# Patient Record
Sex: FEMALE | Race: WHITE | Employment: OTHER | RURAL
[De-identification: names, ages, dates, MRNs, and addresses within clinical notes are randomized per-mention and may not be internally consistent; named-entity substitution may affect disease eponyms.]

---

## 2017-04-04 ENCOUNTER — OFFICE VISIT (OUTPATIENT)
Dept: FAMILY MEDICINE CLINIC | Age: 82
End: 2017-04-04

## 2017-04-04 DIAGNOSIS — R26.89 BALANCE PROBLEM: ICD-10-CM

## 2017-04-04 DIAGNOSIS — D64.89 ANEMIA DUE TO OTHER CAUSE, NOT CLASSIFIED: ICD-10-CM

## 2017-04-04 DIAGNOSIS — I10 ESSENTIAL HYPERTENSION WITH GOAL BLOOD PRESSURE LESS THAN 140/90: ICD-10-CM

## 2017-04-04 DIAGNOSIS — Z00.00 MEDICARE ANNUAL WELLNESS VISIT, SUBSEQUENT: Primary | ICD-10-CM

## 2017-04-04 PROBLEM — D64.9 ABSOLUTE ANEMIA: Status: ACTIVE | Noted: 2017-04-04

## 2017-04-04 RX ORDER — LISINOPRIL 10 MG/1
10 TABLET ORAL DAILY
Qty: 30 TAB | Refills: 0 | Status: SHIPPED | OUTPATIENT
Start: 2017-04-04 | End: 2017-04-07 | Stop reason: SDUPTHER

## 2017-04-04 RX ORDER — LISINOPRIL 10 MG/1
20 TABLET ORAL DAILY
Qty: 180 TAB | Refills: 3 | Status: SHIPPED | OUTPATIENT
Start: 2017-04-04 | End: 2018-01-16 | Stop reason: SDUPTHER

## 2017-04-04 NOTE — PROGRESS NOTES
Identified pt with two pt identifiers(name and ).     Chief Complaint   Patient presents with    Annual Wellness Visit    Labs     pt's last labs were done 2016 - over the past 3 months she has not been taking the multigen as she would like to see if it is still necessary when she goes for a check up    Other     pt needs referral to hematolgist - [address] [place] [address]    Medication Refill     would like paper prescription in addition to sending one refill to the local pharmacy as she would like to start mail order    Other     reports that she \"chickened out\" of getting eye lid lift and did not see derm or PT a was recommended - would like referral to go to PT for balance therapy       Health Maintenance Due   Topic    DTaP/Tdap/Td series (1 - Tdap)    GLAUCOMA SCREENING Q2Y     OSTEOPOROSIS SCREENING (DEXA)     MEDICARE YEARLY EXAM     Pneumococcal 65+ Low/Medium Risk (2 of 2 - PCV13)    INFLUENZA AGE 9 TO ADULT        Wt Readings from Last 3 Encounters:   17 156 lb (70.8 kg)   16 158 lb 3.2 oz (71.8 kg)   16 155 lb 9.6 oz (70.6 kg)     Temp Readings from Last 3 Encounters:   16 97.1 °F (36.2 °C) (Oral)   16 97.9 °F (36.6 °C) (Oral)   16 97.8 °F (36.6 °C) (Oral)     BP Readings from Last 3 Encounters:   16 138/60   16 179/83   16 140/62     Pulse Readings from Last 3 Encounters:   16 73   16 100   16 84         Learning Assessment:  :     Learning Assessment 2016   PRIMARY LEARNER Patient Patient   HIGHEST LEVEL OF EDUCATION - PRIMARY LEARNER  - GRADUATED HIGH SCHOOL OR GED   BARRIERS PRIMARY LEARNER - NONE   CO-LEARNER CAREGIVER - No   PRIMARY LANGUAGE ENGLISH ENGLISH   LEARNER PREFERENCE PRIMARY DEMONSTRATION DEMONSTRATION     - LISTENING     - READING   ANSWERED BY patient  patient   RELATIONSHIP SELF SELF       Depression Screening:  :     PHQ 2 / 9, over the last two weeks 2017   Little interest or pleasure in doing things Not at all   Feeling down, depressed or hopeless Not at all   Total Score PHQ 2 0       Fall Risk Assessment:  :     Fall Risk Assessment, last 12 mths 4/4/2017   Able to walk? Yes   Fall in past 12 months? No       Abuse Screening:  :     Abuse Screening Questionnaire 4/4/2017   Do you ever feel afraid of your partner? N   Are you in a relationship with someone who physically or mentally threatens you? N   Is it safe for you to go home? Y       Coordination of Care Questionnaire:  :     1) Have you been to an emergency room, urgent care clinic since your last visit? no   Hospitalized since your last visit? no             2) Have you seen or consulted any other health care providers outside of [address] [place] [address] since your last visit? no  (Include any pap smears or colon screenings in this section.)    3) Do you have an Advance Directive on file? no  Are you interested in receiving information about Advance Directives? no and pt declines receiving paperwork today    Patient is accompanied by self I have received verbal consent from Ary Corrales to discuss any/all medical information while they are present in the room. Reviewed record in preparation for visit and have obtained necessary documentation. Medication reconciliation up to date and corrected with patient at this time. Functional Ability:   Does the patient exhibit a steady gait? yes   How long did it take the patient to get up and walk from a sitting position? A few seconds   Is the patient self reliant?  (ie can do own laundry, meals, household chores)  yes     Does the patient handle his/her own medications? yes     Does the patient handle his/her own money? yes     Is the patients home safe (ie good lighting, handrails on stairs and bath, etc.)? No, she does not have handrails in shower     Did you notice or did patient express any hearing difficulties?    no     Did you notice or did patient express any vision difficulties? Yes, and she wears glasses to correct this     Were distance and reading eye charts used?  no       Patient was offered the opportunity to discuss advance care planning:  yes     Does patient have an Advance Directive:  no   If no, did you provide information on Caring Connections?   No, pt declines receiving paperwork

## 2017-04-04 NOTE — PROGRESS NOTES
Alison Prasad is a 80 y.o. female and presents for annual Medicare Wellness Visit. Problem List: Reviewed with patient and discussed risk factors. Patient Active Problem List   Diagnosis Code    Essential hypertension with goal blood pressure less than 140/90 I10    Absolute anemia D64.9       Current medical providers:  Patient Care Team:  Skylar Farley MD as PCP - General (Internal Medicine)    PSH: Reviewed with patient  Past Surgical History:   Procedure Laterality Date    HX CATARACT REMOVAL  1997     [facility]    [address]    osteochroditis    [address]    blood patch for intercranial spontaneious hypotension         SH: Reviewed with patient  Social History   Substance Use Topics    Smoking status: Former Smoker    Smokeless tobacco: Never Used    Alcohol use No       FH: Reviewed with patient  Family History   Problem Relation Age of Onset    Lung Disease Brother      lung cancer    Diabetes Mother     Thyroid Disease Mother      goiter    Diabetes Sister     Heart Disease Sister     Heart Disease Father     Stroke Father     Hypertension Brother     Stroke Brother     Parkinson's Disease Sister     Other Sister      brain aneurysm    Alzheimer Sister        Medications/Allergies: Reviewed with patient  No current outpatient prescriptions on file prior to visit. No current facility-administered medications on file prior to visit. Allergies   Allergen Reactions    Hyzaar [Losartan-Hydrochlorothiazide] Vertigo    Pseudoephedrine Other (comments)     Rapid heart rate.         Objective:  /90 (BP 1 Location: Right arm, BP Patient Position: Sitting)  Pulse 81  Temp 97.1 °F (36.2 °C) (Temporal)   Resp 16  Ht 5' 6\" (1.676 m)  Wt 156 lb (70.8 kg)  SpO2 98%  BMI 25.18 kg/m2  Assessment of cognitive impairment: Alert and oriented x 3    General Appearance:  Well developed, well nourished,alert and oriented x 3, and individual in no acute distress. Ears/Nose/Mouth/Throat:   Hearing grossly normal.         Neck: Supple. Chest:   Lungs clear to auscultation bilaterally. Cardiovascular:  Regular rate and rhythm, S1, S2 normal, no murmur. Abdomen:   Soft, non-tender, bowel sounds are active. Extremities: No edema bilaterally. Skin: Warm and dry. Depression Screen:   PHQ 2 / 9, over the last two weeks 4/4/2017   Little interest or pleasure in doing things Not at all   Feeling down, depressed or hopeless Not at all   Total Score PHQ 2 0       Fall Risk Assessment:    Fall Risk Assessment, last 12 mths 4/4/2017   Able to walk? Yes   Fall in past 12 months? No       Functional Ability:   Does the patient exhibit a steady gait? yes   How long did it take the patient to get up and walk from a sitting position? A few seconds   Is the patient self reliant? (ie can do own laundry, meals, household chores)  yes   Does the patient handle his/her own medications? yes   Does the patient handle his/her own money? yes   Is the patients home safe (ie good lighting, handrails on stairs and bath, etc.)? No, she does not have handrails in shower   Did you notice or did patient express any hearing difficulties? no   Did you notice or did patient express any vision difficulties? Yes, and she wears glasses to correct this   Were distance and reading eye charts used? no      Patient was offered the opportunity to discuss advance care planning:  yes    Does patient have an Advance Directive:  no   If no, did you provide information on Caring Connections?   No, pt declines receiving paperwork            Plan:      Orders Placed This Encounter    REFERRAL TO ONCOLOGY    DISCONTD: Iron [id number] (MULTIGEN FOLIC) tab capsule    lisinopril (PRINIVIL, ZESTRIL) 10 mg tablet    Iron [id number] (MULTIGEN FOLIC) tab capsule    diph,pertuss,acel,,tetanus vac,PF, (ADACEL) 2 Lf-(2.5-5-3-5 mcg)-5Lf/0.5 mL syrg vaccine    pneumococcal 13 gladis conj dip (PREVNAR-13) 0.5 mL syrg injection    lisinopril (PRINIVIL, ZESTRIL) 10 mg tablet       Health Maintenance   Topic Date Due    DTaP/Tdap/Td series (1 - Tdap) Addressed    GLAUCOMA SCREENING Q2Y  Addressed    OSTEOPOROSIS SCREENING (DEXA)  Addressed    MEDICARE YEARLY EXAM  Addressed    Pneumococcal 65+ Low/Medium Risk (2 of 2 - PCV13) Addressed    ZOSTER VACCINE AGE 60>  Completed    INFLUENZA AGE 9 TO ADULT  Addressed         ASSESSMENT and PLAN:    ICD-10-CM ICD-9-CM    1. Medicare annual wellness visit, subsequent Z00.00 V70.0 Anticipatory guidance discussed. Immunizations reviewed  HM updated. 2. Essential hypertension with goal blood pressure less than 140/90 I10 401.9 lisinopril (PRINIVIL, ZESTRIL) 10 mg tablet   3. Anemia due to other cause, not classified D64.89 285.8 REFERRAL TO ONCOLOGY   4. Balance problem R26.89 781.99 REFERRAL TO PHYSICAL THERAPY     I have discussed the diagnosis with the patient and the intended plan as seen in the above orders. The patient has received an after-visit summary and questions were answered concerning future plans. Medication Side Effects and Warnings were discussed with patient: yes   Patient Labs were reviewed and or requested: yes   Patient Past Records were reviewed and or requested: N/A    *Patient verbalized understanding and agreement with the plan. A copy of the After Visit Summary with personalized health plan was given to the patient today. Follow-up Disposition:  Return in about 1 year (around 4/4/2018), or if symptoms worsen or fail to improve.

## 2017-04-04 NOTE — MR AVS SNAPSHOT
Visit Information Date & Time Provider Department Dept. Phone Encounter #  
 4/4/2017  2:30 PM Diana OneilTyler [phone] Upcoming Health Maintenance Date Due DTaP/Tdap/Td series (1 - Tdap) 3/30/1952 GLAUCOMA SCREENING Q2Y 3/30/1996 OSTEOPOROSIS SCREENING (DEXA) 3/30/1996 MEDICARE YEARLY EXAM 3/30/1996 Pneumococcal 65+ Low/Medium Risk (2 of 2 - PCV13) 9/1/2015 Allergies as of 4/4/2017  Review Complete On: 4/4/2017 By: Jessica Leon LPN Severity Noted Reaction Type Reactions Hyzaar [Losartan-hydrochlorothiazide] High 05/06/2016    Vertigo Pseudoephedrine High 03/14/2012   Systemic Other (comments) Rapid heart rate. Current Immunizations  Reviewed on 4/4/2017 Name Date Influenza High Dose Vaccine PF 9/15/2015 Pneumococcal Polysaccharide (PPSV-23) 9/1/2014 Reviewed by Diana Oneil MD on 4/4/2017 at  3:25 PM  
You Were Diagnosed With   
  
 Codes Comments Medicare annual wellness visit, subsequent    -  Primary ICD-10-CM: Z00.00 ICD-9-CM: V70.0 Essential hypertension with goal blood pressure less than 140/90     ICD-10-CM: I10 
ICD-9-CM: 401.9 Anemia due to other cause, not classified     ICD-10-CM: D64.89 ICD-9-CM: 285.8 Balance problem     ICD-10-CM: R26.89 
ICD-9-CM: 781.99 Vitals BP Pulse Temp Resp Height(growth percentile) 189/77 (BP 1 Location: Right arm, BP Patient Position: Sitting) 81 97.1 °F (36.2 °C) (Temporal) 16 5' 6\" (1.676 m) Weight(growth percentile) SpO2 BMI OB Status Smoking Status 156 lb (70.8 kg) 98% 25.18 kg/m2 Postmenopausal Former Smoker Vitals History BMI and BSA Data Body Mass Index Body Surface Area  
 25.18 kg/m 2 1.82 m 2 Preferred Pharmacy Pharmacy Name Phone [facility] [address] [place] [address] [phone] Your Updated Medication List  
  
   
 This list is accurate as of: 17  3:37 PM.  Always use your most recent med list.  
  
  
  
  
 diph,pertuss(acel),tetanus vac(PF) 2 Lf-(2.5-5-3-5 mcg)-5Lf/0.5 mL Syrg vaccine Commonly known as:  ADACEL  
0.5 mL by IntraMUSCular route once for 1 dose. Iron AspGly-VitC-B12-FA-[id number]-Suc Tab capsule Commonly known as:  Kristin Leyva Take 1 Tab by mouth daily for 90 days. * lisinopril 10 mg tablet Commonly known as:  Sada Gravel Take 2 Tabs by mouth daily for 180 days. * lisinopril 10 mg tablet Commonly known as:  Sada Gravel Take 1 Tab by mouth daily for 30 days. pneumococcal 13 gladis conj dip 0.5 mL Syrg injection Commonly known as:  PREVNAR-13  
0.5 mL by IntraMUSCular route once for 1 dose. * Notice: This list has 2 medication(s) that are the same as other medications prescribed for you. Read the directions carefully, and ask your doctor or other care provider to review them with you. Prescriptions Printed Refills  
 lisinopril (PRINIVIL, ZESTRIL) 10 mg tablet 3 Sig: Take 2 Tabs by mouth daily for 180 days. Class: Print Route: Oral  
 Iron [id number] (MULTIGEN FOLIC) tab capsule 3 Sig: Take 1 Tab by mouth daily for 90 days. Class: Print Route: Oral  
 diph,pertuss,acel,,tetanus vac,PF, (ADACEL) 2 Lf-(2.5-5-3-5 mcg)-5Lf/0.5 mL syrg vaccine 0 Si.5 mL by IntraMUSCular route once for 1 dose. Class: Print Route: IntraMUSCular  
 pneumococcal 13 gladis conj dip (PREVNAR-13) 0.5 mL syrg injection 0 Si.5 mL by IntraMUSCular route once for 1 dose. Class: Print Route: IntraMUSCular Prescriptions Sent to Pharmacy Refills  
 lisinopril (PRINIVIL, ZESTRIL) 10 mg tablet 0 Sig: Take 1 Tab by mouth daily for 30 days. Class: Normal  
 Pharmacy: [id number] [place] [address] #: [phone] Route: Oral  
  
We Performed the Following REFERRAL TO ONCOLOGY [QCM20 Custom] Comments:  
 Patient with persistent anemia. Has been on Multigen-Folic. She had been seen previously by Dr. Rafa Hamilton. Needs new referral for a different provider. REFERRAL TO PHYSICAL THERAPY [HEO39 Custom] Comments:  
 Please evaluate patient for balance problems and for PT to help with reconditioning. Please schedule and authorize patient for services as per protocol. Referral Information Referral ID Referred By Referred To  
  
 [id number] Rachel Christensen E Not Available Visits Status Start Date End Date 1 New Request 4/4/17 4/4/18 If your referral has a status of pending review or denied, additional information will be sent to support the outcome of this decision. Referral ID Referred By Referred To  
 [id number] Rachel Christensen E Not Available Visits Status Start Date End Date 1 New Request 4/4/17 4/4/18 If your referral has a status of pending review or denied, additional information will be sent to support the outcome of this decision. Patient Instructions Well Visit, Over 72: Care Instructions Your Care Instructions Physical exams can help you stay healthy. Your doctor has checked your overall health and may have suggested ways to take good care of yourself. He or she also may have recommended tests. At home, you can help prevent illness with healthy eating, regular exercise, and other steps. Follow-up care is a key part of your treatment and safety. Be sure to make and go to all appointments, and call your doctor if you are having problems. It's also a good idea to know your test results and keep a list of the medicines you take. How can you care for yourself at home? · Reach and stay at a healthy weight. This will lower your risk for many problems, such as obesity, diabetes, heart disease, and high blood pressure. · Get at least 30 minutes of exercise on most days of the week.  Walking is a good choice. You also may want to do other activities, such as running, swimming, cycling, or playing tennis or team sports. · Do not smoke. Smoking can make health problems worse. If you need help quitting, talk to your doctor about stop-smoking programs and medicines. These can increase your chances of quitting for good. · Protect your skin from too much sun. When you're outdoors from 10 a.m. to 4 p.m., stay in the shade or cover up with clothing and a hat with a wide brim. Wear sunglasses that block UV rays. Even when it's cloudy, put broad-spectrum sunscreen (SPF 30 or higher) on any exposed skin. · See a dentist one or two times a year for checkups and to have your teeth cleaned. · Wear a seat belt in the car. · Limit alcohol to 2 drinks a day for men and 1 drink a day for women. Too much alcohol can cause health problems. Follow your doctor's advice about when to have certain tests. These tests can spot problems early. For men and women · Cholesterol. Your doctor will tell you how often to have this done based on your overall health and other things that can increase your risk for heart attack and stroke. · Blood pressure. Have your blood pressure checked during a routine doctor visit. Your doctor will tell you how often to check your blood pressure based on your age, your blood pressure results, and other factors. · Diabetes. Ask your doctor whether you should have tests for diabetes. · Vision. Experts recommend that you have yearly exams for glaucoma and other age-related eye problems. · Hearing. Tell your doctor if you notice any change in your hearing. You can have tests to find out how well you hear. · Colon cancer tests. Keep having colon cancer tests as your doctor recommends. You can have one of several types of tests. · Heart attack and stroke risk. At least every 4 to 6 years, you should have your risk for heart attack and stroke assessed.  Your doctor uses factors such as your age, blood pressure, cholesterol, and whether you smoke or have diabetes to show what your risk for a heart attack or stroke is over the next 10 years. · Osteoporosis. Talk to your doctor about whether you should have a bone density test to find out whether you have thinning bones. Also ask your doctor about whether you should take calcium and vitamin D supplements. For women · Pap test and pelvic exam. You may no longer need a Pap test. Talk with your doctor about whether to stop or continue to have Pap tests. · Breast exam and mammogram. Ask how often you should have a mammogram, which is an X-ray of your breasts. A mammogram can spot breast cancer before it can be felt and when it is easiest to treat. · Thyroid disease. Talk to your doctor about whether to have your thyroid checked as part of a regular physical exam. Women have an increased chance of a thyroid problem. For men · Prostate exam. Talk to your doctor about whether you should have a blood test (called a PSA test) for prostate cancer. Experts disagree on whether men should have this test. Some experts recommend that you discuss the benefits and risks of the test with your doctor. · Abdominal aortic aneurysm. Ask your doctor whether you should have a test to check for an aneurysm. You may need a test if you ever smoked or if your parent, brother, sister, or child has had an aneurysm. When should you call for help? Watch closely for changes in your health, and be sure to contact your doctor if you have any problems or symptoms that concern you. Where can you learn more? Go to http://katrin-connie.info/. Enter I476 in the search box to learn more about \"Well Visit, Over 65: Care Instructions. \" Current as of: July 19, 2016 Content Version: 11.2 © 2003-6400 [employer].  Care instructions adapted under license by [employer] (which disclaims liability or warranty for this information). If you have questions about a medical condition or this instruction, always ask your healthcare professional. [address] any warranty or liability for your use of this information. Introducing [facility] SERVICES! Mirian Moore introduces [employer] patient portal. Now you can access parts of your medical record, email your doctor's office, and request medication refills online. 1. In your internet browser, go to [email or web address] 2. Click on the First Time User? Click Here link in the Sign In box. You will see the New Member Sign Up page. 3. Enter your [employer] Access Code exactly as it appears below. You will not need to use this code after youve completed the sign-up process. If you do not sign up before the expiration date, you must request a new code. · [employer] Access Code: [email or web address] Expires: 7/3/2017  3:32 PM 
 
4. Enter the last four digits of your Social Security Number (xxxx) and Date of Birth (mm/dd/yyyy) as indicated and click Submit. You will be taken to the next sign-up page. 5. Create a ScreachTV ID. This will be your [employer] login ID and cannot be changed, so think of one that is secure and easy to remember. 6. Create a [employer] password. You can change your password at any time. 7. Enter your Password Reset Question and Answer. This can be used at a later time if you forget your password. 8. Enter your e-mail address. You will receive e-mail notification when new information is available in [address] 9. Click Sign Up. You can now view and download portions of your medical record. 10. Click the Download Summary menu link to download a portable copy of your medical information. If you have questions, please visit the Frequently Asked Questions section of the [employer] website. Remember, [employer] is NOT to be used for urgent needs. For medical emergencies, dial 911. Now available from your iPhone and Android! Please provide this summary of care documentation to your next provider. Your primary care clinician is listed as [address]. If you have any questions after today's visit, please call [phone].

## 2017-04-04 NOTE — PATIENT INSTRUCTIONS

## 2017-04-07 DIAGNOSIS — I10 ESSENTIAL HYPERTENSION WITH GOAL BLOOD PRESSURE LESS THAN 140/90: ICD-10-CM

## 2017-04-07 NOTE — TELEPHONE ENCOUNTER
Patient needs a reprint of script for lisonpril that is taken 1 time a day. Please mail it to the patient. The one that states take 2 times a day is not right.

## 2017-04-07 NOTE — TELEPHONE ENCOUNTER
Pt indicated she is currently on the 10mg once daily. She needs this in hardcopy please for mail order.

## 2017-04-11 RX ORDER — LISINOPRIL 10 MG/1
10 TABLET ORAL DAILY
Qty: 90 TAB | Refills: 2 | Status: SHIPPED | OUTPATIENT
Start: 2017-04-11 | End: 2018-01-16 | Stop reason: SDUPTHER

## 2017-04-16 VITALS
BODY MASS INDEX: 25.07 KG/M2 | HEIGHT: 66 IN | TEMPERATURE: 97.1 F | WEIGHT: 156 LBS | HEART RATE: 81 BPM | RESPIRATION RATE: 16 BRPM | SYSTOLIC BLOOD PRESSURE: 147 MMHG | DIASTOLIC BLOOD PRESSURE: 90 MMHG | OXYGEN SATURATION: 98 %

## 2017-05-10 ENCOUNTER — TELEPHONE (OUTPATIENT)
Dept: FAMILY MEDICINE CLINIC | Age: 82
End: 2017-05-10

## 2017-05-10 NOTE — TELEPHONE ENCOUNTER
[facility] called to asked Dr. Myrick Speak to give Dr. Soledad Coates a call regarding the pt. He will be taking over the care due to the previous MD she had retiring.  can be reached at [phone]    **They did not specify if this was urgent. **

## 2017-09-25 RX ORDER — IRON ASPGLY/C/B12/FA/CA-TH/SUC 70-150-1MG
TABLET ORAL
Qty: 90 TAB | Refills: 1 | Status: SHIPPED | OUTPATIENT
Start: 2017-09-25 | End: 2018-07-10 | Stop reason: SDUPTHER

## 2018-01-16 ENCOUNTER — OFFICE VISIT (OUTPATIENT)
Dept: FAMILY MEDICINE CLINIC | Age: 83
End: 2018-01-16

## 2018-01-16 ENCOUNTER — HOSPITAL ENCOUNTER (OUTPATIENT)
Dept: LAB | Age: 83
Discharge: HOME | End: 2018-01-16
Payer: MEDICARE

## 2018-01-16 VITALS
WEIGHT: 161 LBS | TEMPERATURE: 98.1 F | HEART RATE: 103 BPM | BODY MASS INDEX: 25.88 KG/M2 | OXYGEN SATURATION: 96 % | DIASTOLIC BLOOD PRESSURE: 84 MMHG | RESPIRATION RATE: 20 BRPM | HEIGHT: 66 IN | SYSTOLIC BLOOD PRESSURE: 162 MMHG

## 2018-01-16 DIAGNOSIS — E55.9 VITAMIN D DEFICIENCY: ICD-10-CM

## 2018-01-16 DIAGNOSIS — R32 URINARY INCONTINENCE, UNSPECIFIED TYPE: ICD-10-CM

## 2018-01-16 DIAGNOSIS — R53.83 MALAISE AND FATIGUE: ICD-10-CM

## 2018-01-16 DIAGNOSIS — R26.9 ABNORMALITY OF GAIT AND MOBILITY: ICD-10-CM

## 2018-01-16 DIAGNOSIS — R26.9 GAIT DISTURBANCE: Primary | ICD-10-CM

## 2018-01-16 DIAGNOSIS — H53.9 VISUAL DISTURBANCE: ICD-10-CM

## 2018-01-16 DIAGNOSIS — R53.81 MALAISE AND FATIGUE: ICD-10-CM

## 2018-01-16 DIAGNOSIS — G44.59 OTHER COMPLICATED HEADACHE SYNDROME: ICD-10-CM

## 2018-01-16 DIAGNOSIS — R26.89 BALANCE PROBLEMS: ICD-10-CM

## 2018-01-16 LAB
BILIRUB UR QL STRIP: NEGATIVE
GLUCOSE UR-MCNC: NEGATIVE MG/DL
KETONES P FAST UR STRIP-MCNC: NEGATIVE MG/DL
PH UR STRIP: 5.5 [PH] (ref 4.6–8)
PROT UR QL STRIP: NEGATIVE
SP GR UR STRIP: 1 (ref 1–1.03)
UA UROBILINOGEN AMB POC: NORMAL (ref 0.2–1)
URINALYSIS CLARITY POC: CLEAR
URINALYSIS COLOR POC: YELLOW
URINE BLOOD POC: NEGATIVE
URINE LEUKOCYTES POC: NEGATIVE
URINE NITRITES POC: NEGATIVE

## 2018-01-16 PROCEDURE — 80053 COMPREHEN METABOLIC PANEL: CPT

## 2018-01-16 PROCEDURE — 82306 VITAMIN D 25 HYDROXY: CPT

## 2018-01-16 PROCEDURE — 36415 COLL VENOUS BLD VENIPUNCTURE: CPT

## 2018-01-16 PROCEDURE — 84443 ASSAY THYROID STIM HORMONE: CPT

## 2018-01-16 PROCEDURE — 82607 VITAMIN B-12: CPT

## 2018-01-16 PROCEDURE — 85025 COMPLETE CBC W/AUTO DIFF WBC: CPT

## 2018-01-16 NOTE — PROGRESS NOTES
Identified pt with two pt identifiers(name and ). Chief Complaint   Patient presents with    Annual Wellness Visit    Head Pain     Headaches are getting worse    Neurologic Problem     abnormal gait, incontinence, leaning forward, vision, memory concentration decreasing; loss of words        Health Maintenance Due   Topic    DTaP/Tdap/Td series (1 - Tdap)    GLAUCOMA SCREENING Q2Y     OSTEOPOROSIS SCREENING (DEXA)     Pneumococcal 65+ Low/Medium Risk (2 of 2 - PCV13)       Wt Readings from Last 3 Encounters:   18 161 lb (73 kg)   17 156 lb (70.8 kg)   16 158 lb 3.2 oz (71.8 kg)     Temp Readings from Last 3 Encounters:   18 98.1 °F (36.7 °C) (Temporal)   17 97.1 °F (36.2 °C) (Temporal)   16 97.1 °F (36.2 °C) (Oral)     BP Readings from Last 3 Encounters:   18 144/62   17 147/90   16 138/60     Pulse Readings from Last 3 Encounters:   18 (!) 103   17 81   16 73         Learning Assessment:  :     Learning Assessment 2016   PRIMARY LEARNER Patient Patient   HIGHEST LEVEL OF EDUCATION - PRIMARY LEARNER  - GRADUATED HIGH SCHOOL OR GED   BARRIERS PRIMARY LEARNER - NONE   CO-LEARNER CAREGIVER - No   PRIMARY LANGUAGE ENGLISH ENGLISH   LEARNER PREFERENCE PRIMARY DEMONSTRATION DEMONSTRATION     - LISTENING     - READING   ANSWERED BY patient  patient   RELATIONSHIP SELF SELF       Depression Screening:  :     PHQ over the last two weeks 2018   Little interest or pleasure in doing things Not at all   Feeling down, depressed or hopeless Not at all   Total Score PHQ 2 0       Fall Risk Assessment:  :     Fall Risk Assessment, last 12 mths 2018   Able to walk? Yes   Fall in past 12 months? Yes   Number of falls in past 12 months 2       Abuse Screening:  :     Abuse Screening Questionnaire 2017   Do you ever feel afraid of your partner?  N   Are you in a relationship with someone who physically or mentally threatens you? N   Is it safe for you to go home? Y       Coordination of Care Questionnaire:  :     1) Have you been to an emergency room, urgent care clinic since your last visit? no   Hospitalized since your last visit? no             2) Have you seen or consulted any other health care providers outside of [address] [place] [address] since your last visit? no  (Include any pap smears or colon screenings in this section.)    3) Do you have an Advance Directive on file? no  Are you interested in receiving information about Advance Directives? no    Patient is accompanied by daughter I have received verbal consent from Azul Chowdhury to discuss any/all medical information while they are present in the room. Reviewed record in preparation for visit and have obtained necessary documentation. Medication reconciliation up to date and corrected with patient at this time.

## 2018-01-16 NOTE — MR AVS SNAPSHOT
[address] [place] [address] [address] [facility] 
[phone] Patient: Ottoniel Cadena MRN: [id number] ZMO:[id number] Visit Information Date & Time Provider Department Dept. Phone Encounter #  
 1/16/2018  2:45 PM Tyler Duran [id number] Upcoming Health Maintenance Date Due DTaP/Tdap/Td series (1 - Tdap) 3/30/1952 GLAUCOMA SCREENING Q2Y 3/30/1996 OSTEOPOROSIS SCREENING (DEXA) 3/30/1996 Pneumococcal 65+ Low/Medium Risk (2 of 2 - PCV13) 9/1/2015 MEDICARE YEARLY EXAM 4/5/2018 Allergies as of 1/16/2018  Review Complete On: 1/16/2018 By: Javier Ruvalcaba MD  
  
 Severity Noted Reaction Type Reactions Hyzaar [Losartan-hydrochlorothiazide] High 05/06/2016    Vertigo Pseudoephedrine High 03/14/2012   Systemic Other (comments) Rapid heart rate. Current Immunizations  Reviewed on 4/4/2017 Name Date Influenza High Dose Vaccine PF 11/14/2017, 9/15/2015 Pneumococcal Polysaccharide (PPSV-23) 9/1/2014 Not reviewed this visit You Were Diagnosed With   
  
 Codes Comments Gait disturbance    -  Primary ICD-10-CM: R26.9 ICD-9-CM: 997. 2 Balance problems     ICD-10-CM: R26.89 
ICD-9-CM: 781.99 Visual disturbance     ICD-10-CM: H53.9 ICD-9-CM: 368.9 Urinary incontinence, unspecified type     ICD-10-CM: R32 
ICD-9-CM: 788.30 Other complicated headache syndrome     ICD-10-CM: G44.59 
ICD-9-CM: 339.44 Vitamin D deficiency     ICD-10-CM: E55.9 ICD-9-CM: 268.9 Malaise and fatigue     ICD-10-CM: R53.81, R53.83 ICD-9-CM: 780.79 Abnormality of gait and mobility     ICD-10-CM: R26.9 ICD-9-CM: 066. 2 Vitals BP Pulse Temp Resp Height(growth percentile) Weight(growth percentile) 144/62 (!) 103 98.1 °F (36.7 °C) (Temporal) 20 5' 6\" (1.676 m) 161 lb (73 kg) SpO2 BMI OB Status Smoking Status 96% 25.99 kg/m2 Postmenopausal Former Smoker BMI and BSA Data Body Mass Index Body Surface Area  
 25.99 kg/m 2 1.84 m 2 Preferred Pharmacy Pharmacy Name Phone Jonnie Riz[facility] [phone] Your Updated Medication List  
  
   
This list is accurate as of: 1/16/18  4:19 PM.  Always use your most recent med list.  
  
  
  
  
 lisinopril 10 mg tablet Commonly known as:  PRINIVIL, ZESTRIL  
TAKE ONE TABLET BY MOUTH ONCE DAILY MULTIGEN FOLIC Tab capsule Generic drug:  Iron AspGly-VitC-B12-FA-Ca-Suc  
TAKE ONE TABLET BY MOUTH ONCE DAILY FOR  90  DAYS We Performed the Following AMB POC URINALYSIS DIP STICK AUTO W/O MICRO [96939 CPT(R)] CBC WITH AUTOMATED DIFF [27636 CPT(R)] METABOLIC PANEL, COMPREHENSIVE [84505 CPT(R)] REFERRAL TO NEUROLOGY [DFU39 Custom] Comments:  
 86F who has been seen by Dr. Solange Morales in the past. She has had worsening daily headaches, balance issues, lower blood pressures (for her), gait disturbance, incontinence. The concern is Normal Pressure Hydrocephalus vs other pathology. Her symptoms seem to have progressed over the past several months and she is becoming more and more concerned about falling (and has had some falls). THYROID CASCADE PROFILE [[id number] Custom] VITAMIN B12 & FOLATE [23797 CPT(R)] VITAMIN D, 25 HYDROXY [id number] CPT(R)] Referral Information Referral ID Referred By Referred To  
  
 [id number] Chantell Devries MD   
   [address] Phone: [phone] Fax: [phone] Visits Status Start Date End Date 1 New Request 1/16/18 1/16/19 If your referral has a status of pending review or denied, additional information will be sent to support the outcome of this decision. Patient Instructions Headache: Care Instructions Your Care Instructions Headaches have many possible causes. Most headaches aren't a sign of a more serious problem, and they will get better on their own. Home treatment may help you feel better faster. The doctor has checked you carefully, but problems can develop later. If you notice any problems or new symptoms, get medical treatment right away. Follow-up care is a key part of your treatment and safety. Be sure to make and go to all appointments, and call your doctor if you are having problems. It's also a good idea to know your test results and keep a list of the medicines you take. How can you care for yourself at home? · Do not drive if you have taken a prescription pain medicine. · Rest in a quiet, dark room until your headache is gone. Close your eyes and try to relax or go to sleep. Don't watch TV or read. · Put a cold, moist cloth or cold pack on the painful area for 10 to 20 minutes at a time. Put a thin cloth between the cold pack and your skin. · Use a warm, moist towel or a heating pad set on low to relax tight shoulder and neck muscles. · Have someone gently massage your neck and shoulders. · Take pain medicines exactly as directed. ¨ If the doctor gave you a prescription medicine for pain, take it as prescribed. ¨ If you are not taking a prescription pain medicine, ask your doctor if you can take an over-the-counter medicine. · Be careful not to take pain medicine more often than the instructions allow, because you may get worse or more frequent headaches when the medicine wears off. · Do not ignore new symptoms that occur with a headache, such as a fever, weakness or numbness, vision changes, or confusion. These may be signs of a more serious problem. To prevent headaches · Keep a headache diary so you can figure out what triggers your headaches. Avoiding triggers may help you prevent headaches.  Record when each headache began, how long it lasted, and what the pain was like (throbbing, aching, stabbing, or dull). Write down any other symptoms you had with the headache, such as nausea, flashing lights or dark spots, or sensitivity to bright light or loud noise. Note if the headache occurred near your period. List anything that might have triggered the headache, such as certain foods (chocolate, cheese, wine) or odors, smoke, bright light, stress, or lack of sleep. · Find healthy ways to deal with stress. Headaches are most common during or right after stressful times. Take time to relax before and after you do something that has caused a headache in the past. 
· Try to keep your muscles relaxed by keeping good posture. Check your jaw, face, neck, and shoulder muscles for tension, and try relaxing them. When sitting at a desk, change positions often, and stretch for 30 seconds each hour. · Get plenty of sleep and exercise. · Eat regularly and well. Long periods without food can trigger a headache. · Treat yourself to a massage. Some people find that regular massages are very helpful in relieving tension. · Limit caffeine by not drinking too much coffee, tea, or soda. But don't quit caffeine suddenly, because that can also give you headaches. · Reduce eyestrain from computers by blinking frequently and looking away from the computer screen every so often. Make sure you have proper eyewear and that your monitor is set up properly, about an arm's length away. · Seek help if you have depression or anxiety. Your headaches may be linked to these conditions. Treatment can both prevent headaches and help with symptoms of anxiety or depression. When should you call for help? Call 911 anytime you think you may need emergency care. For example, call if: 
? · You have signs of a stroke. These may include: 
¨ Sudden numbness, paralysis, or weakness in your face, arm, or leg, especially on only one side of your body. ¨ Sudden vision changes. ¨ Sudden trouble speaking. ¨ Sudden confusion or trouble understanding simple statements. ¨ Sudden problems with walking or balance. ¨ A sudden, severe headache that is different from past headaches. ?Call your doctor now or seek immediate medical care if: 
? · You have a new or worse headache. ? · Your headache gets much worse. Where can you learn more? Go to http://katrin-connie.info/. Enter M271 in the search box to learn more about \"Headache: Care Instructions. \" Current as of: October 14, 2016 Content Version: 11.4 © 7892-4509 [employer]. Care instructions adapted under license by [employer] (which disclaims liability or warranty for this information). If you have questions about a medical condition or this instruction, always ask your healthcare professional. [address] any warranty or liability for your use of this information. Introducing [facility]! Elio Tacho introduces manetch patient portal. Now you can access parts of your medical record, email your doctor's office, and request medication refills online. 1. In your internet browser, go to [email or web address] by [employer] 2. Click on the First Time User? Click Here link in the Sign In box. You will see the New Member Sign Up page. 3. Enter your manetch Access Code exactly as it appears below. You will not need to use this code after youve completed the sign-up process. If you do not sign up before the expiration date, you must request a new code. · manetch Access Code: [facility] Expires: 4/16/2018  4:19 PM 
 
4. Enter the last four digits of your Social Security Number (xxxx) and Date of Birth (mm/dd/yyyy) as indicated and click Submit. You will be taken to the next sign-up page. 5. Create a manetch ID. This will be your manetch login ID and cannot be changed, so think of one that is secure and easy to remember. 6. Create a Medigo password. You can change your password at any time. 7. Enter your Password Reset Question and Answer. This can be used at a later time if you forget your password. 8. Enter your e-mail address. You will receive e-mail notification when new information is available in [address] 9. Click Sign Up. You can now view and download portions of your medical record. 10. Click the Download Summary menu link to download a portable copy of your medical information. If you have questions, please visit the Frequently Asked Questions section of the Medigo website. Remember, Medigo is NOT to be used for urgent needs. For medical emergencies, dial 911. Now available from your iPhone and Android! Please provide this summary of care documentation to your next provider. Your primary care clinician is listed as [address]. If you have any questions after today's visit, please call [phone].

## 2018-01-16 NOTE — PATIENT INSTRUCTIONS

## 2018-01-18 LAB
25(OH)D3+25(OH)D2 SERPL-MCNC: 15.7 NG/ML (ref 30–100)
ALBUMIN SERPL-MCNC: 4.4 G/DL (ref 3.5–4.7)
ALBUMIN/GLOB SERPL: 1.9 {RATIO} (ref 1.2–2.2)
ALP SERPL-CCNC: 64 IU/L (ref 39–117)
ALT SERPL-CCNC: 12 IU/L (ref 0–32)
AST SERPL-CCNC: 22 IU/L (ref 0–40)
BASOPHILS # BLD AUTO: 0 X10E3/UL (ref 0–0.2)
BASOPHILS NFR BLD AUTO: 1 %
BILIRUB SERPL-MCNC: <0.2 MG/DL (ref 0–1.2)
BUN SERPL-MCNC: 13 MG/DL (ref 8–27)
BUN/CREAT SERPL: 24 (ref 12–28)
CALCIUM SERPL-MCNC: 10.1 MG/DL (ref 8.7–10.3)
CHLORIDE SERPL-SCNC: 103 MMOL/L (ref 96–106)
CO2 SERPL-SCNC: 24 MMOL/L (ref 18–29)
CREAT SERPL-MCNC: 0.55 MG/DL (ref 0.57–1)
EOSINOPHIL # BLD AUTO: 0 X10E3/UL (ref 0–0.4)
EOSINOPHIL NFR BLD AUTO: 1 %
ERYTHROCYTE [DISTWIDTH] IN BLOOD BY AUTOMATED COUNT: 15.2 % (ref 12.3–15.4)
FOLATE SERPL-MCNC: >20 NG/ML
GLOBULIN SER CALC-MCNC: 2.3 G/DL (ref 1.5–4.5)
GLUCOSE SERPL-MCNC: 96 MG/DL (ref 65–99)
HCT VFR BLD AUTO: 39.8 % (ref 34–46.6)
HGB BLD-MCNC: 13.4 G/DL (ref 11.1–15.9)
IMM GRANULOCYTES # BLD: 0 X10E3/UL (ref 0–0.1)
IMM GRANULOCYTES NFR BLD: 0 %
LYMPHOCYTES # BLD AUTO: 1.7 X10E3/UL (ref 0.7–3.1)
LYMPHOCYTES NFR BLD AUTO: 40 %
MCH RBC QN AUTO: 28.8 PG (ref 26.6–33)
MCHC RBC AUTO-ENTMCNC: 33.7 G/DL (ref 31.5–35.7)
MCV RBC AUTO: 86 FL (ref 79–97)
MONOCYTES # BLD AUTO: 0.3 X10E3/UL (ref 0.1–0.9)
MONOCYTES NFR BLD AUTO: 6 %
NEUTROPHILS # BLD AUTO: 2.2 X10E3/UL (ref 1.4–7)
NEUTROPHILS NFR BLD AUTO: 52 %
PLATELET # BLD AUTO: 249 X10E3/UL (ref 150–379)
POTASSIUM SERPL-SCNC: 4.4 MMOL/L (ref 3.5–5.2)
PROT SERPL-MCNC: 6.7 G/DL (ref 6–8.5)
RBC # BLD AUTO: 4.65 X10E6/UL (ref 3.77–5.28)
SODIUM SERPL-SCNC: 142 MMOL/L (ref 134–144)
TSH SERPL DL<=0.005 MIU/L-ACNC: 2 UIU/ML (ref 0.45–4.5)
VIT B12 SERPL-MCNC: 394 PG/ML (ref 232–1245)
WBC # BLD AUTO: 4.2 X10E3/UL (ref 3.4–10.8)

## 2018-01-18 NOTE — PROGRESS NOTES
Only concern with labs was very low vitamin D levels. I recommend 1 of 2 options. 1. Vitamin D 2000 international units daily OR  2. Vitamin D 50,000 international units weekly. #1 is OTC  #2 is a script. Other labs were excellent! !!

## 2018-01-22 ENCOUNTER — OFFICE VISIT (OUTPATIENT)
Dept: NEUROLOGY | Age: 83
End: 2018-01-22

## 2018-01-22 VITALS
RESPIRATION RATE: 18 BRPM | TEMPERATURE: 98.4 F | HEIGHT: 66 IN | BODY MASS INDEX: 25.05 KG/M2 | WEIGHT: 155.9 LBS | SYSTOLIC BLOOD PRESSURE: 166 MMHG | DIASTOLIC BLOOD PRESSURE: 94 MMHG | HEART RATE: 81 BPM | OXYGEN SATURATION: 97 %

## 2018-01-22 DIAGNOSIS — R26.9 GAIT DIFFICULTY: Primary | ICD-10-CM

## 2018-01-22 DIAGNOSIS — G62.9 NEUROPATHY: ICD-10-CM

## 2018-01-22 DIAGNOSIS — R41.3 MEMORY CHANGES: ICD-10-CM

## 2018-01-22 DIAGNOSIS — R26.9 GAIT DIFFICULTY: ICD-10-CM

## 2018-01-22 NOTE — MR AVS SNAPSHOT
Romana Halo 
 
 
 [place] [address] [facility] [address] [phone] Patient: Adonis Soto MRN: [id number] NRB:[id number] Visit Information Date & Time Provider Department Dept. Phone Encounter #  
 1/22/2018 10:00 AM Sumit Sylvester MD [facility] Neurology [facility] [phone] [id number] Follow-up Instructions Return in about 2 months (around 3/22/2018). Upcoming Health Maintenance Date Due DTaP/Tdap/Td series (1 - Tdap) 3/30/1952 GLAUCOMA SCREENING Q2Y 3/30/1996 OSTEOPOROSIS SCREENING (DEXA) 3/30/1996 Pneumococcal 65+ Low/Medium Risk (2 of 2 - PCV13) 9/1/2015 MEDICARE YEARLY EXAM 4/5/2018 Allergies as of 1/22/2018  Review Complete On: 1/22/2018 By: Sumit Sylvester MD  
  
 Severity Noted Reaction Type Reactions Hyzaar [Losartan-hydrochlorothiazide] High 05/06/2016    Vertigo Pseudoephedrine High 03/14/2012   Systemic Other (comments) Rapid heart rate. Naprosyn [Naproxen]  01/22/2018    Not Reported This Time Current Immunizations  Reviewed on 4/4/2017 Name Date Influenza High Dose Vaccine PF 11/14/2017, 9/15/2015 Pneumococcal Polysaccharide (PPSV-23) 9/1/2014 Not reviewed this visit You Were Diagnosed With   
  
 Codes Comments Gait difficulty    -  Primary ICD-10-CM: R26.9 ICD-9-CM: 865. 2 Memory changes     ICD-10-CM: R41.3 ICD-9-CM: 780.93 Neuropathy     ICD-10-CM: G62.9 ICD-9-CM: 952. 9 Vitals BP Pulse Temp Resp Height(growth percentile) Weight(growth percentile) (!) 166/94 81 98.4 °F (36.9 °C) (Oral) 18 5' 6\" (1.676 m) 155 lb 14.4 oz (70.7 kg) SpO2 BMI OB Status Smoking Status 97% 25.16 kg/m2 Postmenopausal Former Smoker Vitals History BMI and BSA Data Body Mass Index Body Surface Area  
 25.16 kg/m 2 1.81 m 2 Preferred Pharmacy Pharmacy Name Phone [address] [place] [phone] Your Updated Medication List  
  
   
This list is accurate as of: 1/22/18 11:07 AM.  Always use your most recent med list.  
  
  
  
  
 BC HEADACHE POWDER PO Take  by mouth.  
  
 lisinopril 10 mg tablet Commonly known as:  PRINIVIL, ZESTRIL  
TAKE ONE TABLET BY MOUTH ONCE DAILY MULTIGEN FOLIC Tab capsule Generic drug:  Iron AspGly-VitC-B12-FA-Ca-Suc  
TAKE ONE TABLET BY MOUTH ONCE DAILY FOR  90  DAYS We Performed the Following , DIRECT, W/REFLEX I798910 CPT(R)] ANCA PANEL C6063034 CPT(R)] C REACTIVE PROTEIN, QT [81478 CPT(R)] GLUCOSE TOLERANCE (3 SP BLOOD) [id number] CPT(R)] METABOLIC PANEL, COMPREHENSIVE [03421 CPT(R)] PROTEIN ELECTROPHORESIS [87072 CPT(R)] REFERRAL TO PSYCHOLOGY [IGQ32 Custom] Comments:  
 Memory dysfunction that has involved. RHEUMATOID FACTOR, QL [id number] CPT(R)] SJOGREN'S ABS, SSA AND SSB [[id number] Custom] Follow-up Instructions Return in about 2 months (around 3/22/2018). To-Do List   
 01/22/2018 Lab:  SED RATE (ESR)   
  
 01/24/2018 Neurology:  NCV SENSORY OR MIXED   
  
 01/29/2018 Imaging:  MRI BRAIN WO CONT   
  
 01/29/2018 Imaging:  MRI LUMB SPINE WO CONT Referral Information Referral ID Referred By Referred To  
  
 [id number] Олег Chand [address] [place] [address] [address], [place] [address] Phone: [phone] Fax: [phone] Visits Status Start Date End Date 1 Authorized 1/22/18 1/22/19 If your referral has a status of pending review or denied, additional information will be sent to support the outcome of this decision. Patient Instructions Information Regarding Testing If you have physican order for a test or a medication denied by your insurance company, this does not mean the test or medication is not appropriate for you as that is a medical decision, not a decision to be made by an insurance company representative or by an [address] [place] [address] who has not interviewed and examined you. This is a decision to be made between you and your physician. The denial of services is a contractual matter between you and your insurance company, not an issue between your physician and the insurance company. If your test or medication is denied, you can take the following steps to help resolve the issue: 1. File a complaint with the [facility] of Insurance regarding your insurance company's denial of services ordered for you. You can do this either by calling them directly or by completing an on-line complaint form on the SendMe. This can be found at [email or web address] 2. Also file a formal complaint with your insurance company and ask to have the name of the person denying the service so that you may explore a legal option should you be harmed by this denial of service. Again, the fact the insurance company will not pay for the service does not mean it is not medically necessary and I would encourage you to follow through with the plan that was made with your physician 3. File a written complaint with your employer so your employer and benefit manager is aware of the poor coverage they are providing their employees. If you have medicare/medicaid, complain to your representative in the House and to your Debby Sosa. If we have ordered testing for you, we do not call patients with results and we do not give test results over the phone. We schedule follow up appointments so that your results can be discussed in person and any questions you have regarding them may be addressed. If something of concern is revealed on your test, we will call you for a sooner follow up appointment.   Additionally, results may be found by using the My Chart feature and one of our patient service representatives at the  can give you instructions on how to access this feature of our electronic medical record system. Learning About the Symptoms of Dementia What is dementia? We all forget things as we get older. Many older people have a slight loss of memory that does not affect their daily lives. But memory loss that gets worse may mean that you have dementia. Dementia is a loss of mental skills that affects your daily life. It can cause problems with memory, problem-solving, and learning. It also can cause problems with thinking and planning. Dementia usually gets worse over time. But how quickly it gets worse is different for each person. Some people stay the same for years. Others lose skills quickly. Your chances of having dementia rise as you get older. But this doesn't mean that everyone will get it. What causes dementia? Dementia is caused by damage to or changes in the brain. Alzheimer's disease is the most common kind of dementia. Alzheimer's causes a steady loss of memory and how well you can speak, think, and do your daily activities. The second most common kind of dementia is caused by a series of strokes. It's called vascular dementia. It often gets worse step by step. With each new stroke, more mental skills are lost. 
Serious head injuries in the past can sometimes lead to dementia, too. What are the symptoms? Usually the first symptom of dementia is memory loss. Often the person who has the memory problem doesn't notice it, but family and friends do. People who have dementia may have increasing trouble with: 
· Recalling recent events. They may forget appointments or lose objects. · Recognizing people and places. · Keeping up with conversations and activity. · Finding their way around familiar places, or driving to and from places they know well. · Keeping up personal care such as grooming or bathing. · Planning and carrying out routine tasks. They may have trouble following a recipe or writing a letter or email. What are some next steps? If you are worried about memory loss, see your doctor soon. He or she can do a physical exam and ask questions about recent and past illnesses and life events. Think about bringing someone to your doctor's appointment to take notes for you. Your doctor may suggest a series of tests to measure memory changes over time. These tests give the doctor an overall picture of how well your brain is working. The doctor can use the results to decide the best treatment program and help make your life safer and easier. It is important to know that memory loss can be caused by things other than dementia. These things can include health problems such as depression, a low amount of thyroid hormone, and some infections. When those things are treated, your ability to remember can get better. Follow-up care is a key part of your treatment and safety. Be sure to make and go to all appointments, and call your doctor if you are having problems. It's also a good idea to know your test results and keep a list of the medicines you take. Where can you learn more? Go to http://katrin-connie.info/. Enter [id number] [phone] in the search box to learn more about \"Learning About the Symptoms of Dementia. \" Current as of: May 12, 2017 Content Version: 11.4 © 1418-8453 [employer]wise, [employer]. Care instructions adapted under license by [employer] (which disclaims liability or warranty for this information). If you have questions about a medical condition or this instruction, always ask your healthcare professional. [place] [address] any warranty or liability for your use of this information. Kathrine Castellon 2176 What is a living will?  
 
A living will is a legal form you use to write down the kind of care you want at the end of your life. It is used by the health professionals who will treat you if you aren't able to decide for yourself. If you put your wishes in writing, your loved ones and others will know what kind of care you want. They won't need to guess. This can ease your mind and be helpful to others. A living will is not the same as an estate or property will. An estate will explains what you want to happen with your money and property after you die. Is a living will a legal document? A living will is a legal document. Each state has its own laws about living wylie. If you move to another state, make sure that your living will is legal in the state where you now live. Or you might use a universal form that has been approved by many states. This kind of form can sometimes be completed and stored online. Your electronic copy will then be available wherever you have a connection to the Internet. In most cases, doctors will respect your wishes even if you have a form from a different state. · You don't need an  to complete a living will. But legal advice can be helpful if your state's laws are unclear, your health history is complicated, or your family can't agree on what should be in your living will. · You can change your living will at any time. Some people find that their wishes about end-of-life care change as their health changes. · In addition to making a living will, think about completing a medical power of  form. This form lets you name the person you want to make end-of-life treatment decisions for you (your \"health care agent\") if you're not able to. Many hospitals and nursing homes will give you the forms you need to complete a living will and a medical power of . · Your living will is used only if you can't make or communicate decisions for yourself anymore.  If you become able to make decisions again, you can accept or refuse any treatment, no matter what you wrote in your living will. · Your state may offer an online registry. This is a place where you can store your living will online so the doctors and nurses who need to treat you can find it right away. What should you think about when creating a living will? Talk about your end-of-life wishes with your family members and your doctor. Let them know what you want. That way the people making decisions for you won't be surprised by your choices. Think about these questions as you make your living will: · Do you know enough about life support methods that might be used? If not, talk to your doctor so you know what might be done if you can't breathe on your own, your heart stops, or you're unable to swallow. · What things would you still want to be able to do after you receive life-support methods? Would you want to be able to walk? To speak? To eat on your own? To live without the help of machines? · If you have a choice, where do you want to be cared for? In your home? At a hospital or nursing home? · Do you want certain Christianity practices performed if you become very ill? · If you have a choice at the end of your life, where would you prefer to die? At home? In a hospital or nursing home? Somewhere else? · Would you prefer to be buried or cremated? · Do you want your organs to be donated after you die? What should you do with your living will? · Make sure that your family members and your health care agent have copies of your living will. · Give your doctor a copy of your living will to keep in your medical record. If you have more than one doctor, make sure that each one has a copy. · You may want to put a copy of your living will where it can be easily found. Where can you learn more? Go to http://katrin-connie.info/. Enter M170 in the search box to learn more about \"Learning About Living [place]. \" 
 Current as of: September 24, 2016 Content Version: 11.4 © 8358-6309 [employer]. Care instructions adapted under license by [employer] (which disclaims liability or warranty for this information). If you have questions about a medical condition or this instruction, always ask your healthcare professional. [address] any warranty or liability for your use of this information. Patient history reviewed and patient examined. I have several potential leads but I do not have a strong feeling on first visit as to what is leading to the falling history. Will check out memory dysfunction at this time as well which in some people could be a critical issue that enhances falling predisposition. Introducing [facility]! Joe Washington introduces Member Desk patient portal. Now you can access parts of your medical record, email your doctor's office, and request medication refills online. 1. In your internet browser, go to [email or web address] 2. Click on the First Time User? Click Here link in the Sign In box. You will see the New Member Sign Up page. 3. Enter your Member Desk Access Code exactly as it appears below. You will not need to use this code after youve completed the sign-up process. If you do not sign up before the expiration date, you must request a new code. · Member Desk Access Code: [facility] Expires: 4/16/2018  4:19 PM 
 
4. Enter the last four digits of your Social Security Number (xxxx) and Date of Birth (mm/dd/yyyy) as indicated and click Submit. You will be taken to the next sign-up page. 5. Create a [email or web address] ID. This will be your Member Desk login ID and cannot be changed, so think of one that is secure and easy to remember. 6. Create a Member Desk password. You can change your password at any time. 7. Enter your Password Reset Question and Answer. This can be used at a later time if you forget your password. 8. Enter your e-mail address. You will receive e-mail notification when new information is available in [address] 9. Click Sign Up. You can now view and download portions of your medical record. 10. Click the Download Summary menu link to download a portable copy of your medical information. If you have questions, please visit the Frequently Asked Questions section of the [employer]Runner website. Remember, ListRunner is NOT to be used for urgent needs. For medical emergencies, dial 911. Now available from your iPhone and Android! Please provide this summary of care documentation to your next provider. Your primary care clinician is listed as [address]. If you have any questions after today's visit, please call [phone].

## 2018-01-22 NOTE — PATIENT INSTRUCTIONS
Information Regarding Testing     If you have physican order for a test or a medication denied by your insurance company, this does not mean the test or medication is not appropriate for you as that is a medical decision, not a decision to be made by an insurance company representative or by an [facility] physician who has not interviewed and examined you. This is a decision to be made between you and your physician. The denial of services is a contractual matter between you and your insurance company, not an issue between your physician and the insurance company. If your test or medication is denied, you can take the following steps to help resolve the issue:    1. File a complaint with the [facility] regarding your insurance company's denial of services ordered for you. You can do this either by calling them directly or by completing an on-line complaint form on the [employer]. This can be found at [email or web address]    2. Also file a formal complaint with your insurance company and ask to have the name of the person denying the service so that you may explore a legal option should you be harmed by this denial of service. Again, the fact the insurance company will not pay for the service does not mean it is not medically necessary and I would encourage you to follow through with the plan that was made with your physician    3. File a written complaint with your employer so your employer and benefit manager is aware of the poor coverage they are providing their employees. If you have medicare/medicaid, complain to your representative in the House and to your Debby Sosa. If we have ordered testing for you, we do not call patients with results and we do not give test results over the phone. We schedule follow up appointments so that your results can be discussed in person and any questions you have regarding them may be addressed.   If something of concern is revealed on your test, we will call you for a sooner follow up appointment. Additionally, results may be found by using the My Chart feature and one of our patient service representatives at the  can give you instructions on how to access this feature of our electronic medical record system. Learning About the Symptoms of Dementia  What is dementia? We all forget things as we get older. Many older people have a slight loss of memory that does not affect their daily lives. But memory loss that gets worse may mean that you have dementia. Dementia is a loss of mental skills that affects your daily life. It can cause problems with memory, problem-solving, and learning. It also can cause problems with thinking and planning. Dementia usually gets worse over time. But how quickly it gets worse is different for each person. Some people stay the same for years. Others lose skills quickly. Your chances of having dementia rise as you get older. But this doesn't mean that everyone will get it. What causes dementia? Dementia is caused by damage to or changes in the brain. Alzheimer's disease is the most common kind of dementia. Alzheimer's causes a steady loss of memory and how well you can speak, think, and do your daily activities. The second most common kind of dementia is caused by a series of strokes. It's called vascular dementia. It often gets worse step by step. With each new stroke, more mental skills are lost.  Serious head injuries in the past can sometimes lead to dementia, too. What are the symptoms? Usually the first symptom of dementia is memory loss. Often the person who has the memory problem doesn't notice it, but family and friends do. People who have dementia may have increasing trouble with:  · Recalling recent events. They may forget appointments or lose objects. · Recognizing people and places. · Keeping up with conversations and activity.   · Finding their way around familiar places, or driving to and from places they know well. · Keeping up personal care such as grooming or bathing. · Planning and carrying out routine tasks. They may have trouble following a recipe or writing a letter or email. What are some next steps? If you are worried about memory loss, see your doctor soon. He or she can do a physical exam and ask questions about recent and past illnesses and life events. Think about bringing someone to your doctor's appointment to take notes for you. Your doctor may suggest a series of tests to measure memory changes over time. These tests give the doctor an overall picture of how well your brain is working. The doctor can use the results to decide the best treatment program and help make your life safer and easier. It is important to know that memory loss can be caused by things other than dementia. These things can include health problems such as depression, a low amount of thyroid hormone, and some infections. When those things are treated, your ability to remember can get better. Follow-up care is a key part of your treatment and safety. Be sure to make and go to all appointments, and call your doctor if you are having problems. It's also a good idea to know your test results and keep a list of the medicines you take. Where can you learn more? Go to http://katrin-connie.info/. Enter [id number] in the search box to learn more about \"Learning About the Symptoms of Dementia. \"  Current as of: May 12, 2017  Content Version: 11.4  © 6576-6256 [employer]. Care instructions adapted under license by [employer] (which disclaims liability or warranty for this information). If you have questions about a medical condition or this instruction, always ask your healthcare professional. [place] [address] any warranty or liability for your use of this information.        Learning About Living [place]  What is a living will? A living will is a legal form you use to write down the kind of care you want at the end of your life. It is used by the health professionals who will treat you if you aren't able to decide for yourself. If you put your wishes in writing, your loved ones and others will know what kind of care you want. They won't need to guess. This can ease your mind and be helpful to others. A living will is not the same as an estate or property will. An estate will explains what you want to happen with your money and property after you die. Is a living will a legal document? A living will is a legal document. Each state has its own laws about living wylie. If you move to another state, make sure that your living will is legal in the state where you now live. Or you might use a universal form that has been approved by many states. This kind of form can sometimes be completed and stored online. Your electronic copy will then be available wherever you have a connection to the Internet. In most cases, doctors will respect your wishes even if you have a form from a different state. · You don't need an  to complete a living will. But legal advice can be helpful if your state's laws are unclear, your health history is complicated, or your family can't agree on what should be in your living will. · You can change your living will at any time. Some people find that their wishes about end-of-life care change as their health changes. · In addition to making a living will, think about completing a medical power of  form. This form lets you name the person you want to make end-of-life treatment decisions for you (your \"health care agent\") if you're not able to. Many hospitals and nursing homes will give you the forms you need to complete a living will and a medical power of . · Your living will is used only if you can't make or communicate decisions for yourself anymore.  If you become able to make decisions again, you can accept or refuse any treatment, no matter what you wrote in your living will. · Your state may offer an online registry. This is a place where you can store your living will online so the doctors and nurses who need to treat you can find it right away. What should you think about when creating a living will? Talk about your end-of-life wishes with your family members and your doctor. Let them know what you want. That way the people making decisions for you won't be surprised by your choices. Think about these questions as you make your living will:  · Do you know enough about life support methods that might be used? If not, talk to your doctor so you know what might be done if you can't breathe on your own, your heart stops, or you're unable to swallow. · What things would you still want to be able to do after you receive life-support methods? Would you want to be able to walk? To speak? To eat on your own? To live without the help of machines? · If you have a choice, where do you want to be cared for? In your home? At a hospital or nursing home? · Do you want certain Yazdanism practices performed if you become very ill? · If you have a choice at the end of your life, where would you prefer to die? At home? In a hospital or nursing home? Somewhere else? · Would you prefer to be buried or cremated? · Do you want your organs to be donated after you die? What should you do with your living will? · Make sure that your family members and your health care agent have copies of your living will. · Give your doctor a copy of your living will to keep in your medical record. If you have more than one doctor, make sure that each one has a copy. · You may want to put a copy of your living will where it can be easily found. Where can you learn more? Go to http://katrin-connie.info/.   Enter Y920 in the search box to learn more about \"Learning About Living [place].\"  Current as of: September 24, 2016  Content Version: 11.4  © 9727-4836 [employer]. Care instructions adapted under license by [employer] (which disclaims liability or warranty for this information). If you have questions about a medical condition or this instruction, always ask your healthcare professional. [place] [address] any warranty or liability for your use of this information. Patient history reviewed and patient examined. I have several potential leads but I do not have a strong feeling on first visit as to what is leading to the falling history. Will check out memory dysfunction at this time as well which in some people could be a critical issue that enhances falling predisposition.

## 2018-01-22 NOTE — PROGRESS NOTES
Neurology Consult      Subjective:      Elvie Root is a 80 y.o. female who comes in with gait dysfunction over 2 years. Has had 2 falls in 2017 and one in 2016. Some of these were on level ground but to were on steps. She feels as if her reaction time is poor and does not rush herself in any given direction or his fall prone. No history for orthostatic hypotension as an loss of consciousness or near syncope. Has no low back pain or neck discomfort. Has had long-term bladder incontinence but is never been checked out. No history of stroke or traumatic brain injury and no known case of neuropathy although please see exam findings below. Saw me remotely 20 years ago for what sounded like spontaneous intracranial hypertension that required a blood patch. Says her vision is reasonable although has occasional issues with her vision that are followed by ophthalmology and I neither notes. Says her vision is not a rate limiting step to her walking. Had a head CT in March 2012 for vision changes that was unrevealing. Recent primary care labs show diminished vitamin D but normal TSH B12 folate CBC and a CMP. Says she questions her memory at times and had near relatives that had memory issues as well. Currently no history for rate limiting arthritis as in her hips knees or ankles. No history of vertigo as an vestibulopathy. No strong deconditioning history and does not use DME or similar equipment to get around. Current Outpatient Prescriptions   Medication Sig Dispense Refill    ASPIRIN/SALICYLAMIDE/CAFFEINE (BC HEADACHE POWDER PO) Take  by mouth.  MULTIGEN FOLIC tab capsule TAKE ONE TABLET BY MOUTH ONCE DAILY FOR  90  DAYS 90 Tab 1    lisinopril (PRINIVIL, ZESTRIL) 10 mg tablet TAKE ONE TABLET BY MOUTH ONCE DAILY 30 Tab 5      Allergies   Allergen Reactions    Hyzaar [Losartan-Hydrochlorothiazide] Vertigo    Pseudoephedrine Other (comments)     Rapid heart rate.      Naprosyn [Naproxen] Not Reported This Time     Past Medical History:   Diagnosis Date    Anemia     Sees Dr. Starr Lopez and is on a prescription multivitamin call Multigen which has corrected her anemia    Headache     Hypertension     Intracranial hypotension 1998    had blood patch to correct    Meningitis 1950 & 2011    Osteochondritis 1975    Snoring       Past Surgical History:   Procedure Laterality Date    HX CATARACT REMOVAL  1997    HX DILATION AND CURETTAGE  1972    HX OTHER SURGICAL  1975    osteochroditis    HX OTHER SURGICAL  1998    blood patch for intercranial spontaneious hypotension       Social History     Social History    Marital status:      Spouse name: N/A    Number of children: N/A    Years of education: N/A     Occupational History    Not on file. Social History Main Topics    Smoking status: Former Smoker    Smokeless tobacco: Never Used    Alcohol use 0.6 oz/week     0 Standard drinks or equivalent, 1 Glasses of wine per week    Drug use: No    Sexual activity: No     Other Topics Concern    Not on file     Social History Narrative      Family History   Problem Relation Age of Onset    Lung Disease Brother      lung cancer    Diabetes Mother     Thyroid Disease Mother      goiter    Diabetes Sister     Heart Disease Sister     Heart Disease Father     Stroke Father     Hypertension Brother     Stroke Brother     Cancer Brother     Parkinson's Disease Sister     Other Sister      brain aneurysm    Alzheimer Sister       Visit Vitals    BP (!) 166/94    Pulse 81    Temp 98.4 °F (36.9 °C) (Oral)    Resp 18    Ht 5' 6\" (1.676 m)    Wt 70.7 kg (155 lb 14.4 oz)    SpO2 97%    BMI 25.16 kg/m2        Review of Systems:   A comprehensive review of systems was negative except for that written in the HPI. Neuro Exam:     Appearance: The patient is well developed, well nourished, provides a partial coherent history and is in no acute distress.    Mental Status: Oriented to month and year only. Ok to place and person. Mood and affect appropriate. Cranial Nerves:   Intact visual fields. Fundi are benign. JORGE, EOM's full but a L exotropia, no nystagmus, no ptosis. Facial sensation is normal. Corneal reflexes are intact. Facial movement is symmetric. Hearing is normal bilaterally. Palate is midline with normal sternocleidomastoid and trapezius muscles are normal. Tongue is midline. Motor:  5/5 strength in upper and lower proximal and distal muscles. Normal bulk and tone. No fasciculations. Reflexes:   Deep tendon reflexes 2+/4 and symmetrical with absent ankle jerks. Sensory:    Diminished distally to touch, pinprick and vibration . Position sense hesitant and right foot and vibratory significantly diminished in both feet but hard to quantify based on patient's responses. Gait:   Patient's transfers are taken slow and cautious short spaced very attentive visually to the floor in front of her step to step. Romberg grossly negative. Tremor:   No tremor noted. Cerebellar:  No cerebellar signs present. Neurovascular:  Normal heart sounds and regular rhythm, peripheral pulses intact, and no carotid bruits. Toes downgoing no clonus no Camilo's. Straight leg raising -90°. Assessment:   Gait dysfunction. Concerns on first encounter go along the possibilities of peripheral neuropathy and spinal stenosis and central nervous system issues such as normal pressure hydrocephalus etc. will need eye notes to see if there is any rate limiting visual dysfunction she has by ophthalmology. Concerns regarding memory impairment could enhance fall predisposition as well. Will check neuropsych testing in addition to the labs and brain imaging. Will do EMG nerve conduction to check out peripheral nerve integrity. Plan:   Revisit in about 2 months.   Signed by :  Javid Smith MD

## 2018-01-24 ENCOUNTER — OFFICE VISIT (OUTPATIENT)
Dept: NEUROLOGY | Age: 83
End: 2018-01-24

## 2018-01-24 DIAGNOSIS — G62.9 ACQUIRED POLYNEUROPATHY: Primary | ICD-10-CM

## 2018-01-24 DIAGNOSIS — R26.9 GAIT DIFFICULTY: ICD-10-CM

## 2018-01-24 NOTE — PROGRESS NOTES
This was an elective EMG nerve conduction study of both lower extremities. Patient history and exam.    This is an 55-year-old individual who has had over 2 years worth of gait dysfunction. Feels uncomfortable on her feet especially if she rushes herself. Has had 2 falls in 2017 and one in 2016. No orthostatic hypotension history. Vision is reasonable and not a rate limiting performance feature. No vertigo in the picture no low back pain and currently no major arthritic issues at stake. No peripheral claudication history or deconditioning storyline. Does have some concern about her memory and sounds like she shares this with other family members that are contemporaries. This is being evaluated. Limb control is good otherwise and does not complain of subjective numbness here or there. No Parkinson-like features or movement disorder noteworthy. Exam: This is an elderly white female alert cooperative fluent articulate upbeat and otherwise appropriate. Cranial nerves II through XII appear to be normal.  Cerebellar testing is appropriately on target. Motor is 5/5 without exception. 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 Reflexes present approaching +2 but absent ankle jerks bilaterally. Toes downgoing no clonus no Camilo's. Romberg negative. Labs currently pending as is memory workup. No family history of primary gait dysfunction. Medications currently appear reasonable and there is no issue with smoking and drinking. EMG and nerve conduction findings:    1. The EMG portion involved sampling representative muscles in both legs. Insertional activity at rest and with probing was completely normal.  No activation of spontaneous activity and nothing to support acute denervation chronic denervation/reinnervation or myopathic potentials. Motor unit recruitment was appropriate muscle to muscle as to morphology duration and number. Patient tolerated the procedure without issue and the quality of the study was not compromised with patient's degree of excellent cooperation. 2.  The nerve conduction portion was revealing in so far as there were no competent sensory responses detected for either lower extremity as it went to the sural sensory's or the right and left superior fibular anti-sensory responses. Both tibial F wave responses were prolonged left greater than right. And there was a delay in the right tibial H response compared with left. Impression: 1. This was an abnormal study that showed lack of capture of sensory responses in either lower extremity and would suggest a sensory neuropathy for consideration at this time. 2.The mild prolongation of the tibial F waves response is unknown with otherwise normal motor performance by exam and in this particular encounter with NCV data as is. 3.  The slightly delayed right tibial H response compared with left may represent a right S1 neuropathy feature. Clinical correlation is advised.       Alyssia Coburn MD.

## 2018-01-24 NOTE — PROGRESS NOTES
EMG/ NCS Report  [facility], [address]  Ph: [phone]  FAX: [phone]/ [phone]  Test Date:  2018    Patient: Alex Shabazz : 3/30/1931 Physician: Reinier Moreno MD   Sex: Female Height: ' \" Ref Phys: Neil Young IV, MD   ID#: [id number] Weight:  lbs. Technician: Prabha Rey     Patient History / Exam:  CC:ANDRES LOWERS,FALLS X 2 YRS. EMG & NCV Findings:  Evaluation of the left Fibular motor and the right Fibular motor nerves showed normal distal onset latency (L3.8, R5.0 ms), normal amplitude (L3.5, R1.7 mV), normal conduction velocity (B Fib-Ankle, L45, R47 m/s), and normal conduction velocity (Poplt-B Fib, L43, R50 m/s). The left tibial motor nerve showed normal distal onset latency (3.7 ms), normal amplitude (2.9 mV), and normal conduction velocity (Knee-Ankle, 43 m/s). The right tibial motor nerve showed normal distal onset latency (4.2 ms), normal amplitude (5.0 mV), and decreased conduction velocity (Knee-Ankle, 37 m/s). The left Sup Fibular sensory nerve showed no response (Lower leg), no response (Site 2), and no response (Site 3). The right Sup Fibular sensory nerve showed no response (Lower leg) and no response (Site 2). The left sural sensory and the right sural sensory nerves showed no response (Calf) and no response (Site 2). F Wave studies indicate that the left tibial F wave has prolonged latency (60.23 ms). The right tibial F wave has prolonged latency (57.26 ms). All F Wave left vs. right side latency differences were within normal limits. Left vs. Right comparison data for the tibial H-reflex indicates abnormal L-R latency difference (4.13 ms). All examined muscles (as indicated in the following table) showed no evidence of electrical instability.         Impression:        ___________________________  Reinier Moreno MD      Nerve Conduction Studies  Anti Sensory Summary Table     Stim Site NR Peak (ms) Norm Peak (ms) P-T Amp (µV) Norm P-T Amp Site1 Site2 Dist (cm)   Left Sup Fibular Anti Sensory (Lat ankle)  32.5°C   Lower leg NR  <4.6  >4 Lower leg Lat ankle 10.0   Site 2 NR          Site 3 NR          Right Sup Fibular Anti Sensory (Lat ankle)  31.9°C   Lower leg NR  <4.6  >4 Lower leg Lat ankle 10.0   Site 2 NR          Left Sural Anti Sensory (Lat Mall)  33.4°C   Calf NR  <4.5  >4.0 Calf Lat Mall 14.0   Site 2 NR          Right Sural Anti Sensory (Lat Mall)  32.7°C   Calf NR  <4.5  >4.0 Calf Lat Mall 14.0   Site 2 NR            Motor Summary Table     Stim Site NR Onset (ms) Norm Onset (ms) O-P Amp (mV) Norm O-P Amp Amp (Prev) (%) Site1 Site2 Dist (cm) Zak (m/s) Norm Zak (m/s)   Left Fibular Motor (Ext Dig Brev)  32.3°C   Ankle    3.8 <6.5 3.5 >1.1 100.0 Ankle Ext Dig Brev 8.0     B Fib    10.5  2.8  80.0 B Fib Ankle 30.0 45 >38   Poplt    12.8  2.9  103.6 Poplt B Fib 10.0 43 >42   Right Fibular Motor (Ext Dig Brev)  35.8°C   Ankle    5.0 <6.5 1.7 >1.1 100.0 Ankle Ext Dig Brev 8.0     B Fib    11.4  2.0  117.6 B Fib Ankle 30.0 47 >38   Poplt    13.4  2.0  100.0 Poplt B Fib 10.0 50 >42   Left Tibial Motor (Abd Griffith Brev)  31.9°C   Ankle    3.7 <6.1 2.9 >1.1 100.0 Ankle Abd Griffith Brev 8.0     Knee    11.9  1.5  51.7 Knee Ankle 35.0 43 >39   Site 3    3.6  2.7  180.0        Right Tibial Motor (Abd Griffith Brev)  31.8°C   Ankle    4.2 <6.1 5.0 >1.1 100.0 Ankle Abd Griffith Brev 8.0     Knee    14.1  2.8  56.0 Knee Ankle 37.0 37 >39   Site 3    14.5  2.6  92.9          F Wave Studies     NR F-Lat (ms) Lat Norm (ms) L-R F-Lat (ms) L-R Lat Norm   Left Tibial (Mrkrs) (Abd Hallucis)  31.8°C      60.23 <56 2.97 <5.7   Right Tibial (Mrkrs) (Abd Hallucis)  31.5°C      57.26 <56 2.97 <5.7     H Reflex Studies     NR H-Lat (ms) L-R H-Lat (ms) L-R Lat Norm   Left Tibial (Gastroc)  31.6°C      35.87 4.13 <2.0   Right Tibial (Gastroc)  31.1°C      40.00 4.13 <2.0     EMG     Side Muscle Nerve Root Ins Act Fibs Psw Recrt Duration Amp Poly Comment   Right Ext Dig Brev Dp Br Peron L5, S1 Nml Nml Nml Nml Nml Nml Nml    Right AntTibialis Dp Br Peron L4-5 Nml Nml Nml Nml Nml Nml Nml    Right BicepsFemS Sciatic L5-S1 Nml Nml Nml Nml Nml Nml Nml    Right GluteusMed SupGluteal L4-S1 Nml Nml Nml Nml Nml Nml Nml    Right VastusMed Femoral L2-4 Nml Nml Nml Nml Nml Nml Nml    Right BicepsFemL Sciatic L5-S2 Nml Nml Nml Nml Nml Nml Nml    Left Ext Dig Brev Dp Br Peron L5, S1 Nml Nml Nml Nml Nml Nml Nml    Left AntTibialis Dp Br Peron L4-5 Nml Nml Nml Nml Nml Nml Nml    Left MedGastroc Tibial S1-2 Nml Nml Nml Nml Nml Nml Nml    Left VastusMed Femoral L2-4 Nml Nml Nml Nml Nml Nml Nml    Left BicepsFemS Sciatic L5-S1 Nml Nml Nml Nml Nml Nml Nml                Nerve Conduction Studies  Anti Sensory Left/Right Comparison     Stim Site L Lat (ms) R Lat (ms) L-R Lat (ms) L Amp (µV) R Amp (µV) L-R Amp (%) Site1 Site2 L Zak (m/s) R Zak (m/s) L-R Zak (m/s)   Sup Fibular Anti Sensory (Lat ankle)  32.5°C   Lower leg       Lower leg Lat ankle      Site 2              Site 3              Sural Anti Sensory (Lat Mall)  33.4°C   Calf       Calf Lat Mall      Site 2                Motor Left/Right Comparison     Stim Site L Lat (ms) R Lat (ms) L-R Lat (ms) L Amp (mV) R Amp (mV) L-R Amp (%) Site1 Site2 L Zak (m/s) R Zak (m/s) L-R Zak (m/s)   Fibular Motor (Ext Dig Brev)  32.3°C   Ankle 3.8 5.0 1.2 3.5 1.7 51.4 Ankle Ext Dig Brev      B Fib 10.5 11.4 0.9 2.8 2.0 28.6 B Fib Ankle 45 47 2   Poplt 12.8 13.4 0.6 2.9 2.0 31.0 Poplt B Fib 43 50 7   Tibial Motor (Abd Griffith Brev)  31.9°C   Ankle 3.7 4.2 0.5 2.9 5.0 42.0 Ankle Abd Griffith Brev      Knee 11.9 14.1 2.2 1.5 2.8 46.4 Knee Ankle 43 37 6   Site 3 3.6   2.7                Waveforms:

## 2018-01-26 LAB
ALBUMIN SERPL ELPH-MCNC: 3.7 G/DL (ref 2.9–4.4)
ALBUMIN SERPL-MCNC: 4.2 G/DL (ref 3.5–4.7)
ALBUMIN/GLOB SERPL: 1.2 {RATIO} (ref 0.7–1.7)
ALBUMIN/GLOB SERPL: 1.7 {RATIO} (ref 1.2–2.2)
ALP SERPL-CCNC: 66 IU/L (ref 39–117)
ALPHA1 GLOB SERPL ELPH-MCNC: 0.3 G/DL (ref 0–0.4)
ALPHA2 GLOB SERPL ELPH-MCNC: 0.8 G/DL (ref 0.4–1)
ALT SERPL-CCNC: 12 IU/L (ref 0–32)
ANA SER QL: NEGATIVE
AST SERPL-CCNC: 22 IU/L (ref 0–40)
B-GLOBULIN SERPL ELPH-MCNC: 1.2 G/DL (ref 0.7–1.3)
BILIRUB SERPL-MCNC: 0.3 MG/DL (ref 0–1.2)
BUN SERPL-MCNC: 13 MG/DL (ref 8–27)
BUN/CREAT SERPL: 19 (ref 12–28)
C-ANCA TITR SER IF: NORMAL TITER
CALCIUM SERPL-MCNC: 9.7 MG/DL (ref 8.7–10.3)
CHLORIDE SERPL-SCNC: 103 MMOL/L (ref 96–106)
CO2 SERPL-SCNC: 26 MMOL/L (ref 18–29)
CREAT SERPL-MCNC: 0.67 MG/DL (ref 0.57–1)
CRP SERPL-MCNC: 1.5 MG/L (ref 0–4.9)
ENA SS-A AB SER-ACNC: <0.2 AI (ref 0–0.9)
ENA SS-B AB SER-ACNC: <0.2 AI (ref 0–0.9)
ERYTHROCYTE [SEDIMENTATION RATE] IN BLOOD BY WESTERGREN METHOD: 14 MM/HR (ref 0–40)
GAMMA GLOB SERPL ELPH-MCNC: 0.8 G/DL (ref 0.4–1.8)
GFR SERPLBLD CREATININE-BSD FMLA CKD-EPI: 80 ML/MIN/1.73
GFR SERPLBLD CREATININE-BSD FMLA CKD-EPI: 92 ML/MIN/1.73
GLOBULIN SER CALC-MCNC: 2.5 G/DL (ref 1.5–4.5)
GLOBULIN SER CALC-MCNC: 3 G/DL (ref 2.2–3.9)
GLUCOSE 1.5H P 75 G GLC PO SERPL-MCNC: ABNORMAL MG/DL
GLUCOSE 1H P 75 G GLC PO SERPL-MCNC: 147 MG/DL (ref 65–199)
GLUCOSE 2H P 75 G GLC PO SERPL-MCNC: 125 MG/DL (ref 65–139)
GLUCOSE 30M P 75 G GLC PO SERPL-MCNC: ABNORMAL MG/DL
GLUCOSE 3H P 75 G GLC PO SERPL-MCNC: ABNORMAL MG/DL
GLUCOSE 4H P 75 G GLC PO SERPL-MCNC: ABNORMAL MG/DL
GLUCOSE 5H P 75 G GLC PO SERPL-MCNC: ABNORMAL MG/DL
GLUCOSE 6H P 75 G GLC PO SERPL-MCNC: ABNORMAL MG/DL
GLUCOSE P FAST SERPL-MCNC: 104 MG/DL (ref 65–99)
GLUCOSE SERPL-MCNC: 105 MG/DL (ref 65–99)
M PROTEIN SERPL ELPH-MCNC: NORMAL G/DL
MYELOPEROXIDASE AB SER IA-ACNC: <9 U/ML (ref 0–9)
P-ANCA ATYPICAL TITR SER IF: NORMAL TITER
P-ANCA TITR SER IF: NORMAL TITER
PLEASE NOTE, 011150: NORMAL
POTASSIUM SERPL-SCNC: 4.6 MMOL/L (ref 3.5–5.2)
PROT SERPL-MCNC: 6.7 G/DL (ref 6–8.5)
PROTEINASE3 AB SER IA-ACNC: <3.5 U/ML (ref 0–3.5)
RHEUMATOID FACT SERPL-ACNC: <10 IU/ML (ref 0–13.9)
SODIUM SERPL-SCNC: 144 MMOL/L (ref 134–144)

## 2018-01-29 ENCOUNTER — HOSPITAL ENCOUNTER (OUTPATIENT)
Dept: MRI IMAGING | Age: 83
Discharge: HOME | End: 2018-01-29
Attending: SPECIALIST
Payer: MEDICARE

## 2018-01-29 DIAGNOSIS — R26.9 GAIT DIFFICULTY: ICD-10-CM

## 2018-01-29 PROCEDURE — 70551 MRI BRAIN STEM W/O DYE: CPT

## 2018-01-29 PROCEDURE — 72148 MRI LUMBAR SPINE W/O DYE: CPT

## 2018-01-29 NOTE — PROGRESS NOTES
CC:  Chief Complaint   Patient presents with    Annual Wellness Visit    Head Pain     Headaches are getting worse    Neurologic Problem     abnormal gait, incontinence, leaning forward, vision, memory concentration decreasing; loss of words     HISTORY OF PRESENT ILLNESS  Gilda Elizabeth is a 80 y.o. female. HPI Comments: 80F who has a past medical history of Anemia; Headache; Hypertension; Intracranial hypotension (1998); Meningitis (1950 & 2011); Osteochondritis (1975); and Snoring. She had been under the care with Dr. Suellen Gomes (neurology) in the past for some of the issues listed above. She was last seen in our office for a medicare wellness visit on 4/2017. Today, she presents with c/o worsening headaches, gait abnormalities, incontinence, vision changes, memory and concentration problems and sometimes, she has a hard time with word finding. She denies any trauma to her head. No recent change in medications. She is worried about Normal Pressure Hypertension. She is here with her daughter and they voice concerns about this vs other neurological ailments. She has not had any falling. Head Pain      Neurologic Problem   Primary symptoms include memory loss. Associated symptoms include headaches. ROS:  Review of Systems   Neurological: Positive for sensory change and headaches. Psychiatric/Behavioral: Positive for memory loss. All other systems reviewed and are negative. OBJECTIVE:  /84 (BP 1 Location: Left arm, BP Patient Position: Standing) Comment: after 3 minutes prior to previous B/P  Pulse (!) 103  Temp 98.1 °F (36.7 °C) (Temporal)   Resp 20  Ht 5' 6\" (1.676 m)  Wt 161 lb (73 kg)  SpO2 96%  BMI 25.99 kg/m2  Physical Exam   HENT:   Head: Normocephalic. Eyes: Pupils are equal, round, and reactive to light. Neck: Normal range of motion. Cardiovascular: Normal rate and regular rhythm.     Pulmonary/Chest: Effort normal and breath sounds normal. Musculoskeletal: Normal range of motion. Neurological: She is alert. Skin: Skin is warm. Nursing note and vitals reviewed. LABS:  Results for orders placed or performed in visit on 01/16/18   THYROID CASCADE PROFILE   Result Value Ref Range    TSH 2.000 0.450 - 4.500 uIU/mL   VITAMIN B12 & FOLATE   Result Value Ref Range    Vitamin B12 394 232 - 1245 pg/mL    Folate >20.0 >3.0 ng/mL   CBC WITH AUTOMATED DIFF   Result Value Ref Range    WBC 4.2 3.4 - 10.8 x10E3/uL    RBC 4.65 3.77 - 5.28 x10E6/uL    HGB 13.4 11.1 - 15.9 g/dL    HCT 39.8 34.0 - 46.6 %    MCV 86 79 - 97 fL    MCH 28.8 26.6 - 33.0 pg    MCHC 33.7 31.5 - 35.7 g/dL    RDW 15.2 12.3 - 15.4 %    PLATELET 861 503 - 684 x10E3/uL    NEUTROPHILS 52 Not Estab. %    Lymphocytes 40 Not Estab. %    MONOCYTES 6 Not Estab. %    EOSINOPHILS 1 Not Estab. %    BASOPHILS 1 Not Estab. %    ABS. NEUTROPHILS 2.2 1.4 - 7.0 x10E3/uL    Abs Lymphocytes 1.7 0.7 - 3.1 x10E3/uL    ABS. MONOCYTES 0.3 0.1 - 0.9 x10E3/uL    ABS. EOSINOPHILS 0.0 0.0 - 0.4 x10E3/uL    ABS. BASOPHILS 0.0 0.0 - 0.2 x10E3/uL    IMMATURE GRANULOCYTES 0 Not Estab. %    ABS. IMM. GRANS. 0.0 0.0 - 0.1 C91B7/LB   METABOLIC PANEL, COMPREHENSIVE   Result Value Ref Range    Glucose 96 65 - 99 mg/dL    BUN 13 8 - 27 mg/dL    Creatinine 0.55 (L) 0.57 - 1.00 mg/dL    GFR est non-AA 85 >59 mL/min/1.73    GFR est AA 98 >59 mL/min/1.73    BUN/Creatinine ratio 24 12 - 28    Sodium 142 134 - 144 mmol/L    Potassium 4.4 3.5 - 5.2 mmol/L    Chloride 103 96 - 106 mmol/L    CO2 24 18 - 29 mmol/L    Calcium 10.1 8.7 - 10.3 mg/dL    Protein, total 6.7 6.0 - 8.5 g/dL    Albumin 4.4 3.5 - 4.7 g/dL    GLOBULIN, TOTAL 2.3 1.5 - 4.5 g/dL    A-G Ratio 1.9 1.2 - 2.2    Bilirubin, total <0.2 0.0 - 1.2 mg/dL    Alk.  phosphatase 64 39 - 117 IU/L    AST (SGOT) 22 0 - 40 IU/L    ALT (SGPT) 12 0 - 32 IU/L   VITAMIN D, 25 HYDROXY   Result Value Ref Range    VITAMIN D, 25-HYDROXY 15.7 (L) 30.0 - 100.0 ng/mL   AMB POC URINALYSIS DIP STICK AUTO W/O MICRO   Result Value Ref Range    Color (UA POC) Yellow     Clarity (UA POC) Clear     Glucose (UA POC) Negative Negative    Bilirubin (UA POC) Negative Negative    Ketones (UA POC) Negative Negative    Specific gravity (UA POC) 1.005 1.001 - 1.035    Blood (UA POC) Negative Negative    pH (UA POC) 5.5 4.6 - 8.0    Protein (UA POC) Negative Negative    Urobilinogen (UA POC) 0.2 mg/dL 0.2 - 1    Nitrites (UA POC) Negative Negative    Leukocyte esterase (UA POC) Negative Negative         ASSESSMENT and PLAN    ICD-10-CM ICD-9-CM    1. Gait disturbance R26.9 781.2 REFERRAL TO NEUROLOGY   2. Balance problems R26.89 781.99 REFERRAL TO NEUROLOGY   3. Visual disturbance H53.9 368.9 REFERRAL TO NEUROLOGY   4. Urinary incontinence, unspecified type R32 788.30 REFERRAL TO NEUROLOGY      AMB POC URINALYSIS DIP STICK AUTO W/O MICRO   5. Other complicated headache syndrome G44.59 339.44 REFERRAL TO NEUROLOGY   6. Vitamin D deficiency S72.6 439.2 METABOLIC PANEL, COMPREHENSIVE      VITAMIN D, 25 HYDROXY   7. Malaise and fatigue R53.81 780.79 THYROID CASCADE PROFILE    R53.83  VITAMIN B12 & FOLATE      CBC WITH AUTOMATED DIFF   8. Abnormality of gait and mobility  R26.9 781.2 VITAMIN B12 & FOLATE     86F who is concerned about recent onset of malaise, balance problems, visual disturbance, incontinence and abnormal gait that has progressed over the past few weeks. She also has had headaches. Given the constellation of symptoms, she needs to have follow up with neurology. I did a UA to make sure no infections and this was negative. UA was negative and before closing her notes, I did receive her labs and the only concern was a low vitamin D level. I have discussed the diagnosis with the patient and the intended treatment plan as seen in the above orders. The patient has received an after-visit summary and questions were answered concerning future plans.  Asked to return should symptoms worsen or not improve with treatment. Any pending labs and studies will be relayed to patient when they become available. Pt verbalizes understanding of plan of care and denies further questions or concerns at this time. Follow-up Disposition:  Return if symptoms worsen or fail to improve.

## 2018-02-16 ENCOUNTER — TELEPHONE (OUTPATIENT)
Dept: FAMILY MEDICINE CLINIC | Age: 83
End: 2018-02-16

## 2018-02-16 NOTE — TELEPHONE ENCOUNTER
The pt states she received a reminder call to make an appt to see Wilder David but she wanted to let Kimmy Bonilla know she sees Verle Cushing in march and would like to make an appt to follow up after that appt.

## 2018-03-19 ENCOUNTER — OFFICE VISIT (OUTPATIENT)
Dept: NEUROLOGY | Age: 83
End: 2018-03-19

## 2018-03-19 VITALS
WEIGHT: 159.8 LBS | TEMPERATURE: 98.5 F | BODY MASS INDEX: 25.68 KG/M2 | HEIGHT: 66 IN | OXYGEN SATURATION: 98 % | DIASTOLIC BLOOD PRESSURE: 98 MMHG | HEART RATE: 98 BPM | SYSTOLIC BLOOD PRESSURE: 168 MMHG | RESPIRATION RATE: 16 BRPM

## 2018-03-19 DIAGNOSIS — R41.3 MEMORY CHANGE: ICD-10-CM

## 2018-03-19 DIAGNOSIS — R26.9 GAIT DIFFICULTY: Primary | ICD-10-CM

## 2018-03-19 NOTE — PROGRESS NOTES
Chief Complaint   Patient presents with    Follow-up     Patient presents today for a follow up visit to discuss results from labs, MRI & EMG.        Reviewed record in preparation for visit and have necessary documentation  Pt did not bring medication to office visit for review

## 2018-03-19 NOTE — PATIENT INSTRUCTIONS
Patient history reviewed and patient examined. Went over results of testing as to patients memory and gait dysfunction. 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 Will suggest catching up in the month of August when the formal memory testing is completed. The brain MRI had a casual suggestion of potential normal pressure hydrocephalus but I respect the patient's decision not to follow-up on this through neurosurgery.

## 2018-03-19 NOTE — PROGRESS NOTES
Neurology Consult      Subjective:      La Nena Brito is a 80 y.o. female who comes in for initial results on testing of gait and memory performance. 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 Had a random blood sugar 105 but the glucose tolerance test was normal.  The MRI of the lumbosacral spine disclosed normal cord signal but had L2-L3 severe canal stenosis and moderate left neural foraminal encroachment. At L3-L4 there was severe canal stenosis and mild bilateral neural foraminal encroachment. At L4-5 there was severe canal stenosis and L NF encroachment. .  There was severe canal stenosis and severe left and moderate right neural foraminal encroachment L5-S1. Brain MRI suggested age related atrophy and white matter changes but I thought the ventricles were enlarged compared to very subtle age-related atrophy and brings up the question of NPH? The neuropsychological testing is due to take place in July. Patient says she has baseline right knee arthritic changes that give her pause. Went over the idea of multi-etiology basis for her gait dysfunction. Does not want to check out the NPH possibility with neurosurgery. Similarly not interested in evaluation of the lumbosacral stenosis as mentioned above. The daughter brought up the notion of driving and I suggested the safe course would be through an acceptable evaluation on the road test with [facility] and Occupational Therapy at [facility] offers a formal road evaluation. Obviously the neuropsych testing results could be of importance as memory issues can greatly enhance gait dysfunction. Will check up with her in August hoping for completion of the memory testing in July. Current Outpatient Prescriptions   Medication Sig Dispense Refill    ASPIRIN/SALICYLAMIDE/CAFFEINE (BC HEADACHE POWDER PO) Take  by mouth.      Iman Simons FOLIC tab capsule TAKE ONE TABLET BY MOUTH ONCE DAILY FOR  90  DAYS 90 Tab 1    lisinopril (PRINIVIL, ZESTRIL) 10 mg tablet TAKE ONE TABLET BY MOUTH ONCE DAILY 30 Tab 5      Allergies   Allergen Reactions    Hyzaar [Losartan-Hydrochlorothiazide] Vertigo    Pseudoephedrine Other (comments)     Rapid heart rate.  Naprosyn [Naproxen] Not Reported This Time     Past Medical History:   Diagnosis Date    Anemia     Sees Dr. Isatu Dumas and is on a prescription multivitamin call Multigen which has corrected her anemia    Headache     Hypertension     Intracranial hypotension 1998    had blood patch to correct    Meningitis 1950 & 2011    Osteochondritis 1975    Snoring       Past Surgical History:   Procedure Laterality Date    HX CATARACT REMOVAL  1997    HX DILATION AND CURETTAGE  1972    HX OTHER SURGICAL  1975    osteochroditis    HX OTHER SURGICAL  1998    blood patch for intercranial spontaneious hypotension       Social History     Social History    Marital status:      Spouse name: N/A    Number of children: N/A    Years of education: N/A     Occupational History    Not on file.      Social History Main Topics    Smoking status: Former Smoker    Smokeless tobacco: Never Used    Alcohol use 0.6 oz/week     0 Standard drinks or equivalent, 1 Glasses of wine per week    Drug use: No    Sexual activity: No     Other Topics Concern    Not on file     Social History Narrative      Family History   Problem Relation Age of Onset    Lung Disease Brother      lung cancer    Diabetes Mother     Thyroid Disease Mother      goiter    Diabetes Sister     Heart Disease Sister     Heart Disease Father     Stroke Father     Hypertension Brother     Stroke Brother     Cancer Brother     Parkinson's Disease Sister     Other Sister      brain aneurysm    Alzheimer Sister       Visit Vitals    BP (!) 168/98 (BP 1 Location: Left arm, BP Patient Position: Sitting)    Pulse 98    Temp 98.5 °F (36.9 °C) (Oral)    Resp 16    Ht 5' 6\" (1.676 m)    Wt 72.5 kg (159 lb 12.8 oz)    SpO2 98%    BMI 25.79 kg/m2        Review of Systems:   A comprehensive review of systems was negative except for that written in the HPI. Neuro Exam:     Appearance: The patient is well developed, well nourished, provides a coherent history and is in no acute distress. Mental Status: Oriented to time, place and person. Mood and affect appropriate. Cranial Nerves:   Intact visual fields. Fundi are benign. JORGE, EOM's full, no nystagmus, no ptosis. Facial sensation is normal. Corneal reflexes are intact. Facial movement is symmetric. Hearing is normal bilaterally. Palate is midline with normal sternocleidomastoid and trapezius muscles are normal. Tongue is midline. Motor:  5/5 strength in upper and lower proximal and distal muscles. Normal bulk and tone. No fasciculations. Reflexes:   Deep tendon reflexes 1-2+/4 and symmetrical.   Sensory:    Diminished distally to touch, pinprick and vibration. Gait:  as before transfers and gait are taken slowly and cautiously short spaced with her eyes firmly fixed to the floor step to step. .   Tremor:   No tremor noted. Cerebellar:  No cerebellar signs present. Neurovascular:  Normal heart sounds and regular rhythm, peripheral pulses intact, and no carotid bruits. Assessment:   Gait dysfunction. Went over the possibilities as in progress notes. Includes sensory neuropathy of undisclosed etiology, lumbosacral stenosis arthritic changes and yet to be completed memory evaluation score. Brain MRI headache suggestion of potential NPH for consideration but the patient is not interested in formal evaluation through neurosurgery at [facility].   The memory testing will be completed in July so suggest a revisit timeframe in August.  If she is interested in formal driving evaluation that could come through the Occupational Therapy Department at [facility] or a formal road driving test to [facility].         Plan:   Revisit in August.  Signed by :  Liv Benson MD

## 2018-03-19 NOTE — MR AVS SNAPSHOT
[facility] 
 
 
 [place] [address] [place] [employer] [place] [address] [phone] Patient: Dana Forrester MRN: [id number] [facility]:0/75/0346 Visit Information Date & Time Provider Department Dept. Phone Encounter #  
 3/19/2018  3:40 PM Hieu Hatfield MD [facility] Neurology [facility] [phone] [id number] Your Appointments 7/25/2018 10:00 AM  
New Patient with Valerie Rainey PsyD [address] ([address]) Appt Note: New Patient Memory Loss  $0CP  arsh  1/22/18 [place] [address] [place] [employer] [place] [address] [phone]  
  
   
 López Sophiaside  
  
    
 8/27/2018  3:00 PM  
Follow Up with Hieu Hatfield MD  
[facility]) Appt Note: follow up [place] [address] [place] [employer] [address] [phone]  
  
   
 [place] [address] [id number] [address] Upcoming Health Maintenance Date Due DTaP/Tdap/Td series (1 - Tdap) 3/30/1952 GLAUCOMA SCREENING Q2Y 3/30/1996 Bone Densitometry (Dexa) Screening 3/30/1996 Pneumococcal 65+ Low/Medium Risk (2 of 2 - PCV13) 9/1/2015 MEDICARE YEARLY EXAM 4/5/2018 Allergies as of 3/19/2018  Review Complete On: 3/19/2018 By: Denia Patino LPN Severity Noted Reaction Type Reactions Hyzaar [Losartan-hydrochlorothiazide] High 05/06/2016    Vertigo Pseudoephedrine High 03/14/2012   Systemic Other (comments) Rapid heart rate. Naprosyn [Naproxen]  01/22/2018    Not Reported This Time Current Immunizations  Reviewed on 4/4/2017 Name Date Influenza High Dose Vaccine PF 11/14/2017, 9/15/2015 Pneumococcal Polysaccharide (PPSV-23) 9/1/2014 Not reviewed this visit Vitals BP Pulse Temp Resp Height(growth percentile) Weight(growth percentile) (!) 168/98 (BP 1 Location: Left arm, BP Patient Position: Sitting) 98 98.5 °F (36.9 °C) (Oral) 16 5' 6\" (1.676 m) 159 lb 12.8 oz (72.5 kg) SpO2 BMI OB Status Smoking Status 98% 25.79 kg/m2 Postmenopausal Former Smoker Vitals History BMI and BSA Data Body Mass Index Body Surface Area 25.79 kg/m 2 1.84 m 2 Preferred Pharmacy Pharmacy Name Phone [address] [phone] Your Updated Medication List  
  
   
This list is accurate as of 3/19/18  4:06 PM.  Always use your most recent med list.  
  
  
  
  
 BC HEADACHE POWDER PO Take  by mouth.  
  
 lisinopril 10 mg tablet Commonly known as:  PRINIVIL, ZESTRIL  
TAKE ONE TABLET BY MOUTH ONCE DAILY MULTIGEN FOLIC Tab capsule Generic drug:  Iron AspGly-VitC-B12-FA-[id number]-Suc  
TAKE ONE TABLET BY MOUTH ONCE DAILY FOR  90  DAYS Introducing [facility]! Valarie Painting introduces LLUSTRE patient portal. Now you can access parts of your medical record, email your doctor's office, and request medication refills online. 1. In your internet browser, go to [email or web address] 2. Click on the First Time User? Click Here link in the Sign In box. You will see the New Member Sign Up page. 3. Enter your LLUSTRE Access Code exactly as it appears below. You will not need to use this code after youve completed the sign-up process. If you do not sign up before the expiration date, you must request a new code. · LLUSTRE Access Code: [facility] Expires: 4/16/2018  5:19 PM 
 
4. Enter the last four digits of your Social Security Number (xxxx) and Date of Birth (mm/dd/yyyy) as indicated and click Submit. You will be taken to the next sign-up page. 5. Create a LLUSTRE ID.  This will be your LLUSTRE login ID and cannot be changed, so think of one that is secure and easy to remember. 6. Create a RideApart password. You can change your password at any time. 7. Enter your Password Reset Question and Answer. This can be used at a later time if you forget your password. 8. Enter your e-mail address. You will receive e-mail notification when new information is available in [address] 9. Click Sign Up. You can now view and download portions of your medical record. 10. Click the Download Summary menu link to download a portable copy of your medical information. If you have questions, please visit the Frequently Asked Questions section of the RideApart website. Remember, RideApart is NOT to be used for urgent needs. For medical emergencies, dial 911. Now available from your iPhone and Android! Please provide this summary of care documentation to your next provider. Your primary care clinician is listed as [address]. If you have any questions after today's visit, please call [phone].

## 2018-03-20 DIAGNOSIS — I10 ESSENTIAL HYPERTENSION WITH GOAL BLOOD PRESSURE LESS THAN 140/90: ICD-10-CM

## 2018-03-20 RX ORDER — LISINOPRIL 10 MG/1
TABLET ORAL
Qty: 90 TAB | Refills: 1 | Status: SHIPPED | OUTPATIENT
Start: 2018-03-20 | End: 2019-02-28 | Stop reason: SDUPTHER

## 2018-03-20 NOTE — TELEPHONE ENCOUNTER
Patient called requesting a call back. Patient states she had a follow up visit w/ Dr Suellen Gomes yesterday and is following up again w/ bhaskar in July. Patient states in the meantime she is requesting a refill of medication. She wants to know does she need to follow up with Dr Karmen Bui or can she wait.     Best contact: [phone]

## 2018-07-10 RX ORDER — IRON ASPGLY/C/B12/FA/CA-TH/SUC 70-150-1MG
TABLET ORAL
Qty: 90 TAB | Refills: 1 | Status: SHIPPED | OUTPATIENT
Start: 2018-07-10 | End: 2019-02-28 | Stop reason: SDUPTHER

## 2018-07-12 ENCOUNTER — OFFICE VISIT (OUTPATIENT)
Dept: FAMILY MEDICINE CLINIC | Age: 83
End: 2018-07-12

## 2018-07-12 VITALS
TEMPERATURE: 98.4 F | DIASTOLIC BLOOD PRESSURE: 80 MMHG | SYSTOLIC BLOOD PRESSURE: 170 MMHG | HEIGHT: 66 IN | HEART RATE: 96 BPM | RESPIRATION RATE: 20 BRPM | WEIGHT: 158 LBS | BODY MASS INDEX: 25.39 KG/M2 | OXYGEN SATURATION: 95 %

## 2018-07-12 DIAGNOSIS — Z00.00 MEDICARE ANNUAL WELLNESS VISIT, SUBSEQUENT: Primary | ICD-10-CM

## 2018-07-12 DIAGNOSIS — Z13.820 SCREENING FOR OSTEOPOROSIS: ICD-10-CM

## 2018-07-12 DIAGNOSIS — I10 ESSENTIAL HYPERTENSION: ICD-10-CM

## 2018-07-12 DIAGNOSIS — Z78.0 POST-MENOPAUSE: ICD-10-CM

## 2018-07-12 NOTE — PROGRESS NOTES
This is the Subsequent Medicare Annual Wellness Exam, performed 12 months or more after the Initial AWV or the last Subsequent AWV    I have reviewed the patient's medical history in detail and updated the computerized patient record. History     87F who presents today for AWV. I have reviewed most recent visit with neurology. There was some thought about possible NPH and she was told that she could pursue work up, but she is not inclined to do any of that right now. She feels like she does not want to pursue and major medical work up or studies to pin down the diagnosis. SHe is stable and while she continues to have some symptoms, they are tolerable. Past Medical History:   Diagnosis Date    Anemia     Sees Dr. Sriram Rojas and is on a prescription multivitamin call Multigen which has corrected her anemia    Headache     Hypertension     Intracranial hypotension 1998    had blood patch to correct    Meningitis 1950 & 2011    Osteochondritis 1975    Snoring       Past Surgical History:   Procedure Laterality Date    HX CATARACT REMOVAL  1997    HX DILATION AND CURETTAGE  1972    HX OTHER SURGICAL  1975    osteochroditis    [facility]    blood patch for intercranial spontaneious hypotension      Current Outpatient Prescriptions   Medication Sig Dispense Refill    MULTIGEN FOLIC tab capsule TAKE ONE TABLET BY MOUTH ONCE DAILY 90 Tab 1    lisinopril (PRINIVIL, ZESTRIL) 10 mg tablet TAKE ONE TABLET BY MOUTH ONCE DAILY 90 Tab 1    ASPIRIN/SALICYLAMIDE/CAFFEINE (BC HEADACHE POWDER PO) Take  by mouth. Allergies   Allergen Reactions    Hyzaar [Losartan-Hydrochlorothiazide] Vertigo    Pseudoephedrine Other (comments)     Rapid heart rate.      Naprosyn [Naproxen] Not Reported This Time     Family History   Problem Relation Age of Onset    Lung Disease Brother      lung cancer    Diabetes Mother     Thyroid Disease Mother      goiter    Diabetes Sister     Heart Disease Sister     Heart Disease Father     Stroke Father     Hypertension Brother     Stroke Brother     Cancer Brother     Parkinson's Disease Sister     Other Sister      brain aneurysm    Alzheimer Sister      Social History   Substance Use Topics    Smoking status: Former Smoker    Smokeless tobacco: Never Used    Alcohol use 0.6 oz/week     0 Standard drinks or equivalent, 1 Glasses of wine per week     Patient Active Problem List   Diagnosis Code    Essential hypertension with goal blood pressure less than 140/90 I10    Absolute anemia D64.9    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 Functional Ability and Level of Safety:   Hearing Loss  Hearing is good. Activities of Daily Living  The home contains: no safety equipment. Patient does total self care    Fall Risk  Fall Risk Assessment, last 12 mths 7/12/2018   Able to walk? Yes   Fall in past 12 months?  No   Number of falls in past 12 months -       Abuse Screen  Patient is not abused    Cognitive Screening   Evaluation of Cognitive Function:  Has your family/caregiver stated any concerns about your memory: no  Normal    Patient Care Team   Patient Care Team:  Meghan Luna MD as PCP - General (Internal Medicine)    Assessment/Plan   Education and counseling provided:  Are appropriate based on today's review and evaluation  End-of-Life planning (with patient's consent)  Pneumococcal Vaccine  Bone mass measurement (DEXA)  Screening for glaucoma        Health Maintenance Due   Topic Date Due    DTaP/Tdap/Td series (1 - Tdap) Discussed    GLAUCOMA SCREENING Q2Y  Discussed    Bone Densitometry (Dexa) Screening  Discussed    Pneumococcal 65+ Low/Medium Risk (2 of 2 - PCV13) Discussed       ICD-10-CM ICD-9-CM    1. Medicare annual wellness visit, subsequent Z00.00 V70.0 Anticipatory guidance discussed. Immunizations reviewed  HM updated. 2. Post-menopause Z78.0 V49.81 DEXA BONE DENSITY STUDY AXIAL   3. Screening for osteoporosis Z13.820 V82.81 DEXA BONE DENSITY STUDY AXIAL     I have discussed the diagnosis with the patient and the intended treatment plan as seen in the above orders. The patient has received an after-visit summary and questions were answered concerning future plans. Asked to return should symptoms worsen or not improve with treatment. Any pending labs and studies will be relayed to patient when they become available. Pt verbalizes understanding of plan of care and denies further questions or concerns at this time. Follow-up Disposition:  Return in about 1 year (around 7/12/2019), or if symptoms worsen or fail to improve, for for follow up every 4 months.  ..

## 2018-07-12 NOTE — MR AVS SNAPSHOT
[facility] 
 
 
 [address] [facility] 
[phone] Patient: Adrian Trujillo MRN: [id number] Visit Information Date & Time Provider Department Dept. Phone Encounter #  
 7/12/2018  3:45 PM Tyler Kim Vincenzo [phone] [id number] Follow-up Instructions Return in about 1 year (around 7/12/2019), or if symptoms worsen or fail to improve, for for follow up every 4 months. .  
  
Your Appointments 7/25/2018 10:00 AM  
New Patient with Teja Porras PsyD [facility] ([address]) Appt Note: New Patient Memory Loss  $0CP  arsh  1/22/18 [place] [address] [address] [phone]  
  
   
 [place] [address] [id number] [address] 8/31/2018 10:40 AM  
Follow Up with Austin Mcallister MD  
[facility]) Appt Note: follow up; f/u results [facility] 03/19/18 [place] [address] [id number] [phone]  
  
   
 [place] [address] [id number] [address] Upcoming Health Maintenance Date Due DTaP/Tdap/Td series (1 - Tdap) 3/30/1952 GLAUCOMA SCREENING Q2Y 3/30/1996 Bone Densitometry (Dexa) Screening 3/30/1996 Pneumococcal 65+ Low/Medium Risk (2 of 2 - PCV13) 9/1/2015 Influenza Age 5 to Adult 8/1/2018 MEDICARE YEARLY EXAM 7/13/2019 Allergies as of 7/12/2018  Review Complete On: 7/12/2018 By: Bree Jacome MD  
  
 Severity Noted Reaction Type Reactions Hyzaar [Losartan-hydrochlorothiazide] High 05/06/2016    Vertigo Pseudoephedrine High 03/14/2012   Systemic Other (comments) Rapid heart rate. Naprosyn [Naproxen]  01/22/2018    Not Reported This Time Current Immunizations  Reviewed on 4/4/2017 Name Date Influenza High Dose Vaccine PF 11/14/2017, 9/15/2015 Pneumococcal Polysaccharide (PPSV-23) 9/1/2014 Not reviewed this visit You Were Diagnosed With   
  
 Codes Comments Medicare annual wellness visit, subsequent    -  Primary ICD-10-CM: Z00.00 ICD-9-CM: V70.0 Post-menopause     ICD-10-CM: Z78.0 ICD-9-CM: V49.81 Screening for osteoporosis     ICD-10-CM: Z13.820 ICD-9-CM: V82.81 Vitals BP Pulse Temp Resp Height(growth percentile) Weight(growth percentile) 170/80 (BP 1 Location: Left arm, BP Patient Position: Sitting) 96 98.4 °F (36.9 °C) (Oral) 20 5' 6\" (1.676 m) 158 lb (71.7 kg) SpO2 BMI OB Status Smoking Status 95% 25.5 kg/m2 Postmenopausal Former Smoker BMI and BSA Data Body Mass Index Body Surface Area 25.5 kg/m 2 1.83 m 2 Preferred Pharmacy Pharmacy Name Phone [address] [place] [address] [phone] Your Updated Medication List  
  
   
This list is accurate as of 7/12/18  4:59 PM.  Always use your most recent med list.  
  
  
  
  
 BC HEADACHE POWDER PO Take  by mouth.  
  
 lisinopril 10 mg tablet Commonly known as:  PRINIVIL, ZESTRIL  
TAKE ONE TABLET BY MOUTH ONCE DAILY MULTIGEN FOLIC Tab capsule Generic drug:  Iron [id number]  
TAKE ONE TABLET BY MOUTH ONCE DAILY Follow-up Instructions Return in about 1 year (around 7/12/2019), or if symptoms worsen or fail to improve, for for follow up every 4 months. .  
  
To-Do List   
 07/19/2018 Imaging:  DEXA BONE DENSITY STUDY AXIAL Patient Instructions Well Visit, Over 72: Care Instructions Your Care Instructions Physical exams can help you stay healthy. Your doctor has checked your overall health and may have suggested ways to take good care of yourself. He or she also may have recommended tests.  At home, you can help prevent illness with healthy eating, regular exercise, and other steps. Follow-up care is a key part of your treatment and safety. Be sure to make and go to all appointments, and call your doctor if you are having problems. It's also a good idea to know your test results and keep a list of the medicines you take. How can you care for yourself at home? · Reach and stay at a healthy weight. This will lower your risk for many problems, such as obesity, diabetes, heart disease, and high blood pressure. · Get at least 30 minutes of exercise on most days of the week. Walking is a good choice. You also may want to do other activities, such as running, swimming, cycling, or playing tennis or team sports. · Do not smoke. Smoking can make health problems worse. If you need help quitting, talk to your doctor about stop-smoking programs and medicines. These can increase your chances of quitting for good. · Protect your skin from too much sun. When you're outdoors from 10 a.m. to 4 p.m., stay in the shade or cover up with clothing and a hat with a wide brim. Wear sunglasses that block UV rays. Even when it's cloudy, put broad-spectrum sunscreen (SPF 30 or higher) on any exposed skin. · See a dentist one or two times a year for checkups and to have your teeth cleaned. · Wear a seat belt in the car. · Limit alcohol to 2 drinks a day for men and 1 drink a day for women. Too much alcohol can cause health problems. Follow your doctor's advice about when to have certain tests. These tests can spot problems early. For men and women · Cholesterol. Your doctor will tell you how often to have this done based on your overall health and other things that can increase your risk for heart attack and stroke. · Blood pressure. Have your blood pressure checked during a routine doctor visit. Your doctor will tell you how often to check your blood pressure based on your age, your blood pressure results, and other factors. · Diabetes. Ask your doctor whether you should have tests for diabetes. · Vision. Experts recommend that you have yearly exams for glaucoma and other age-related eye problems. · Hearing. Tell your doctor if you notice any change in your hearing. You can have tests to find out how well you hear. · Colon cancer tests. Keep having colon cancer tests as your doctor recommends. You can have one of several types of tests. · Heart attack and stroke risk. At least every 4 to 6 years, you should have your risk for heart attack and stroke assessed. Your doctor uses factors such as your age, blood pressure, cholesterol, and whether you smoke or have diabetes to show what your risk for a heart attack or stroke is over the next 10 years. · Osteoporosis. Talk to your doctor about whether you should have a bone density test to find out whether you have thinning bones. Also ask your doctor about whether you should take calcium and vitamin D supplements. For women · Pap test and pelvic exam. You may no longer need a Pap test. Talk with your doctor about whether to stop or continue to have Pap tests. · Breast exam and mammogram. Ask how often you should have a mammogram, which is an X-ray of your breasts. A mammogram can spot breast cancer before it can be felt and when it is easiest to treat. · Thyroid disease. Talk to your doctor about whether to have your thyroid checked as part of a regular physical exam. Women have an increased chance of a thyroid problem. For men · Prostate exam. Talk to your doctor about whether you should have a blood test (called a PSA test) for prostate cancer. Experts disagree on whether men should have this test. Some experts recommend that you discuss the benefits and risks of the test with your doctor. · Abdominal aortic aneurysm. Ask your doctor whether you should have a test to check for an aneurysm.  You may need a test if you ever smoked or if your parent, brother, sister, or child has had an aneurysm. When should you call for help? Watch closely for changes in your health, and be sure to contact your doctor if you have any problems or symptoms that concern you. Where can you learn more? Go to http://katrin-connie.info/. Enter Y807 in the search box to learn more about \"Well Visit, Over 65: Care Instructions. \" Current as of: May 16, 2017 Content Version: 11.7 © 3335-9230 Healthwise, Incorporated. Care instructions adapted under license by [employer] (which disclaims liability or warranty for this information). If you have questions about a medical condition or this instruction, always ask your healthcare professional. [address] any warranty or liability for your use of this information. Introducing [facility]! Natali Brown introduces eRepublik patient portal. Now you can access parts of your medical record, email your doctor's office, and request medication refills online. 1. In your internet browser, go to [email or web address] 2. Click on the First Time User? Click Here link in the Sign In box. You will see the New Member Sign Up page. 3. Enter your eRepublik Access Code exactly as it appears below. You will not need to use this code after youve completed the sign-up process. If you do not sign up before the expiration date, you must request a new code. · eRepublik Access Code: [email or web address] Expires: 10/10/2018 11:46 AM 
 
4. Enter the last four digits of your Social Security Number (xxxx) and Date of Birth (mm/dd/yyyy) as indicated and click Submit. You will be taken to the next sign-up page. 5. Create a Mojo Mobilityt ID. This will be your eRepublik login ID and cannot be changed, so think of one that is secure and easy to remember. 6. Create a Mojo Mobilityt password. You can change your password at any time. 7. Enter your Password Reset Question and Answer. This can be used at a later time if you forget your password. 8. Enter your e-mail address. You will receive e-mail notification when new information is available in [address] 9. Click Sign Up. You can now view and download portions of your medical record. 10. Click the Download Summary menu link to download a portable copy of your medical information. If you have questions, please visit the Frequently Asked Questions section of the Ansira website. Remember, Ansira is NOT to be used for urgent needs. For medical emergencies, dial 911. Now available from your iPhone and Android! Please provide this summary of care documentation to your next provider. Your primary care clinician is listed as [address]. If you have any questions after today's visit, please call [phone].

## 2018-07-12 NOTE — PROGRESS NOTES
Identified pt with two pt identifiers(name and ). Chief Complaint   Patient presents with    Follow Up Chronic Condition     Follow up s/p polyneuropathy and hydrocephalis diagnosis with neurologist        Health Maintenance Due   Topic    DTaP/Tdap/Td series (1 - Tdap)    GLAUCOMA SCREENING Q2Y     Bone Densitometry (Dexa) Screening     Pneumococcal 65+ Low/Medium Risk (2 of 2 - PCV13)    MEDICARE YEARLY EXAM        Wt Readings from Last 3 Encounters:   18 159 lb 12.8 oz (72.5 kg)   18 155 lb 14.4 oz (70.7 kg)   18 161 lb (73 kg)     Temp Readings from Last 3 Encounters:   18 98.5 °F (36.9 °C) (Oral)   18 98.4 °F (36.9 °C) (Oral)   18 98.1 °F (36.7 °C) (Temporal)     BP Readings from Last 3 Encounters:   18 (!) 168/98   18 (!) 166/94   18 162/84     Pulse Readings from Last 3 Encounters:   18 98   18 81   18 (!) 103         Learning Assessment:  :     Learning Assessment 2016   PRIMARY LEARNER Patient Patient   HIGHEST LEVEL OF EDUCATION - PRIMARY LEARNER  - GRADUATED HIGH SCHOOL OR GED   BARRIERS PRIMARY LEARNER - NONE   CO-LEARNER CAREGIVER - No   PRIMARY LANGUAGE ENGLISH ENGLISH   LEARNER PREFERENCE PRIMARY DEMONSTRATION DEMONSTRATION     - LISTENING     - READING   ANSWERED BY patient  patient   RELATIONSHIP SELF SELF       Depression Screening:  :     PHQ over the last two weeks 2018   Little interest or pleasure in doing things Not at all   Feeling down, depressed or hopeless Not at all   Total Score PHQ 2 0       Fall Risk Assessment:  :     Fall Risk Assessment, last 12 mths 2018   Able to walk? Yes   Fall in past 12 months? No   Number of falls in past 12 months -       Abuse Screening:  :     Abuse Screening Questionnaire 2017   Do you ever feel afraid of your partner? N   Are you in a relationship with someone who physically or mentally threatens you? N   Is it safe for you to go home?  Anthony Bermudez Coordination of Care Questionnaire:  :     1) Have you been to an emergency room, urgent care clinic since your last visit? no   Hospitalized since your last visit? no             2) Have you seen or consulted any other health care providers outside of [address] [place] [address] since your last visit? yes Dr. Loraine Enriquez  (Include any pap smears or colon screenings in this section.)    3) Do you have an Advance Directive on file? no  Are you interested in receiving information about Advance Directives? no    Reviewed record in preparation for visit and have obtained necessary documentation. Medication reconciliation up to date and corrected with patient at this time.

## 2018-07-12 NOTE — PATIENT INSTRUCTIONS

## 2018-07-12 NOTE — PROGRESS NOTES
Subjective:   87F who presents today for follow up after recent evaluation with neurology. She was being evaluated for balance issues. She also has polyneuropathy and question of NPH. She was given some options and she reports that she did not see the benefits of doing any of those things because she did not feel that they would help or improve her situation. She cancelled her memory testing and will not go to Occupational Therapy or have formal driving evaluation. She reports that she is rarely driving. She did drive herself to the appointment today. She feels like she does not want to have any more increased medical evaluations. The only thing that seems to be bothering her is the [facility] feeling\" in her legs. They feel like \"they are nor my legs. \" She does not recall talking to Dr. Estela Luu about her leg findings. She will be following up with him in 1-month. Patient Active Problem List    Diagnosis Date Noted    Neuropathy 01/22/2018    Absolute anemia 04/04/2017    Tear film insufficiency 11/08/2016    Systemic elastorrhexis 11/07/2016    Excess skin of eyelid 11/07/2016    Convergent squint 11/07/2016    Pseudophakia 11/07/2016    Essential hypertension with goal blood pressure less than 140/90 06/03/2016     Current Outpatient Prescriptions   Medication Sig Dispense Refill    MULTIGEN FOLIC tab capsule TAKE ONE TABLET BY MOUTH ONCE DAILY 90 Tab 1    lisinopril (PRINIVIL, ZESTRIL) 10 mg tablet TAKE ONE TABLET BY MOUTH ONCE DAILY 90 Tab 1    ASPIRIN/SALICYLAMIDE/CAFFEINE (BC HEADACHE POWDER PO) Take  by mouth. Allergies   Allergen Reactions    Hyzaar [Losartan-Hydrochlorothiazide] Vertigo    Pseudoephedrine Other (comments)     Rapid heart rate.      Naprosyn [Naproxen] Not Reported This Time     Past Medical History:   Diagnosis Date    Anemia     Sees Dr. Juanito Homans and is on a prescription multivitamin call Multigen which has corrected her anemia    Headache     Hypertension     Intracranial hypotension 1998    had blood patch to correct    Meningitis 1950 & 2011    Osteochondritis 1975    Snoring      Past Surgical History:   Procedure Laterality Date    HX CATARACT REMOVAL  1997    HX DILATION AND CURETTAGE  1972    HX OTHER SURGICAL  1975    osteochroditis    HX OTHER SURGICAL  1998    blood patch for intercranial spontaneious hypotension      Family History   Problem Relation Age of Onset    Lung Disease Brother      lung cancer    Diabetes Mother     Thyroid Disease Mother      goiter    Diabetes Sister     Heart Disease Sister     Heart Disease Father     Stroke Father     Hypertension Brother     Stroke Brother     Cancer Brother     Parkinson's Disease Sister     Other Sister      brain aneurysm    Alzheimer Sister      Social History   Substance Use Topics    Smoking status: Former Smoker    Smokeless tobacco: Never Used    Alcohol use 0.6 oz/week     0 Standard drinks or equivalent, 1 Glasses of wine per week      Review of Systems  Pertinent items are noted in HPI. Objective:        Visit Vitals    /80 (BP 1 Location: Left arm, BP Patient Position: Sitting)  Comment: Manual    Pulse 96    Temp 98.4 °F (36.9 °C) (Oral)    Resp 20    Ht 5' 6\" (1.676 m)    Wt 158 lb (71.7 kg)    SpO2 95%    BMI 25.5 kg/m2     General:  Alert, cooperative, no distress, appears stated age. Head:  Normocephalic, without obvious abnormality, atraumatic. Eyes:  Conjunctivae/corneas clear. PERRL, EOMs intact. Fundi benign. Ears:  Normal TMs and external ear canals both ears. Nose: Nares normal. Septum midline. Mucosa normal. No drainage or sinus tenderness. Throat: Lips, mucosa, and tongue normal. Teeth and gums normal.   Neck: Supple, symmetrical, trachea midline, no adenopathy, thyroid: no enlargement/tenderness/nodules, no carotid bruit and no JVD. Back:   Symmetric, no curvature. ROM normal. No CVA tenderness.    Lungs:   Clear to auscultation bilaterally. Heart:  Regular rate and rhythm, S1, S2 normal, no murmur, click, rub or gallop. Extremities: Extremities normal, atraumatic, no cyanosis or edema. Pulses: 2+ and symmetric all extremities. Skin: Skin color, texture, turgor normal. No rashes or lesions. Lymph nodes: Cervical, supraclavicular, and axillary nodes normal.   Neurologic: CNII-XII intact. Normal strength, sensation and reflexes throughout. Assessment/Plan:       ICD-10-CM ICD-9-CM    1. Medicare annual wellness visit, subsequent Z00.00 V70.0    2. Post-menopause Z78.0 V49.81 DEXA BONE DENSITY STUDY AXIAL   3. Screening for osteoporosis Z13.820 V82.81 DEXA BONE DENSITY STUDY AXIAL   4. Essential hypertension I10 401.9 No change to medications at this time. She reports that at home, it is [facility]. Will continue to monitor. Alice Lynch has been given the following recommendations today due to her elevated BP reading: rescreen BP within a minimum of 1-3 months months and lifestyle modifications to include: DASH eating plan, dietary sodium restriction and increase physical activity. I have discussed the diagnosis with the patient and the intended treatment plan as seen in the above orders. The patient has received an after-visit summary and questions were answered concerning future plans. Asked to return should symptoms worsen or not improve with treatment. Any pending labs and studies will be relayed to patient when they become available. Pt verbalizes understanding of plan of care and denies further questions or concerns at this time. Follow-up Disposition:  Return in about 1 year (around 7/12/2019), or if symptoms worsen or fail to improve, for for follow up every 4 months.

## 2018-07-20 ENCOUNTER — HOSPITAL ENCOUNTER (OUTPATIENT)
Dept: MAMMOGRAPHY | Age: 83
Discharge: HOME | End: 2018-07-20
Attending: INTERNAL MEDICINE
Payer: MEDICARE

## 2018-07-20 DIAGNOSIS — Z13.820 SCREENING FOR OSTEOPOROSIS: ICD-10-CM

## 2018-07-20 DIAGNOSIS — Z78.0 POST-MENOPAUSE: ICD-10-CM

## 2018-07-20 PROCEDURE — 77080 DXA BONE DENSITY AXIAL: CPT

## 2018-07-20 NOTE — PROGRESS NOTES
Patient found to have osteopenia on bone density. OSTEOPENIA RECOMMENDATIONS:     - Vitamin D = 7370-0777 IU/day   - Calcium = 600 mg/day   - Gentle weight bearing exercises   - Bone density screening every 2-years. A pharmacist should be able to show you the best combination of these drugs. We will discuss this more after your follow up visit.

## 2018-07-26 ENCOUNTER — TELEPHONE (OUTPATIENT)
Dept: FAMILY MEDICINE CLINIC | Age: 83
End: 2018-07-26

## 2018-07-26 NOTE — TELEPHONE ENCOUNTER
Spoke with patient via phone at 10:05 and made patient aware of Dr. Milla Shoemaker recommendations from result note. Patient voices that she does take the Vit D however; she ran out of Calcium and she's not able to get out when she would like to due to a transportation problem. Voices appreciation for the call.

## 2019-02-28 ENCOUNTER — OFFICE VISIT (OUTPATIENT)
Dept: FAMILY MEDICINE CLINIC | Age: 84
End: 2019-02-28

## 2019-02-28 VITALS
DIASTOLIC BLOOD PRESSURE: 78 MMHG | SYSTOLIC BLOOD PRESSURE: 190 MMHG | HEIGHT: 66 IN | RESPIRATION RATE: 16 BRPM | WEIGHT: 155 LBS | TEMPERATURE: 97.9 F | BODY MASS INDEX: 24.91 KG/M2 | HEART RATE: 75 BPM | OXYGEN SATURATION: 97 %

## 2019-02-28 DIAGNOSIS — I10 ESSENTIAL HYPERTENSION WITH GOAL BLOOD PRESSURE LESS THAN 140/90: Primary | ICD-10-CM

## 2019-02-28 DIAGNOSIS — R53.81 MALAISE AND FATIGUE: ICD-10-CM

## 2019-02-28 DIAGNOSIS — Z13.220 SCREENING FOR HYPERLIPIDEMIA: ICD-10-CM

## 2019-02-28 DIAGNOSIS — G62.89 OTHER POLYNEUROPATHY: ICD-10-CM

## 2019-02-28 DIAGNOSIS — G60.3 IDIOPATHIC PROGRESSIVE NEUROPATHY: ICD-10-CM

## 2019-02-28 DIAGNOSIS — R53.83 MALAISE AND FATIGUE: ICD-10-CM

## 2019-02-28 DIAGNOSIS — E55.9 VITAMIN D DEFICIENCY: ICD-10-CM

## 2019-02-28 RX ORDER — LISINOPRIL 10 MG/1
TABLET ORAL
Qty: 90 TAB | Refills: 1 | Status: SHIPPED | OUTPATIENT
Start: 2019-02-28 | End: 2019-08-29 | Stop reason: SDUPTHER

## 2019-02-28 NOTE — PROGRESS NOTES
Chief Complaint: 
Chief Complaint Patient presents with  Medication Refill  
  multigen and lisinopril refill request  
 Hypertension History of Present Illness: 
She is a 80 y.o. female who presents with her daughter for follow up. She has h/o White coat hypertension and also had been worked up with Dr. Milton Brooke (neurology) for possible NPH. He was going to refer her to a specialist, but at the time, she was not interested in anything. She still has some balance issues. No falls since her last visit. She denies worsening symptoms, but present. At times, she does have a headache. Mrs. Sally Ortega is a very high functioning well put together patient. She does not want over medicalization, but will think about seeing the specialist for further work up. Otherwise, she needs labs (which she will return for) and also needs to have refills of her medications today. Reviewed PmHx, RxHx, FmHx, SocHx, AllgHx and updated and dated in the chart. Patient Active Problem List  
 Diagnosis  Neuropathy  Absolute anemia  Tear film insufficiency  Systemic elastorrhexis  Excess skin of eyelid  Convergent squint  Pseudophakia  Essential hypertension with goal blood pressure less than 140/90 Review of Systems - negative except as listed above in the HPI Objective:  
 
Vitals:  
 02/28/19 1604 BP: 190/78 Pulse: 75 Resp: 16 Temp: 97.9 °F (36.6 °C) TempSrc: Oral  
SpO2: 97% Weight: 155 lb (70.3 kg) Height: 5' 6\" (1.676 m) Physical Examination:  
General appearance - alert, well appearing, and in no distress and normal appearing weight Mental status - alert, oriented to person, place, and time Eyes - pupils equal and reactive, extraocular eye movements intact Neck - supple, no significant adenopathy Chest - clear to auscultation, no wheezes, rales or rhonchi, symmetric air entry Heart - normal rate, regular rhythm, normal S1, S2, no murmurs, rubs, clicks or gallops Musculoskeletal - no joint tenderness, deformity or swelling Extremities - peripheral pulses normal, no pedal edema, no clubbing or cyanosis Skin - normal coloration and turgor, no rashes, no suspicious skin lesions noted Assessment/ Plan:  
Diagnoses and all orders for this visit: 1. Essential hypertension with goal blood pressure less than 140/90 
-     lisinopril (PRINIVIL, ZESTRIL) 10 mg tablet; TAKE ONE TABLET BY MOUTH ONCE DAILY 2. Screening for hyperlipidemia -     METABOLIC PANEL, COMPREHENSIVE 
-     LIPID PANEL 3. Vitamin D deficiency 
-     VITAMIN D, 25 HYDROXY 4. Malaise and fatigue 
-     CBC WITH AUTOMATED DIFF 
-     THYROID CASCADE PROFILE 
 
5. Other polyneuropathy 
-     VITAMIN B12 
 
6. Idiopathic progressive neuropathy  
-     VITAMIN B12 Other orders 
-     iron [id number] (MULTIGEN FOLIC) tab capsule; TAKE ONE TABLET BY MOUTH ONCE DAILY I have discussed the diagnosis with the patient and the intended treatment plan as seen in the above orders. The patient has received an after-visit summary and questions were answered concerning future plans. Asked to return should symptoms worsen or not improve with treatment. Any pending labs and studies will be relayed to patient when they become available. Pt verbalizes understanding of plan of care and denies further questions or concerns at this time. Follow-up Disposition: 
Return if symptoms worsen or fail to improve. Sayra Wang MD 
Patient Instructions Learning About Peripheral Neuropathy What is peripheral neuropathy? Peripheral neuropathy is a problem that affects the peripheral nerves. These nerves lead from the spinal cord to other parts of the body. They control your sense of touch, how you feel pain and temperature, and your muscle strength. Most of the time the problem starts in the fingers and toes.  As it gets worse, it moves into the limbs. It can cause pain and loss of feeling in the feet, legs, and hands. What causes it? There are several causes: · Diabetes. This is the most common cause. If your blood sugar is too high for too long, it can damage the nerves. · Kidney problems. These can lead to toxic substances in the blood that damage nerves. · Low levels of thyroid hormone (hypothyroidism). This may cause swelling of the tissues around the nerves, which can put pressure on them. · Infectious or inflammatory diseases. Examples are HIV and Guillain-Barré syndrome. These diseases can damage the nerves. · Peripheral nerve injury. A physical injury can damage the nerves. Injuries can be from things like falls, car crashes, or playing sports. · Overusing alcohol and not eating a healthy diet. These can lead to your body not having enough of certain vitamins, such as vitamin B-12. This can damage nerves. · Being exposed to toxic substances. These include lead, mercury, and certain medicines, such as those used for chemotherapy. Sometimes the cause isn't known. What are the symptoms? Symptoms of peripheral neuropathy can occur slowly over time. The most common ones are: · Numbness, tightness, and tingling, especially in the legs, hands, and feet. · Loss of feeling. · Burning, shooting, or stabbing pain in the legs, hands, and feet. Often the pain is worse at night. · Weakness and loss of balance. What can happen if you have it? If peripheral neuropathy gets worse, it can lead to a complete lack of feeling in your hands or feet. This can make you more likely to injure them. It may lead to calluses and blisters. It can also lead to bone and joint problems, infection, and ulcers. For instance, small, repeated injuries to the foot may lead to bigger problems. This can happen because you can't feel the injuries. Reduced feeling in the feet can also change your step, leading to bone or joint problems. If untreated, foot problems can become so severe that the foot or lower leg may have to be amputated. But treatment can slow down peripheral neuropathy. And it's a good idea to take care to avoid injury. How is it diagnosed? To diagnose peripheral neuropathy, your doctor will ask you about: 
· Your symptoms. · Your medical history. This may include your use of alcohol, risk of HIV infection, or exposure to toxic substances. · Your family's medical history, including nerve disease. Your doctor may test how well you can feel touch, temperature, and pain. You may also have blood tests. These tests will help the doctor find out if you have conditions that can cause neuropathy. Examples are diabetes, vitamin deficiencies, thyroid disease, and kidney problems. How is it treated? Treatment for peripheral neuropathy can relieve symptoms. This is done by treating the health problem that's causing it. For example, if you have diabetes, keeping your blood sugar within your target range may help. Or maybe your body lacks certain vitamins caused by drinking too much alcohol. In that case, treatment may include eating a healthy diet, taking vitamins, and stopping alcohol use. You may have physical therapy. This can increase muscle strength and help build muscle control. Over-the-counter medicine can relieve mild nerve pain. Your doctor may also prescribe medicine to help with severe pain, numbness, tingling, and weakness. If you have neuropathy in your feet, it's a good idea to have them checked during each office visit. This can help prevent problems. Some people find that physical therapy, acupuncture, or transcutaneous electrical nerve stimulation (TENS) helps relieve pain. Follow-up care is a key part of your treatment and safety. Be sure to make and go to all appointments, and call your doctor if you are having problems. It's also a good idea to know your test results and keep a list of the medicines you take. Where can you learn more? Go to http://katrin-connie.info/. Enter P130 in the search box to learn more about \"Learning About Peripheral Neuropathy. \" Current as of: July 25, 2018 Content Version: 11.9 © 9229-6144 [employer]. Care instructions adapted under license by [employer] (which disclaims liability or warranty for this information). If you have questions about a medical condition or this instruction, always ask your healthcare professional. [place] [address] any warranty or liability for your use of this information.

## 2019-02-28 NOTE — PROGRESS NOTES
Jed Guzman is a 80 y.o. female Chief Complaint Patient presents with  Medication Refill  
  multigen and lisinopril refill request  
 Hypertension Patient was interest in a annual medicare wellness visit last 07/2018. 1. Have you been to the ER, urgent care clinic since your last visit? Hospitalized since your last visit? No 
M 
2. Have you seen or consulted any other health care providers outside of the [address] [place] [address] since your last visit? Include any pap smears or colon screening. No 
 
 
Visit Vitals /78 (BP 1 Location: Right arm, BP Patient Position: Sitting) Pulse 75 Temp 97.9 °F (36.6 °C) (Oral) Resp 16 Ht 5' 6\" (1.676 m) Wt 155 lb (70.3 kg) SpO2 97% BMI 25.02 kg/m² Health Maintenance Due Topic Date Due  
 DTaP/Tdap/Td series (1 - Tdap) 03/30/1952  Shingrix Vaccine Age 50> (1 of 2) 03/30/1981  GLAUCOMA SCREENING Q2Y  03/30/1996  Pneumococcal 65+ Low/Medium Risk (2 of 2 - PCV13) 09/01/2015  Influenza Age 5 to Adult  08/01/2018 Medication Reconciliation completed, changes noted.   Please  Update medication list.

## 2019-02-28 NOTE — PATIENT INSTRUCTIONS
Learning About Peripheral Neuropathy What is peripheral neuropathy? Peripheral neuropathy is a problem that affects the peripheral nerves. These nerves lead from the spinal cord to other parts of the body. They control your sense of touch, how you feel pain and temperature, and your muscle strength. Most of the time the problem starts in the fingers and toes. As it gets worse, it moves into the limbs. It can cause pain and loss of feeling in the feet, legs, and hands. What causes it? There are several causes: · Diabetes. This is the most common cause. If your blood sugar is too high for too long, it can damage the nerves. · Kidney problems. These can lead to toxic substances in the blood that damage nerves. · Low levels of thyroid hormone (hypothyroidism). This may cause swelling of the tissues around the nerves, which can put pressure on them. · Infectious or inflammatory diseases. Examples are HIV and Guillain-Barré syndrome. These diseases can damage the nerves. · Peripheral nerve injury. A physical injury can damage the nerves. Injuries can be from things like falls, car crashes, or playing sports. · Overusing alcohol and not eating a healthy diet. These can lead to your body not having enough of certain vitamins, such as vitamin B-12. This can damage nerves. · Being exposed to toxic substances. These include lead, mercury, and certain medicines, such as those used for chemotherapy. Sometimes the cause isn't known. What are the symptoms? Symptoms of peripheral neuropathy can occur slowly over time. The most common ones are: · Numbness, tightness, and tingling, especially in the legs, hands, and feet. · Loss of feeling. · Burning, shooting, or stabbing pain in the legs, hands, and feet. Often the pain is worse at night. · Weakness and loss of balance. What can happen if you have it?  
If peripheral neuropathy gets worse, it can lead to a complete lack of feeling in your hands or feet. This can make you more likely to injure them. It may lead to calluses and blisters. It can also lead to bone and joint problems, infection, and ulcers. For instance, small, repeated injuries to the foot may lead to bigger problems. This can happen because you can't feel the injuries. Reduced feeling in the feet can also change your step, leading to bone or joint problems. If untreated, foot problems can become so severe that the foot or lower leg may have to be amputated. But treatment can slow down peripheral neuropathy. And it's a good idea to take care to avoid injury. How is it diagnosed? To diagnose peripheral neuropathy, your doctor will ask you about: 
· Your symptoms. · Your medical history. This may include your use of alcohol, risk of HIV infection, or exposure to toxic substances. · Your family's medical history, including nerve disease. Your doctor may test how well you can feel touch, temperature, and pain. You may also have blood tests. These tests will help the doctor find out if you have conditions that can cause neuropathy. Examples are diabetes, vitamin deficiencies, thyroid disease, and kidney problems. How is it treated? Treatment for peripheral neuropathy can relieve symptoms. This is done by treating the health problem that's causing it. For example, if you have diabetes, keeping your blood sugar within your target range may help. Or maybe your body lacks certain vitamins caused by drinking too much alcohol. In that case, treatment may include eating a healthy diet, taking vitamins, and stopping alcohol use. You may have physical therapy. This can increase muscle strength and help build muscle control. Over-the-counter medicine can relieve mild nerve pain. Your doctor may also prescribe medicine to help with severe pain, numbness, tingling, and weakness.  If you have neuropathy in your feet, it's a good idea to have them checked during each office visit. This can help prevent problems. Some people find that physical therapy, acupuncture, or transcutaneous electrical nerve stimulation (TENS) helps relieve pain. Follow-up care is a key part of your treatment and safety. Be sure to make and go to all appointments, and call your doctor if you are having problems. It's also a good idea to know your test results and keep a list of the medicines you take. Where can you learn more? Go to http://katrin-connie.info/. Enter P130 in the search box to learn more about \"Learning About Peripheral Neuropathy. \" Current as of: July 25, 2018 Content Version: 11.9 © 9293-9862 [employer]. Care instructions adapted under license by [employer] (which disclaims liability or warranty for this information). If you have questions about a medical condition or this instruction, always ask your healthcare professional. [address] any warranty or liability for your use of this information.

## 2019-08-02 ENCOUNTER — LAB ONLY (OUTPATIENT)
Dept: FAMILY MEDICINE CLINIC | Age: 84
End: 2019-08-02

## 2019-08-02 ENCOUNTER — HOSPITAL ENCOUNTER (OUTPATIENT)
Dept: LAB | Age: 84
Discharge: HOME | End: 2019-08-02
Payer: MEDICARE

## 2019-08-02 PROCEDURE — 82306 VITAMIN D 25 HYDROXY: CPT

## 2019-08-02 PROCEDURE — 80061 LIPID PANEL: CPT

## 2019-08-02 PROCEDURE — 85025 COMPLETE CBC W/AUTO DIFF WBC: CPT

## 2019-08-02 PROCEDURE — 36415 COLL VENOUS BLD VENIPUNCTURE: CPT

## 2019-08-02 PROCEDURE — 82607 VITAMIN B-12: CPT

## 2019-08-02 PROCEDURE — 80053 COMPREHEN METABOLIC PANEL: CPT

## 2019-08-02 PROCEDURE — 84443 ASSAY THYROID STIM HORMONE: CPT

## 2019-08-03 LAB
25(OH)D3+25(OH)D2 SERPL-MCNC: 15.7 NG/ML (ref 30–100)
ALBUMIN SERPL-MCNC: 4.1 G/DL (ref 3.5–4.7)
ALBUMIN/GLOB SERPL: 1.7 {RATIO} (ref 1.2–2.2)
ALP SERPL-CCNC: 67 IU/L (ref 39–117)
ALT SERPL-CCNC: 15 IU/L (ref 0–32)
AST SERPL-CCNC: 30 IU/L (ref 0–40)
BASOPHILS # BLD AUTO: 0 X10E3/UL (ref 0–0.2)
BASOPHILS NFR BLD AUTO: 0 %
BILIRUB SERPL-MCNC: 0.3 MG/DL (ref 0–1.2)
BUN SERPL-MCNC: 11 MG/DL (ref 8–27)
BUN/CREAT SERPL: 19 (ref 12–28)
CALCIUM SERPL-MCNC: 10.1 MG/DL (ref 8.7–10.3)
CHLORIDE SERPL-SCNC: 102 MMOL/L (ref 96–106)
CHOLEST SERPL-MCNC: 243 MG/DL (ref 100–199)
CO2 SERPL-SCNC: 25 MMOL/L (ref 20–29)
CREAT SERPL-MCNC: 0.57 MG/DL (ref 0.57–1)
EOSINOPHIL # BLD AUTO: 0 X10E3/UL (ref 0–0.4)
EOSINOPHIL NFR BLD AUTO: 1 %
ERYTHROCYTE [DISTWIDTH] IN BLOOD BY AUTOMATED COUNT: 15.5 % (ref 12.3–15.4)
GLOBULIN SER CALC-MCNC: 2.4 G/DL (ref 1.5–4.5)
GLUCOSE SERPL-MCNC: 91 MG/DL (ref 65–99)
HCT VFR BLD AUTO: 38.4 % (ref 34–46.6)
HDLC SERPL-MCNC: 47 MG/DL
HGB BLD-MCNC: 12.2 G/DL (ref 11.1–15.9)
IMM GRANULOCYTES # BLD AUTO: 0 X10E3/UL (ref 0–0.1)
IMM GRANULOCYTES NFR BLD AUTO: 1 %
INTERPRETATION, 910389: NORMAL
LDLC SERPL CALC-MCNC: 157 MG/DL (ref 0–99)
LYMPHOCYTES # BLD AUTO: 2.3 X10E3/UL (ref 0.7–3.1)
LYMPHOCYTES NFR BLD AUTO: 53 %
MCH RBC QN AUTO: 29 PG (ref 26.6–33)
MCHC RBC AUTO-ENTMCNC: 31.8 G/DL (ref 31.5–35.7)
MCV RBC AUTO: 91 FL (ref 79–97)
MONOCYTES # BLD AUTO: 0.3 X10E3/UL (ref 0.1–0.9)
MONOCYTES NFR BLD AUTO: 6 %
NEUTROPHILS # BLD AUTO: 1.7 X10E3/UL (ref 1.4–7)
NEUTROPHILS NFR BLD AUTO: 39 %
PLATELET # BLD AUTO: 222 X10E3/UL (ref 150–450)
POTASSIUM SERPL-SCNC: 4.4 MMOL/L (ref 3.5–5.2)
PROT SERPL-MCNC: 6.5 G/DL (ref 6–8.5)
RBC # BLD AUTO: 4.2 X10E6/UL (ref 3.77–5.28)
SODIUM SERPL-SCNC: 143 MMOL/L (ref 134–144)
TRIGL SERPL-MCNC: 197 MG/DL (ref 0–149)
TSH SERPL DL<=0.005 MIU/L-ACNC: 1.03 UIU/ML (ref 0.45–4.5)
VIT B12 SERPL-MCNC: 396 PG/ML (ref 232–1245)
VLDLC SERPL CALC-MCNC: 39 MG/DL (ref 5–40)
WBC # BLD AUTO: 4.4 X10E3/UL (ref 3.4–10.8)

## 2019-08-10 NOTE — PROGRESS NOTES
Recent labs done showed very low vitamin D. Would like to start patient on Vitamin D 8966-0188 international units daily OR  We can start vitamin D 50,000 international units. This would be a prescription. Also, her lipids show that her cholesterol levels are slightly elevated. In particular the triglycerides. Perhaps dietary modifications will be helpful before starting any medications. She can follow up with me to discuss these findings when I return.

## 2019-08-12 NOTE — PROGRESS NOTES
Spoke with patient and relayed result note message from Dr. Leopoldo Milian. Patient voiced understanding and stated she will purchase some Vit D 2000 to 5000 units ASAP. Also stated she has a follow up appointment on 9/6/19 to see Dr. Leopoldo Milian.

## 2019-08-29 ENCOUNTER — OFFICE VISIT (OUTPATIENT)
Dept: FAMILY MEDICINE CLINIC | Age: 84
End: 2019-08-29

## 2019-08-29 VITALS
HEART RATE: 85 BPM | WEIGHT: 152.8 LBS | SYSTOLIC BLOOD PRESSURE: 170 MMHG | BODY MASS INDEX: 24.56 KG/M2 | OXYGEN SATURATION: 96 % | RESPIRATION RATE: 20 BRPM | TEMPERATURE: 97.8 F | HEIGHT: 66 IN | DIASTOLIC BLOOD PRESSURE: 100 MMHG

## 2019-08-29 DIAGNOSIS — I10 ESSENTIAL HYPERTENSION WITH GOAL BLOOD PRESSURE LESS THAN 140/90: ICD-10-CM

## 2019-08-29 DIAGNOSIS — Z00.00 MEDICARE ANNUAL WELLNESS VISIT, SUBSEQUENT: Primary | ICD-10-CM

## 2019-08-29 RX ORDER — LISINOPRIL 10 MG/1
TABLET ORAL
Qty: 90 TAB | Refills: 1 | Status: SHIPPED | OUTPATIENT
Start: 2019-08-29 | End: 2020-07-07

## 2019-08-29 NOTE — PROGRESS NOTES
Patient presents for [facility] Wellness Visit. Without any new problems. Continues to have a balance problem and now walks with a cane. Patient is accompanied by her daughter.

## 2019-08-29 NOTE — PATIENT INSTRUCTIONS
HYPERTENSION  1. Please check BP daily at home x 2 weeks. 2. Make appointment and bring in the readings. 3. We will compare them with ours and also bring your machine. Well Visit, Over 72: Care Instructions  Your Care Instructions    Physical exams can help you stay healthy. Your doctor has checked your overall health and may have suggested ways to take good care of yourself. He or she also may have recommended tests. At home, you can help prevent illness with healthy eating, regular exercise, and other steps. Follow-up care is a key part of your treatment and safety. Be sure to make and go to all appointments, and call your doctor if you are having problems. It's also a good idea to know your test results and keep a list of the medicines you take. How can you care for yourself at home? · Reach and stay at a healthy weight. This will lower your risk for many problems, such as obesity, diabetes, heart disease, and high blood pressure. · Get at least 30 minutes of exercise on most days of the week. Walking is a good choice. You also may want to do other activities, such as running, swimming, cycling, or playing tennis or team sports. · Do not smoke. Smoking can make health problems worse. If you need help quitting, talk to your doctor about stop-smoking programs and medicines. These can increase your chances of quitting for good. · Protect your skin from too much sun. When you're outdoors from 10 a.m. to 4 p.m., stay in the shade or cover up with clothing and a hat with a wide brim. Wear sunglasses that block UV rays. Even when it's cloudy, put broad-spectrum sunscreen (SPF 30 or higher) on any exposed skin. · See a dentist one or two times a year for checkups and to have your teeth cleaned. · Wear a seat belt in the car. · Limit alcohol to 2 drinks a day for men and 1 drink a day for women. Too much alcohol can cause health problems. Follow your doctor's advice about when to have certain tests. These tests can spot problems early. For men and women  · Cholesterol. Your doctor will tell you how often to have this done based on your overall health and other things that can increase your risk for heart attack and stroke. · Blood pressure. Have your blood pressure checked during a routine doctor visit. Your doctor will tell you how often to check your blood pressure based on your age, your blood pressure results, and other factors. · Diabetes. Ask your doctor whether you should have tests for diabetes. · Vision. Experts recommend that you have yearly exams for glaucoma and other age-related eye problems. · Hearing. Tell your doctor if you notice any change in your hearing. You can have tests to find out how well you hear. · Colon cancer tests. Keep having colon cancer tests as your doctor recommends. You can have one of several types of tests. · Heart attack and stroke risk. At least every 4 to 6 years, you should have your risk for heart attack and stroke assessed. Your doctor uses factors such as your age, blood pressure, cholesterol, and whether you smoke or have diabetes to show what your risk for a heart attack or stroke is over the next 10 years. · Osteoporosis. Talk to your doctor about whether you should have a bone density test to find out whether you have thinning bones. Also ask your doctor about whether you should take calcium and vitamin D supplements. For women  · Pap test and pelvic exam. You may no longer need a Pap test. Talk with your doctor about whether to stop or continue to have Pap tests. · Breast exam and mammogram. Ask how often you should have a mammogram, which is an X-ray of your breasts. A mammogram can spot breast cancer before it can be felt and when it is easiest to treat. · Thyroid disease. Talk to your doctor about whether to have your thyroid checked as part of a regular physical exam. Women have an increased chance of a thyroid problem.   For men  · Prostate exam. Talk to your doctor about whether you should have a blood test (called a PSA test) for prostate cancer. Experts recommend that you discuss the benefits and risks of the test with your doctor before you decide whether to have this test. Some experts say that men ages 79 and older no longer need testing. · Abdominal aortic aneurysm. Ask your doctor whether you should have a test to check for an aneurysm. You may need a test if you ever smoked or if your parent, brother, sister, or child has had an aneurysm. When should you call for help? Watch closely for changes in your health, and be sure to contact your doctor if you have any problems or symptoms that concern you. Where can you learn more? Go to http://katrin-connie.info/. Enter J900 in the search box to learn more about \"Well Visit, Over 65: Care Instructions. \"  Current as of: December 13, 2018  Content Version: 12.1  © 4871-2773 [employer]. Care instructions adapted under license by [employer] (which disclaims liability or warranty for this information). If you have questions about a medical condition or this instruction, always ask your healthcare professional. [address] any warranty or liability for your use of this information.

## 2019-08-29 NOTE — PROGRESS NOTES
This is the Subsequent Medicare Annual Wellness Exam, performed 12 months or more after the Initial AWV or the last Subsequent AWV    I have reviewed the patient's medical history in detail and updated the computerized patient record. History     Past Medical History:   Diagnosis Date    Anemia     Sees Dr. Ginette Valencia and is on a prescription multivitamin call Multigen which has corrected her anemia    Headache     Hypertension     Intracranial hypotension 1998    had blood patch to correct    Meningitis 1950 & 2011    Osteochondritis 1975    Snoring       Past Surgical History:   Procedure Laterality Date    HX CATARACT REMOVAL  1997    HX DILATION AND CURETTAGE  1972    HX OTHER SURGICAL  1975    osteochroditis    [address]    blood patch for intercranial spontaneious hypotension      Current Outpatient Medications   Medication Sig Dispense Refill    lisinopril (PRINIVIL, ZESTRIL) 10 mg tablet TAKE ONE TABLET BY MOUTH ONCE DAILY 90 Tab 1    iron [id number] (MULTIGEN FOLIC) tab capsule TAKE ONE TABLET BY MOUTH ONCE DAILY 90 Tab 1    ASPIRIN/SALICYLAMIDE/CAFFEINE (BC HEADACHE POWDER PO) Take  by mouth. Allergies   Allergen Reactions    Hyzaar [Losartan-Hydrochlorothiazide] Vertigo    Pseudoephedrine Other (comments)     Rapid heart rate.  Naprosyn [Naproxen] Not Reported This Time     Family History   Problem Relation Age of Onset   [facility] Lung Disease Brother         lung cancer    Diabetes Mother     Thyroid Disease Mother         goiter    Diabetes Sister     Heart Disease Sister     Heart Disease Father     Stroke Father     Hypertension Brother     Stroke Brother     Cancer Brother     Parkinson's Disease Sister     Other Sister         brain aneurysm    Alzheimer Sister      Social History     Tobacco Use    Smoking status: Former Smoker    Smokeless tobacco: Never Used   Substance Use Topics    Alcohol use:  Yes     Alcohol/week: 1.0 standard drinks     Types: 1 Glasses of wine per week     Patient Active Problem List   Diagnosis Code    Essential hypertension with goal blood pressure less than 140/90 I10    Absolute anemia 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 Functional Ability and Level of Safety:   Hearing Loss  Hearing is good. Activities of Daily Living  The home contains: handrails and grab bars  Patient does total self care    Fall Risk  Fall Risk Assessment, last 12 mths 8/29/2019   Able to walk? Yes   Fall in past 12 months? No   Number of falls in past 12 months -       Abuse Screen  Patient is not abused    Cognitive Screening   Evaluation of Cognitive Function:  Has your family/caregiver stated any concerns about your memory: no  Mini Cog test - CDT normal 5/5. Patient Care Team   Patient Care Team:  Surendra Holden MD as PCP - General (Internal Medicine)    Assessment/Plan   Education and counseling provided:  Are appropriate based on today's review and evaluation  End-of-Life planning (with patient's consent)  Pneumococcal Vaccine  Influenza Vaccine  Cardiovascular screening blood test  Bone mass measurement (DEXA)  Screening for glaucoma     Diagnoses and all orders for this visit:    1. Medicare annual wellness visit, subsequent   Anticipatory guidance discussed. Immunizations reviewed   HM updated.      2. Essential hypertension with goal blood pressure less than 140/90  -     lisinopril (PRINIVIL, ZESTRIL) 10 mg tablet; TAKE ONE TABLET BY MOUTH ONCE DAILY    Health Maintenance Topic Date Due    DTaP/Tdap/Td series (1 - Tdap) Discussed    Shingrix Vaccine Age 50> (1 of 2) Discusssed    GLAUCOMA SCREENING Q2Y  Discussed    Pneumococcal 65+ years (2 of 2 - PCV13) Discussed    Influenza Age 5 to Adult  Discussed    MEDICARE YEARLY EXAM  08/29/2020    Bone Densitometry (Dexa) Screening  Completed     I have discussed the diagnosis with the patient and the intended treatment plan as seen in the above orders. The patient has received an after-visit summary and questions were answered concerning future plans. Asked to return should symptoms worsen or not improve with treatment. Any pending labs and studies will be relayed to patient when they become available. Pt verbalizes understanding of plan of care and denies further questions or concerns at this time. Follow-up and Dispositions    · Return in about 1 year (around 8/29/2020), or if symptoms worsen or fail to improve. Octavia Dinero MD  Patient Instructions        Well Visit, Over 72: Care Instructions  Your Care Instructions    Physical exams can help you stay healthy. Your doctor has checked your overall health and may have suggested ways to take good care of yourself. He or she also may have recommended tests. At home, you can help prevent illness with healthy eating, regular exercise, and other steps. Follow-up care is a key part of your treatment and safety. Be sure to make and go to all appointments, and call your doctor if you are having problems. It's also a good idea to know your test results and keep a list of the medicines you take. How can you care for yourself at home? · Reach and stay at a healthy weight. This will lower your risk for many problems, such as obesity, diabetes, heart disease, and high blood pressure. · Get at least 30 minutes of exercise on most days of the week. Walking is a good choice.  You also may want to do other activities, such as running, swimming, cycling, or playing tennis or team sports. · Do not smoke. Smoking can make health problems worse. If you need help quitting, talk to your doctor about stop-smoking programs and medicines. These can increase your chances of quitting for good. · Protect your skin from too much sun. When you're outdoors from 10 a.m. to 4 p.m., stay in the shade or cover up with clothing and a hat with a wide brim. Wear sunglasses that block UV rays. Even when it's cloudy, put broad-spectrum sunscreen (SPF 30 or higher) on any exposed skin. · See a dentist one or two times a year for checkups and to have your teeth cleaned. · Wear a seat belt in the car. · Limit alcohol to 2 drinks a day for men and 1 drink a day for women. Too much alcohol can cause health problems. Follow your doctor's advice about when to have certain tests. These tests can spot problems early. For men and women  · Cholesterol. Your doctor will tell you how often to have this done based on your overall health and other things that can increase your risk for heart attack and stroke. · Blood pressure. Have your blood pressure checked during a routine doctor visit. Your doctor will tell you how often to check your blood pressure based on your age, your blood pressure results, and other factors. · Diabetes. Ask your doctor whether you should have tests for diabetes. · Vision. Experts recommend that you have yearly exams for glaucoma and other age-related eye problems. · Hearing. Tell your doctor if you notice any change in your hearing. You can have tests to find out how well you hear. · Colon cancer tests. Keep having colon cancer tests as your doctor recommends. You can have one of several types of tests. · Heart attack and stroke risk. At least every 4 to 6 years, you should have your risk for heart attack and stroke assessed.  Your doctor uses factors such as your age, blood pressure, cholesterol, and whether you smoke or have diabetes to show what your risk for a heart attack or stroke is over the next 10 years. · Osteoporosis. Talk to your doctor about whether you should have a bone density test to find out whether you have thinning bones. Also ask your doctor about whether you should take calcium and vitamin D supplements. For women  · Pap test and pelvic exam. You may no longer need a Pap test. Talk with your doctor about whether to stop or continue to have Pap tests. · Breast exam and mammogram. Ask how often you should have a mammogram, which is an X-ray of your breasts. A mammogram can spot breast cancer before it can be felt and when it is easiest to treat. · Thyroid disease. Talk to your doctor about whether to have your thyroid checked as part of a regular physical exam. Women have an increased chance of a thyroid problem. For men  · Prostate exam. Talk to your doctor about whether you should have a blood test (called a PSA test) for prostate cancer. Experts recommend that you discuss the benefits and risks of the test with your doctor before you decide whether to have this test. Some experts say that men ages 79 and older no longer need testing. · Abdominal aortic aneurysm. Ask your doctor whether you should have a test to check for an aneurysm. You may need a test if you ever smoked or if your parent, brother, sister, or child has had an aneurysm. When should you call for help? Watch closely for changes in your health, and be sure to contact your doctor if you have any problems or symptoms that concern you. Where can you learn more? Go to http://katrin-connie.info/. Enter A850 in the search box to learn more about \"Well Visit, Over 65: Care Instructions. \"  Current as of: December 13, 2018  Content Version: 12.1  © 7750-6333 [employer]. Care instructions adapted under license by [employer] (which disclaims liability or warranty for this information).  If you have questions about a medical condition or this instruction, always ask your healthcare professional. [place] [address] any warranty or liability for your use of this information.

## 2019-09-10 RX ORDER — IRON ASPGLY/C/B12/FA/CA-TH/SUC 70-150-1MG
TABLET ORAL
Qty: 90 TAB | Refills: 1 | Status: SHIPPED | OUTPATIENT
Start: 2019-09-10 | End: 2020-04-09

## 2020-04-09 RX ORDER — IRON ASPGLY/C/B12/FA/CA-TH/SUC 70-150-1MG
TABLET ORAL
Qty: 90 TAB | Refills: 1 | Status: SHIPPED | OUTPATIENT
Start: 2020-04-09 | End: 2021-07-19

## 2020-07-07 DIAGNOSIS — I10 ESSENTIAL HYPERTENSION WITH GOAL BLOOD PRESSURE LESS THAN 140/90: ICD-10-CM

## 2020-07-07 RX ORDER — LISINOPRIL 10 MG/1
TABLET ORAL
Qty: 90 TAB | Refills: 0 | Status: SHIPPED | OUTPATIENT
Start: 2020-07-07 | End: 2020-12-09

## 2020-12-09 DIAGNOSIS — I10 ESSENTIAL HYPERTENSION WITH GOAL BLOOD PRESSURE LESS THAN 140/90: ICD-10-CM

## 2020-12-09 RX ORDER — LISINOPRIL 10 MG/1
TABLET ORAL
Qty: 90 TAB | Refills: 0 | Status: SHIPPED | OUTPATIENT
Start: 2020-12-09 | End: 2021-05-06 | Stop reason: SDUPTHER

## 2021-05-06 ENCOUNTER — OFFICE VISIT (OUTPATIENT)
Dept: FAMILY MEDICINE CLINIC | Age: 86
End: 2021-05-06
Payer: MEDICARE

## 2021-05-06 ENCOUNTER — TELEPHONE (OUTPATIENT)
Dept: FAMILY MEDICINE CLINIC | Age: 86
End: 2021-05-06

## 2021-05-06 DIAGNOSIS — E78.2 MIXED HYPERLIPIDEMIA: ICD-10-CM

## 2021-05-06 DIAGNOSIS — E55.9 VITAMIN D DEFICIENCY: ICD-10-CM

## 2021-05-06 DIAGNOSIS — Z00.00 MEDICARE ANNUAL WELLNESS VISIT, SUBSEQUENT: Primary | ICD-10-CM

## 2021-05-06 DIAGNOSIS — R05.8 ACE-INHIBITOR COUGH: ICD-10-CM

## 2021-05-06 DIAGNOSIS — G58.9 PINCHED NERVE IN NECK: ICD-10-CM

## 2021-05-06 DIAGNOSIS — I10 ESSENTIAL HYPERTENSION WITH GOAL BLOOD PRESSURE LESS THAN 140/90: ICD-10-CM

## 2021-05-06 DIAGNOSIS — T46.4X5A ACE-INHIBITOR COUGH: ICD-10-CM

## 2021-05-06 PROCEDURE — G0439 PPPS, SUBSEQ VISIT: HCPCS | Performed by: INTERNAL MEDICINE

## 2021-05-06 PROCEDURE — 1101F PT FALLS ASSESS-DOCD LE1/YR: CPT | Performed by: INTERNAL MEDICINE

## 2021-05-06 PROCEDURE — 99214 OFFICE O/P EST MOD 30 MIN: CPT | Performed by: INTERNAL MEDICINE

## 2021-05-06 PROCEDURE — G8510 SCR DEP NEG, NO PLAN REQD: HCPCS | Performed by: INTERNAL MEDICINE

## 2021-05-06 PROCEDURE — G8419 CALC BMI OUT NRM PARAM NOF/U: HCPCS | Performed by: INTERNAL MEDICINE

## 2021-05-06 PROCEDURE — G8427 DOCREV CUR MEDS BY ELIG CLIN: HCPCS | Performed by: INTERNAL MEDICINE

## 2021-05-06 PROCEDURE — 1090F PRES/ABSN URINE INCON ASSESS: CPT | Performed by: INTERNAL MEDICINE

## 2021-05-06 PROCEDURE — G0463 HOSPITAL OUTPT CLINIC VISIT: HCPCS | Performed by: INTERNAL MEDICINE

## 2021-05-06 PROCEDURE — G8536 NO DOC ELDER MAL SCRN: HCPCS | Performed by: INTERNAL MEDICINE

## 2021-05-06 RX ORDER — LOSARTAN POTASSIUM 50 MG/1
50 TABLET ORAL DAILY
Qty: 30 TAB | Refills: 5 | Status: SHIPPED | OUTPATIENT
Start: 2021-05-06 | End: 2021-06-05

## 2021-05-06 RX ORDER — LISINOPRIL 10 MG/1
10 TABLET ORAL DAILY
Qty: 90 TAB | Refills: 0 | Status: SHIPPED | OUTPATIENT
Start: 2021-05-06 | End: 2021-08-20 | Stop reason: ALTCHOICE

## 2021-05-06 RX ORDER — IRON ASPGLY/C/B12/FA/CA-TH/SUC 70-150-1MG
TABLET ORAL
Qty: 90 TAB | Refills: 1 | Status: SHIPPED | OUTPATIENT
Start: 2021-05-06 | End: 2022-09-13

## 2021-05-06 NOTE — TELEPHONE ENCOUNTER
[facility] called and would like to know which RX patient is supposed to be taking. He said 2 similar ones were sent in today

## 2021-05-06 NOTE — TELEPHONE ENCOUNTER
Per Dr Richard Torre pt is suppose to switch to losartan 50 mg. Discontinue lisinopril. Pharmacist understood.

## 2021-05-06 NOTE — PROGRESS NOTES
Identified pt with two pt identifiers(name and ). Chief Complaint   Patient presents with    Annual Wellness Visit    Cough     with congestion x 6-8 months    Hand Pain     rt hand while sleeping x 6-8 months    Neck Pain     x 6-8 months        Health Maintenance Due   Topic    COVID-19 Vaccine (1)    DTaP/Tdap/Td series (1 - Tdap)    Shingrix Vaccine Age 50> (1 of 2)       Wt Readings from Last 3 Encounters:   21 155 lb (70.3 kg)   19 152 lb 12.8 oz (69.3 kg)   19 155 lb (70.3 kg)     Temp Readings from Last 3 Encounters:   21 99.1 °F (37.3 °C) (Temporal)   19 97.8 °F (36.6 °C) (Oral)   19 97.9 °F (36.6 °C) (Oral)     BP Readings from Last 3 Encounters:   21 (!) 182/92   19 (!) 170/100   19 190/78     Pulse Readings from Last 3 Encounters:   21 (!) 110   19 85   19 75         Learning Assessment:  :     Learning Assessment 2016   PRIMARY LEARNER Patient Patient   HIGHEST LEVEL OF EDUCATION - PRIMARY LEARNER  - GRADUATED HIGH SCHOOL OR GED   BARRIERS PRIMARY LEARNER - NONE   CO-LEARNER CAREGIVER - No   PRIMARY LANGUAGE ENGLISH ENGLISH   LEARNER PREFERENCE PRIMARY DEMONSTRATION DEMONSTRATION     - LISTENING     - READING   ANSWERED BY patient  patient   RELATIONSHIP SELF SELF       Depression Screening:  :     3 most recent PHQ Screens 2021   Little interest or pleasure in doing things Not at all   Feeling down, depressed, irritable, or hopeless Several days   Total Score PHQ 2 1       Fall Risk Assessment:  :     Fall Risk Assessment, last 12 mths 2021   Able to walk? Yes   Fall in past 12 months? 0   Do you feel unsteady? 1   Are you worried about falling 0   Is the gait abnormal? 1   Number of falls in past 12 months -       Abuse Screening:  :     Abuse Screening Questionnaire 2021   Do you ever feel afraid of your partner?  N N N   Are you in a relationship with someone who physically or mentally threatens you? N N N   Is it safe for you to go home? Y Y Y       Coordination of Care Questionnaire:  :     1) Have you been to an emergency room, urgent care clinic since your last visit? no   Hospitalized since your last visit? no             2) Have you seen or consulted any other health care providers outside of [address] [place] [address] since your last visit? no  (Include any pap smears or colon screenings in this section.)    3) Do you have an Advance Directive on file? no  Are you interested in receiving information about Advance Directives? no    Patient is accompanied by daughter I have received verbal consent from Sheree Bentley to discuss any/all medical information while they are present in the room. Reviewed record in preparation for visit and have obtained necessary documentation.

## 2021-05-06 NOTE — PROGRESS NOTES
CC:  Chief Complaint   Patient presents with    Annual Wellness Visit    Cough     with congestion x 6-8 months    Hand Pain     rt hand while sleeping x 6-8 months    Neck Pain     x 6-8 months     This is the Subsequent Medicare Annual Wellness Exam, performed 12 months or more after the Initial AWV or the last Subsequent AWV    I have reviewed the patient's medical history in detail and updated the computerized patient record. Assessment/Plan   Education and counseling provided:  Are appropriate based on today's review and evaluation    1. Medicare annual wellness visit, subsequent   Anticipatory guidance discussed. Immunizations reviewed   HM updated. 2. Essential hypertension with goal blood pressure less than 140/90  -     lisinopriL (PRINIVIL, ZESTRIL) 10 mg tablet; Take 1 Tab by mouth daily. , Normal, Disp-90 Tab, R-0  -     METABOLIC PANEL, COMPREHENSIVE; Future  -     losartan (COZAAR) 50 mg tablet; Take 1 Tab by mouth daily for 30 days. , Normal, Disp-30 Tab, R-5    3. Pinched nerve in neck  -     REFERRAL TO PHYSICAL THERAPY  -     REFERRAL TO PHYSICAL THERAPY    4. ACE-inhibitor cough   Stop Lisinopril. Start Losartan. Follow up to check BP on new medication. 5. Mixed hyperlipidemia  -     METABOLIC PANEL, COMPREHENSIVE; Future  -     CBC W/O DIFF; Future  -     LIPID PANEL; Future    6. Vitamin D deficiency  -     VITAMIN D, 25 HYDROXY; Future     I have discussed the diagnosis with the patient and the intended treatment plan as seen in the above orders. The patient has received an after-visit summary and questions were answered concerning future plans. Asked to return should symptoms worsen or not improve with treatment. Any pending labs and studies will be relayed to patient when they become available. Pt verbalizes understanding of plan of care and denies further questions or concerns at this time.      Follow-up and Dispositions    · Return in about 1 year (around 5/6/2022), or if symptoms worsen or fail to improve, for Follow up 6 months for chronic issues. Follow-up and Disposition History          Depression Risk Factor Screening     3 most recent PHQ Screens 5/6/2021   Little interest or pleasure in doing things Not at all   Feeling down, depressed, irritable, or hopeless Several days   Total Score PHQ 2 1       Alcohol Risk Screen   Do you average more than 1 drink per night or more than 7 drinks a week:  No  On any one occasion in the past three months have you have had more than 3 drinks containing alcohol:  No    Functional Ability and Level of Safety    Hearing: Hearing is good. Activities of Daily Living: The home contains: no safety equipment. Patient does total self care      Ambulation: with no difficulty     Fall Risk:  Fall Risk Assessment, last 12 mths 5/6/2021   Able to walk? Yes   Fall in past 12 months? 0   Do you feel unsteady? 1   Are you worried about falling 0   Is the gait abnormal? 1   Number of falls in past 12 months -      Abuse Screen:  Patient is not abused       Cognitive Screening    Has your family/caregiver stated any concerns about your memory: no  Cognitive Screening: Normal - Clock Drawing Test, Mini Cog Test     OBJECTIVE:  Visit Vitals  BP (!) 182/92 (BP 1 Location: Right arm, BP Patient Position: Sitting, BP Cuff Size: Adult)   Pulse (!) 110   Temp 99.1 °F (37.3 °C) (Temporal)   Resp 20   Ht 5' 6\" (1.676 m)   Wt 155 lb (70.3 kg)   SpO2 96%   BMI 25.02 kg/m²     General appearance: alert, well appearing, and in no distress. Chest: clear to auscultation, no wheezes, rales or rhonchi, symmetric air entry. CVS exam: normal rate, regular rhythm, normal S1, S2, no murmurs, rubs, clicks or gallops. Oropharyngeal exam - mucous membranes moist, pharynx normal without lesions.   Ears - bilateral TM's and external ear canals normal.  Exam of extremities: peripheral pulses normal, no pedal edema, no clubbing or cyanosis  Skin exam - normal coloration and turgor, no rashes, no suspicious skin lesions noted. Health Maintenance Due     Health Maintenance Due   Topic Date Due    COVID-19 Vaccine (1) Discussed. Patient is waiting to have this done. 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 Charles Vizcaino and is on a prescription multivitamin call Multigen which has corrected her anemia    Headache     Hypertension     Intracranial hypotension 1998    had blood patch to correct    Meningitis 1950 & 2011    Osteochondritis 1975    Snoring       Past Surgical History:   Procedure Laterality Date    HX CATARACT REMOVAL  1997    HX DILATION AND CURETTAGE  1972    HX OTHER SURGICAL  1975    osteochroditis    [address] [facility]    blood patch for intercranial spontaneious hypotension      Current Outpatient Medications   Medication Sig Dispense Refill    lisinopriL (PRINIVIL, ZESTRIL) 10 mg tablet Take 1 Tab by mouth daily. 90 Tab 0    iron [id number] (Multigen Folic) tab capsule TAKE ONE TABLET BY MOUTH ONCE DAILY 90 Tab 1    losartan (COZAAR) 50 mg tablet Take 1 Tab by mouth daily for 30 days. 30 Tab 5    ASPIRIN/SALICYLAMIDE/CAFFEINE (BC HEADACHE POWDER PO) Take  by mouth.       Multigen Folic tab capsule TAKE 1 TABLET BY MOUTH EVERY DAY 90 Tab 1     Allergies   Allergen Reactions    Hyzaar [Losartan-Hydrochlorothiazide] Vertigo    Pseudoephedrine Other (comments)     Rapid heart rate.  Naprosyn [Naproxen] Not Reported This Time       Family History   Problem Relation Age of Onset   [facility] Lung Disease Brother         lung cancer    Diabetes Mother     Thyroid Disease Mother         goiter    Diabetes Sister     Heart Disease Sister     Heart Disease Father     Stroke Father     Hypertension Brother     Stroke Brother     Cancer Brother     Parkinson's Disease Sister     Other Sister         brain aneurysm    Alzheimer Sister      Social History     Tobacco Use    Smoking status: Former Smoker    Smokeless tobacco: Never Used   Substance Use Topics    Alcohol use: Yes     Alcohol/week: 1.0 standard drinks     Types: 1 Glasses of wine per week       Marcel Thomas MD     Patient Instructions        Well Visit, Over 65: Care Instructions  Overview     Well visits can help you stay healthy. Your doctor has checked your overall health and may have suggested ways to take good care of yourself. Your doctor also may have recommended tests. At home, you can help prevent illness with healthy eating, regular exercise, and other steps. Follow-up care is a key part of your treatment and safety. Be sure to make and go to all appointments, and call your doctor if you are having problems. It's also a good idea to know your test results and keep a list of the medicines you take. How can you care for yourself at home? · Get screening tests that you and your doctor decide on. Screening helps find diseases before any symptoms appear. · Eat healthy foods. Choose fruits, vegetables, whole grains, protein, and low-fat dairy foods. Limit fat, especially saturated fat. Reduce salt in your diet. · Limit alcohol. If you are a man, have no more than 2 drinks a day or 14 drinks a week. If you are a woman, have no more than 1 drink a day or 7 drinks a week.  Since alcohol affects older adults differently, you may want to limit alcohol even more. Or you may not want to drink at all. · Get at least 30 minutes of exercise on most days of the week. Walking is a good choice. You also may want to do other activities, such as running, swimming, cycling, or playing tennis or team sports. · Reach and stay at a healthy weight. This will lower your risk for many problems, such as obesity, diabetes, heart disease, and high blood pressure. · Do not smoke. Smoking can make health problems worse. If you need help quitting, talk to your doctor about stop-smoking programs and medicines. These can increase your chances of quitting for good. · Care for your mental health. It is easy to get weighed down by worry and stress. Learn strategies to manage stress, like deep breathing and mindfulness, and stay connected with your family and community. If you find you often feel sad or hopeless, talk with your doctor. Treatment can help. · Talk to your doctor about whether you have any risk factors for sexually transmitted infections (STIs). You can help prevent STIs if you wait to have sex with a new partner (or partners) until you've each been tested for STIs. It also helps if you use condoms (male or female condoms) and if you limit your sex partners to one person who only has sex with you. Vaccines are available for some STIs. · If you think you may have a problem with alcohol or drug use, talk to your doctor. This includes prescription medicines (such as amphetamines and opioids) and illegal drugs (such as cocaine and methamphetamine). Your doctor can help you figure out what type of treatment is best for you. · Protect your skin from too much sun. When you're outdoors from 10 a.m. to 4 p.m., stay in the shade or cover up with clothing and a hat with a wide brim. Wear sunglasses that block UV rays. Even when it's cloudy, put broad-spectrum sunscreen (SPF 30 or higher) on any exposed skin.   · See a dentist one or two times a year for checkups and to have your teeth cleaned. · Wear a seat belt in the car. When should you call for help? Watch closely for changes in your health, and be sure to contact your doctor if you have any problems or symptoms that concern you. Where can you learn more? Go to http://www.gray.com/  Enter [id number] in the search box to learn more about \"Well Visit, Over 65: Care Instructions. \"  Current as of: May 27, 2020               Content Version: 12.8  © 2006-2021 "[employer]. Care instructions adapted under license by [employer] (which disclaims liability or warranty for this information). If you have questions about a medical condition or this instruction, always ask your healthcare professional. [address] any warranty or liability for your use of this information. Pallavi Maguire

## 2021-05-06 NOTE — PATIENT INSTRUCTIONS
Well Visit, Over 72: Care Instructions  Overview     Well visits can help you stay healthy. Your doctor has checked your overall health and may have suggested ways to take good care of yourself. Your doctor also may have recommended tests. At home, you can help prevent illness with healthy eating, regular exercise, and other steps. Follow-up care is a key part of your treatment and safety. Be sure to make and go to all appointments, and call your doctor if you are having problems. It's also a good idea to know your test results and keep a list of the medicines you take. How can you care for yourself at home? · Get screening tests that you and your doctor decide on. Screening helps find diseases before any symptoms appear. · Eat healthy foods. Choose fruits, vegetables, whole grains, protein, and low-fat dairy foods. Limit fat, especially saturated fat. Reduce salt in your diet. · Limit alcohol. If you are a man, have no more than 2 drinks a day or 14 drinks a week. If you are a woman, have no more than 1 drink a day or 7 drinks a week. Since alcohol affects older adults differently, you may want to limit alcohol even more. Or you may not want to drink at all. · Get at least 30 minutes of exercise on most days of the week. Walking is a good choice. You also may want to do other activities, such as running, swimming, cycling, or playing tennis or team sports. · Reach and stay at a healthy weight. This will lower your risk for many problems, such as obesity, diabetes, heart disease, and high blood pressure. · Do not smoke. Smoking can make health problems worse. If you need help quitting, talk to your doctor about stop-smoking programs and medicines. These can increase your chances of quitting for good. · Care for your mental health. It is easy to get weighed down by worry and stress. Learn strategies to manage stress, like deep breathing and mindfulness, and stay connected with your family and community. If you find you often feel sad or hopeless, talk with your doctor. Treatment can help. · Talk to your doctor about whether you have any risk factors for sexually transmitted infections (STIs). You can help prevent STIs if you wait to have sex with a new partner (or partners) until you've each been tested for STIs. It also helps if you use condoms (male or female condoms) and if you limit your sex partners to one person who only has sex with you. Vaccines are available for some STIs. · If you think you may have a problem with alcohol or drug use, talk to your doctor. This includes prescription medicines (such as amphetamines and opioids) and illegal drugs (such as cocaine and methamphetamine). Your doctor can help you figure out what type of treatment is best for you. · Protect your skin from too much sun. When you're outdoors from 10 a.m. to 4 p.m., stay in the shade or cover up with clothing and a hat with a wide brim. Wear sunglasses that block UV rays. Even when it's cloudy, put broad-spectrum sunscreen (SPF 30 or higher) on any exposed skin. · See a dentist one or two times a year for checkups and to have your teeth cleaned. · Wear a seat belt in the car. When should you call for help? Watch closely for changes in your health, and be sure to contact your doctor if you have any problems or symptoms that concern you. Where can you learn more? Go to http://www.gray.com/  Enter [id number] in the search box to learn more about \"Well Visit, Over 65: Care Instructions. \"  Current as of: May 27, 2020               Content Version: 12.8  © 2105-8698 [employer]. Care instructions adapted under license by [employer] (which disclaims liability or warranty for this information).  If you have questions about a medical condition or this instruction, always ask your healthcare professional. [address] any warranty or liability for your use of this information.

## 2021-05-11 VITALS
OXYGEN SATURATION: 96 % | DIASTOLIC BLOOD PRESSURE: 92 MMHG | SYSTOLIC BLOOD PRESSURE: 182 MMHG | WEIGHT: 155 LBS | RESPIRATION RATE: 20 BRPM | TEMPERATURE: 99.1 F | BODY MASS INDEX: 24.91 KG/M2 | HEART RATE: 110 BPM | HEIGHT: 66 IN

## 2021-06-04 ENCOUNTER — LAB ONLY (OUTPATIENT)
Dept: FAMILY MEDICINE CLINIC | Age: 86
End: 2021-06-04

## 2021-06-04 DIAGNOSIS — I10 ESSENTIAL HYPERTENSION WITH GOAL BLOOD PRESSURE LESS THAN 140/90: ICD-10-CM

## 2021-06-04 DIAGNOSIS — E78.2 MIXED HYPERLIPIDEMIA: ICD-10-CM

## 2021-06-04 DIAGNOSIS — E55.9 VITAMIN D DEFICIENCY: ICD-10-CM

## 2021-06-04 LAB
25(OH)D3 SERPL-MCNC: 21.5 NG/ML (ref 30–100)
ALBUMIN SERPL-MCNC: 3.6 G/DL (ref 3.5–5)
ALBUMIN/GLOB SERPL: 1.1 {RATIO} (ref 1.1–2.2)
ALP SERPL-CCNC: 83 U/L (ref 45–117)
ALT SERPL-CCNC: 18 U/L (ref 12–78)
ANION GAP SERPL CALC-SCNC: 3 MMOL/L (ref 5–15)
AST SERPL-CCNC: 22 U/L (ref 15–37)
BILIRUB SERPL-MCNC: 0.6 MG/DL (ref 0.2–1)
BUN SERPL-MCNC: 7 MG/DL (ref 6–20)
BUN/CREAT SERPL: 13 (ref 12–20)
CALCIUM SERPL-MCNC: 9.7 MG/DL (ref 8.5–10.1)
CHLORIDE SERPL-SCNC: 104 MMOL/L (ref 97–108)
CHOLEST SERPL-MCNC: 222 MG/DL
CO2 SERPL-SCNC: 30 MMOL/L (ref 21–32)
CREAT SERPL-MCNC: 0.53 MG/DL (ref 0.55–1.02)
ERYTHROCYTE [DISTWIDTH] IN BLOOD BY AUTOMATED COUNT: 13.6 % (ref 11.5–14.5)
GLOBULIN SER CALC-MCNC: 3.2 G/DL (ref 2–4)
GLUCOSE SERPL-MCNC: 106 MG/DL (ref 65–100)
HCT VFR BLD AUTO: 39.8 % (ref 35–47)
HDLC SERPL-MCNC: 50 MG/DL
HDLC SERPL: 4.4 {RATIO} (ref 0–5)
HGB BLD-MCNC: 12.7 G/DL (ref 11.5–16)
LDLC SERPL CALC-MCNC: 143.6 MG/DL (ref 0–100)
MCH RBC QN AUTO: 29 PG (ref 26–34)
MCHC RBC AUTO-ENTMCNC: 31.9 G/DL (ref 30–36.5)
MCV RBC AUTO: 90.9 FL (ref 80–99)
NRBC # BLD: 0 K/UL (ref 0–0.01)
NRBC BLD-RTO: 0 PER 100 WBC
PLATELET # BLD AUTO: 228 K/UL (ref 150–400)
PMV BLD AUTO: 9.5 FL (ref 8.9–12.9)
POTASSIUM SERPL-SCNC: 4.3 MMOL/L (ref 3.5–5.1)
PROT SERPL-MCNC: 6.8 G/DL (ref 6.4–8.2)
RBC # BLD AUTO: 4.38 M/UL (ref 3.8–5.2)
SODIUM SERPL-SCNC: 137 MMOL/L (ref 136–145)
TRIGL SERPL-MCNC: 142 MG/DL (ref ?–150)
VLDLC SERPL CALC-MCNC: 28.4 MG/DL
WBC # BLD AUTO: 4.1 K/UL (ref 3.6–11)

## 2021-06-07 ENCOUNTER — TELEPHONE (OUTPATIENT)
Dept: FAMILY MEDICINE CLINIC | Age: 86
End: 2021-06-07

## 2021-06-07 NOTE — TELEPHONE ENCOUNTER
Spoke to pt and relayed results and let her know a letter with results and recommendations is being put in the mail to her. She understood.

## 2021-06-08 ENCOUNTER — VIRTUAL VISIT (OUTPATIENT)
Dept: FAMILY MEDICINE CLINIC | Age: 86
End: 2021-06-08
Payer: MEDICARE

## 2021-06-08 ENCOUNTER — TELEPHONE (OUTPATIENT)
Dept: FAMILY MEDICINE CLINIC | Age: 86
End: 2021-06-08

## 2021-06-08 ENCOUNTER — HOSPITAL ENCOUNTER (OUTPATIENT)
Dept: GENERAL RADIOLOGY | Age: 86
Discharge: HOME | End: 2021-06-08
Attending: INTERNAL MEDICINE
Payer: MEDICARE

## 2021-06-08 DIAGNOSIS — R50.9 FEVER, UNSPECIFIED FEVER CAUSE: ICD-10-CM

## 2021-06-08 DIAGNOSIS — R07.89 CHEST DISCOMFORT: ICD-10-CM

## 2021-06-08 DIAGNOSIS — R05.9 COUGH: ICD-10-CM

## 2021-06-08 DIAGNOSIS — R05.9 COUGH: Primary | ICD-10-CM

## 2021-06-08 PROCEDURE — 1101F PT FALLS ASSESS-DOCD LE1/YR: CPT | Performed by: INTERNAL MEDICINE

## 2021-06-08 PROCEDURE — 1090F PRES/ABSN URINE INCON ASSESS: CPT | Performed by: INTERNAL MEDICINE

## 2021-06-08 PROCEDURE — G8432 DEP SCR NOT DOC, RNG: HCPCS | Performed by: INTERNAL MEDICINE

## 2021-06-08 PROCEDURE — G8536 NO DOC ELDER MAL SCRN: HCPCS | Performed by: INTERNAL MEDICINE

## 2021-06-08 PROCEDURE — 71046 X-RAY EXAM CHEST 2 VIEWS: CPT

## 2021-06-08 PROCEDURE — G8427 DOCREV CUR MEDS BY ELIG CLIN: HCPCS | Performed by: INTERNAL MEDICINE

## 2021-06-08 PROCEDURE — G0463 HOSPITAL OUTPT CLINIC VISIT: HCPCS | Performed by: INTERNAL MEDICINE

## 2021-06-08 PROCEDURE — 99214 OFFICE O/P EST MOD 30 MIN: CPT | Performed by: INTERNAL MEDICINE

## 2021-06-08 PROCEDURE — G8419 CALC BMI OUT NRM PARAM NOF/U: HCPCS | Performed by: INTERNAL MEDICINE

## 2021-06-08 RX ORDER — AZITHROMYCIN 250 MG/1
TABLET, FILM COATED ORAL
Qty: 6 TABLET | Refills: 0 | Status: SHIPPED | OUTPATIENT
Start: 2021-06-08 | End: 2021-06-13

## 2021-06-08 NOTE — TELEPHONE ENCOUNTER
----- Message from Odilia Liu MD sent at 6/8/2021 12:18 PM EDT -----  Please let patient know that her CXR was normal. No evidence of pneumonia or any other worrisome findings. I did send the Z-mariana to her pharmacy and we will continue to monitor symptoms. She should definitely consider getting the COVID-19 test if symptoms persist or worsen.    Thanks,   Dr. Bin Espinosa

## 2021-06-08 NOTE — PROGRESS NOTES
Please let patient know that her CXR was normal. No evidence of pneumonia or any other worrisome findings. I did send the Z-mariana to her pharmacy and we will continue to monitor symptoms. She should definitely consider getting the COVID-19 test if symptoms persist or worsen.    Thanks,   Dr. Darling Husbands

## 2021-06-08 NOTE — PROGRESS NOTES
Chief Complaint   Patient presents with    Cold Symptoms     cough, fever 100.7, congestion, chest pain with deep breath. Patient has NOT had the COVID-19 vaccination. Keyana Murphy is a 80 y.o. female who was seen by synchronous (real-time) audio-video technology on 6/8/2021 for Cold Symptoms (cough, fever 100.7, congestion, chest pain with deep breath. Patient has NOT had the COVID-19 vaccination. )        Assessment & Plan:   Diagnoses and all orders for this visit:    1. Cough  -     XR CHEST PA LAT; Future  - Encouraged to get COVID-19 testing.   - Will advise when the xray results return. - Discussed that she should be seen in the ER if her SOB, CP or fever increases. 2. Chest discomfort  -     azithromycin (ZITHROMAX) 250 mg tablet; Take 2 tablets today, then take 1 tablet daily  -     XR CHEST PA LAT; Future    3. Fever, unspecified fever cause  -     XR CHEST PA LAT; Future    I have discussed the diagnosis with the patient and the intended treatment plan as seen in the above orders. Asked to return should symptoms worsen or not improve with treatment. Any pending labs and studies will be relayed to patient when they become available. Pt verbalizes understanding of plan of care and denies further questions or concerns at this time. Before closing this note, the patient had rapid COVID-19 and it was NEGATIVE. XR Results (most recent):  Results from Hospital Encounter encounter on 06/08/21    XR CHEST PA LAT    Narrative  Indication:  Worsening chest discomfort with fever and cough. concern is  pneumonia. Patient has NOT had COVID-19 vaccination. Exam: PA and lateral views of the chest.    Direct comparison is made to prior CXR dated June 2008. Findings: Cardiomediastinal silhouette is within normal limits. There is very  mild scarring within the right upper lung, not significantly changed. Lungs are  otherwise clear bilaterally.  Pleural spaces are normal. Osseous structures are  intact. Impression  No acute cardiopulmonary disease. She will continue with current treatment and follow up should her symptoms worsen or not improve. Follow-up and Dispositions    · Return if symptoms worsen or fail to improve. I spent at least 21 minutes on this visit with this established patient. Subjective:   89F who presented with onset of cough, fever and URI symptoms with mild SOB that started a few days ago. She has not had her COVID-19 vaccination. The patient has not had any travel or exposure to known infection. She feels lousy. She is present with her daughter via video call. 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 90 Tab 0    iron [id number] (Multigen Folic) tab capsule TAKE ONE TABLET BY MOUTH ONCE DAILY 90 Tab 1    Multigen Folic tab capsule TAKE 1 TABLET BY MOUTH EVERY DAY 90 Tab 1    ASPIRIN/SALICYLAMIDE/CAFFEINE (BC HEADACHE POWDER PO) Take  by mouth. Allergies   Allergen Reactions    Hyzaar [Losartan-Hydrochlorothiazide] Vertigo    Pseudoephedrine Other (comments)     Rapid heart rate.      Naprosyn [Naproxen] Not Reported This Time     Past Medical History:   Diagnosis Date    Anemia     Sees Dr. 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 & 2011    Osteochondritis 1975    Snoring      Past Surgical History:   Procedure Laterality Date    HX CATARACT REMOVAL  1997    HX DILATION AND CURETTAGE  1972    HX OTHER SURGICAL  1975    osteochroditis    HX OTHER SURGICAL  1998    blood patch for intercranial spontaneious hypotension      Family History   Problem Relation Age of Onset    Lung Disease Brother         lung cancer    Diabetes Mother     Thyroid Disease Mother         goiter    Diabetes Sister     Heart Disease Sister     Heart Disease Father     Stroke Father     Hypertension Brother     Stroke Brother     Cancer Brother     Parkinson's Disease Sister     Other Sister         brain aneurysm    Alzheimer Sister      Social History     Tobacco Use    Smoking status: Former Smoker    Smokeless tobacco: Never Used   Substance Use Topics    Alcohol use: Yes     Alcohol/week: 1.0 standard drinks     Types: 1 Glasses of wine per week       Review of Systems   Constitutional: Positive for chills, fever and malaise/fatigue. HENT: Positive for congestion. Eyes: Negative. Respiratory: Positive for cough, sputum production and shortness of breath. Cardiovascular: Negative. Gastrointestinal: Negative. Genitourinary: Negative. Musculoskeletal: Negative. Skin: Negative. Neurological: Negative. Endo/Heme/Allergies: Negative. Psychiatric/Behavioral: Negative. All other systems reviewed and are negative. Objective: There were no vitals taken for this visit. General: alert, cooperative, no distress, unwell appearing, but not toxic.     Mental  status: normal mood, behavior, speech, dress, motor activity, and thought processes, able to follow commands   HENT: NCAT   Neck: no visualized mass   Resp: no respiratory distress   Neuro: no gross deficits   Skin: no discoloration or lesions of concern on visible areas   Psychiatric: normal affect, consistent with stated mood, no evidence of hallucinations       We discussed the expected course, resolution and complications of the diagnosis(es) in detail. Medication risks, benefits, costs, interactions, and alternatives were discussed as indicated. I advised her to contact the office if her condition worsens, changes or fails to improve as anticipated. She expressed understanding with the diagnosis(es) and plan. Chacho Vernon, who was evaluated through a patient-initiated, synchronous (real-time) audio-video encounter, and/or her healthcare decision maker, is aware that it is a billable service, with coverage as determined by her insurance carrier. She provided verbal consent to proceed: Yes, and patient identification was verified. It was conducted pursuant to the emergency declaration under the [address] [place] [address], [address] [place] [address] authority and the [employer]. A caregiver was present when appropriate. Ability to conduct physical exam was limited. I was in the office. The patient was at home.       Guilherme Mcgee MD

## 2021-06-08 NOTE — TELEPHONE ENCOUNTER
Spoke to pt and relayed result message. She understood and stated her rapid covid test from [facility] came back negative.

## 2021-07-19 RX ORDER — IRON ASPGLY/C/B12/FA/CA-TH/SUC 70-150-1MG
TABLET ORAL
Qty: 90 TABLET | Refills: 1 | Status: SHIPPED | OUTPATIENT
Start: 2021-07-19 | End: 2022-01-17

## 2021-08-20 ENCOUNTER — OFFICE VISIT (OUTPATIENT)
Dept: NEUROLOGY | Age: 86
End: 2021-08-20
Payer: MEDICARE

## 2021-08-20 VITALS
WEIGHT: 151 LBS | BODY MASS INDEX: 24.27 KG/M2 | RESPIRATION RATE: 16 BRPM | HEIGHT: 66 IN | DIASTOLIC BLOOD PRESSURE: 82 MMHG | HEART RATE: 91 BPM | OXYGEN SATURATION: 96 % | SYSTOLIC BLOOD PRESSURE: 190 MMHG

## 2021-08-20 DIAGNOSIS — H53.9 VISUAL CHANGES: ICD-10-CM

## 2021-08-20 DIAGNOSIS — R26.9 GAIT DISORDER: ICD-10-CM

## 2021-08-20 DIAGNOSIS — M79.642 PAIN IN BOTH HANDS: Primary | ICD-10-CM

## 2021-08-20 DIAGNOSIS — M79.641 PAIN IN BOTH HANDS: Primary | ICD-10-CM

## 2021-08-20 PROCEDURE — G8420 CALC BMI NORM PARAMETERS: HCPCS | Performed by: SPECIALIST

## 2021-08-20 PROCEDURE — 1090F PRES/ABSN URINE INCON ASSESS: CPT | Performed by: SPECIALIST

## 2021-08-20 PROCEDURE — G8536 NO DOC ELDER MAL SCRN: HCPCS | Performed by: SPECIALIST

## 2021-08-20 PROCEDURE — 1101F PT FALLS ASSESS-DOCD LE1/YR: CPT | Performed by: SPECIALIST

## 2021-08-20 PROCEDURE — G8510 SCR DEP NEG, NO PLAN REQD: HCPCS | Performed by: SPECIALIST

## 2021-08-20 PROCEDURE — G8427 DOCREV CUR MEDS BY ELIG CLIN: HCPCS | Performed by: SPECIALIST

## 2021-08-20 PROCEDURE — 99214 OFFICE O/P EST MOD 30 MIN: CPT | Performed by: SPECIALIST

## 2021-08-20 RX ORDER — LOSARTAN POTASSIUM 50 MG/1
TABLET ORAL
COMMUNITY
Start: 2021-07-26 | End: 2022-01-13

## 2021-08-20 NOTE — PATIENT INSTRUCTIONS
Patient history reviewed patient examined. Went over several items of business. I am thinking on the hand symptoms I favor carpal tunnel over cervicogenic radiculopathy. Offered her a means of  the 2 with EMG and nerve conduction but patient politely declines. She is welcome to  carpal tunnel splints at the pharmacy and wear them at night and take them off during the day and see where that goes. Otherwise the phone lines are open she can let me know where that goes. Talked briefly about the neuropathy and how that can interfere with her sense of balance and safety. 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 I can't add anything to that. Currently see as needed.

## 2021-08-20 NOTE — PROGRESS NOTES
Neurology Consult      Subjective:      Isabel Be is a 80 y.o. female who comes in today with family. Chief complaint was hand pain and sensibility changes that for both hands was very explicitly graphed out to the median nerve by the patient herself without any prompting. Tends to happen at night where I suggest she probably gets in a fetal position although she does have cervicogenic discomfort from time to time. Suggested that we investigate with EMG and nerve conduction but wants to hold off. Wants to try wrist splints at night and take them off during the day and see where that goes. If I do not hear from her I will assume she is comfortable with that process. 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 Also referenced balance issues on her feet and we talked about her sensory neuropathy by EMG and nerve conduction and to keep track of her blood sugars as she had an extensive blood screen before they really did not detail anything for consideration. Suggested using her visual kelsie to assess her environment and stay safe and she has a straight cane. Good commonsense helps as well with regard to when and where and how she walks. Also was talking about some visual flashes in the periphery of both eyes and has seen academic and private ophthalmology from 3 different sources. I think it does sound like this is a type of vitreal detachment and irritation of the retina. I cannot add anything to that particular idea as I know her story line. At this point will be a see as needed with her decision to go with the hand splints.          Current Outpatient Medications   Medication Sig Dispense Refill    losartan (COZAAR) 50 mg tablet TAKE 1 TABLET BY MOUTH ONCE DAILY FOR 30 DAYS      Multigen Folic tab capsule TAKE 1 TABLET BY MOUTH EVERY DAY 90 Tablet 1    iron [id number] (Multigen Folic) tab capsule TAKE ONE TABLET BY MOUTH ONCE DAILY 90 Tab 1    ASPIRIN/SALICYLAMIDE/CAFFEINE (BC HEADACHE POWDER PO) Take  by mouth. Allergies   Allergen Reactions    Hyzaar [Losartan-Hydrochlorothiazide] Vertigo    Pseudoephedrine Other (comments)     Rapid heart rate.  Naprosyn [Naproxen] Not Reported This Time     Past Medical History:   Diagnosis Date    Anemia     Sees Dr. 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 & 2011    Osteochondritis 1975    Snoring       Past Surgical History:   Procedure Laterality Date    HX CATARACT REMOVAL  1997    HX DILATION AND CURETTAGE  1972    HX OTHER SURGICAL  1975    osteochroditis    [address]    blood patch for intercranial spontaneious hypotension       Social History     Socioeconomic History    Marital status:      Spouse name: Not on file    Number of children: Not on file    Years of education: Not on file    Highest education level: Not on file   Occupational History    Not on file   Tobacco Use    Smoking status: Former Smoker    Smokeless tobacco: Never Used   Substance and Sexual Activity    Alcohol use: Yes     Alcohol/week: 1.0 standard drinks     Types: 1 Glasses of wine per week    Drug use: No    Sexual activity: Never   Other Topics Concern    Not on file   Social History Narrative    Not on file     Social Determinants of Health     Financial Resource Strain:     Difficulty of Paying Living Expenses:    Food Insecurity:     Worried About Running Out of Food in the Last Year:     [address] in the Last Year:    Transportation Needs:     Lack of Transportation (Medical):      Lack of Transportation (Non-Medical):    Physical Activity:     Days of Exercise per Week:     Minutes of Exercise per Session:    Stress:     Feeling of Stress :    Social Connections:     Frequency of Communication with Friends and Family:     Frequency of Social Gatherings with Friends and Family:     Attends Judaism Services:     Active Member of Clubs or Organizations:     Attends Club or Organization Meetings:     Marital Status:    Intimate Partner Violence:     Fear of Current or Ex-Partner:     Emotionally Abused:     Physically Abused:     Sexually Abused:       Family History   Problem Relation Age of Onset    Lung Disease Brother         lung cancer    Diabetes Mother     Thyroid Disease Mother         goiter    Diabetes Sister     Heart Disease Sister     Heart Disease Father     Stroke Father     Hypertension Brother     Stroke Brother     Cancer Brother     Parkinson's Disease Sister     Other Sister         brain aneurysm    Alzheimer Sister       Visit Vitals  BP (!) 218/95 (BP 1 Location: Left upper arm, BP Patient Position: Sitting)   Pulse 91   Resp 16   Ht 5' 6\" (1.676 m)   Wt 68.5 kg (151 lb)   SpO2 96%   BMI 24.37 kg/m²        Review of Systems:   A comprehensive review of systems was negative except for that written in the HPI. Neuro Exam:     Appearance: The patient is well developed, well nourished, provides a coherent history and is in no acute distress. Mental Status: Oriented to time, place and person. Mood and affect appropriate. Cranial Nerves:   Intact visual fields. Fundi are benign. JORGE, EOM's full, no nystagmus, no ptosis. Facial sensation is normal. Corneal reflexes are intact. Facial movement is symmetric. Hearing is normal bilaterally. Palate is midline with normal sternocleidomastoid and trapezius muscles are normal. Tongue is midline. Motor:  5/5 strength in upper and lower proximal and distal muscles. Normal bulk and tone. No fasciculations. Reflexes:   Deep tendon reflexes 0-1+/4 and symmetrical.   Sensory:    Diminished distally to touch, pinprick and vibration.    Gait:   Transfers and gait taken slow and cautious but functional. Brought her straight cane. Tremor:   No tremor noted. Cerebellar:  No cerebellar signs present. Neurovascular:  Normal heart sounds and regular rhythm, peripheral pulses intact, and no carotid bruits. Tinel's over the wrists and ulnar grooves negative. Adson's maneuver negative. Phalen's negative. Does have visible osteoarthritis. Assessment:   Problem 1 hand pain clumsiness etc.  My bias on today's encounter based on history and exam would be carpal tunnel. Offer her a means of distinguishing between that and cervicogenic radiculopathy with EMG and nerve conduction. She will think about that. Says she will  some hand splints at the pharmacy and wear them at night and take them off during the day and see where that goes. She also has intrinsic osteoarthritic changes in her hand joints which I am sure does not help anything. Took time to go over the anatomy of the wrist and explained to patient and family how the median nerve gets entrapped by certain posture and activity. Problem 2 gait dysfunction. Went back to the original neuropathy concerns and how that interferes with her center of gravity and sense of balance on her feet. Had gone through an extensive lab review before and the only thing we found was some mild elevations in blood sugar. Problem 3 visual changes. Has seen 3 different ophthalmology services between academic and private offices. I think it does default to a type of vitreous detachment and irritation of the retina. I cannot add anything to that as I know the case.       Plan:   Is a see as needed based on the original problem #1 and her direction to try the hand splints first.  Signed by :  Aileen Mejia MD

## 2021-08-20 NOTE — PROGRESS NOTES
Chief Complaint   Patient presents with    Follow-up     neuropathy // bilat hands- pain and numbness     Pt presents today with daughter. They stated that she has white coat syndrome and that her BP is normally over 200 at any dr's visit. States she takes her Losartan 50 mg nightly and has not missed any doses. BP this AM here in the office this AM reads 218/95. Pt is asymptomatic. Advise to consult her PCP and/or seek ER follow up for this reading. We will retake this BP again at the close of visit. Dr Guille Hernandez advised of BP reading. Re-checked BP at the end of the visit manually and reading is 190/82. Note faxed to PCP advising of her readings in the office today.

## 2022-01-13 RX ORDER — LOSARTAN POTASSIUM 50 MG/1
TABLET ORAL
Qty: 90 TABLET | Refills: 0 | Status: SHIPPED | OUTPATIENT
Start: 2022-01-13 | End: 2022-04-13

## 2022-01-17 RX ORDER — IRON ASPGLY/C/B12/FA/CA-TH/SUC 70-150-1MG
TABLET ORAL
Qty: 90 TABLET | Refills: 0 | Status: SHIPPED | OUTPATIENT
Start: 2022-01-17 | End: 2022-05-11

## 2022-03-18 PROBLEM — G62.9 NEUROPATHY: Status: ACTIVE | Noted: 2018-01-22

## 2022-03-19 PROBLEM — D64.9 ABSOLUTE ANEMIA: Status: ACTIVE | Noted: 2017-04-04

## 2022-04-13 RX ORDER — LOSARTAN POTASSIUM 50 MG/1
TABLET ORAL
Qty: 90 TABLET | Refills: 0 | Status: SHIPPED | OUTPATIENT
Start: 2022-04-13 | End: 2022-07-26 | Stop reason: DRUGHIGH

## 2022-05-11 RX ORDER — IRON ASPGLY/C/B12/FA/CA-TH/SUC 70-150-1MG
TABLET ORAL
Qty: 90 TABLET | Refills: 0 | Status: SHIPPED | OUTPATIENT
Start: 2022-05-11 | End: 2022-07-26 | Stop reason: SDUPTHER

## 2022-06-28 ENCOUNTER — TELEPHONE (OUTPATIENT)
Dept: NEUROLOGY | Age: 87
End: 2022-06-28

## 2022-06-28 NOTE — TELEPHONE ENCOUNTER
The neurosurgeon was at [facility] neurosurgery, Dr. Osiel Hyatt. But I am sure he is retired.  If we could call that department and find out who is doing that procedure now, that is who the daughter can be advised of. criss

## 2022-06-28 NOTE — TELEPHONE ENCOUNTER
If this is a new patient, would like the benefit of what is the basis of this referral to make sure this is appropriate etc...  otherwise I feel like I am taking advantage of the patient w/o additional info. [email or web address]

## 2022-06-28 NOTE — TELEPHONE ENCOUNTER
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 Her mother would like the name of the specialist so she can set up the appt. Please call.

## 2022-07-11 ENCOUNTER — TELEPHONE (OUTPATIENT)
Dept: NEUROLOGY | Age: 87
End: 2022-07-11

## 2022-07-11 NOTE — TELEPHONE ENCOUNTER
Patient daughter calling about a referral that Dr. Bolivar Nunez was suppose to give her for a specialist for her mother. It's for  NPH condition.

## 2022-07-11 NOTE — TELEPHONE ENCOUNTER
Isabelle Miranda, if we could call [facility], I have no idea who took Dr. Angela matias for that evaluation and treatment of normal pressure hydrocephalus.  [email or web address]

## 2022-07-19 RX ORDER — LOSARTAN POTASSIUM 50 MG/1
TABLET ORAL
Qty: 90 TABLET | Refills: 0 | OUTPATIENT
Start: 2022-07-19

## 2022-07-26 ENCOUNTER — OFFICE VISIT (OUTPATIENT)
Dept: FAMILY MEDICINE CLINIC | Age: 87
End: 2022-07-26
Payer: MEDICARE

## 2022-07-26 VITALS
DIASTOLIC BLOOD PRESSURE: 84 MMHG | WEIGHT: 153 LBS | SYSTOLIC BLOOD PRESSURE: 178 MMHG | BODY MASS INDEX: 24.59 KG/M2 | TEMPERATURE: 97.6 F | HEART RATE: 99 BPM | OXYGEN SATURATION: 97 % | RESPIRATION RATE: 20 BRPM | HEIGHT: 66 IN

## 2022-07-26 DIAGNOSIS — Z23 ENCOUNTER FOR IMMUNIZATION: ICD-10-CM

## 2022-07-26 DIAGNOSIS — E78.2 MIXED HYPERLIPIDEMIA: ICD-10-CM

## 2022-07-26 DIAGNOSIS — Z78.0 POST-MENOPAUSE: ICD-10-CM

## 2022-07-26 DIAGNOSIS — E55.9 VITAMIN D DEFICIENCY: ICD-10-CM

## 2022-07-26 DIAGNOSIS — Z13.820 SCREENING FOR OSTEOPOROSIS: ICD-10-CM

## 2022-07-26 DIAGNOSIS — Z91.81 AT MODERATE RISK FOR FALL: ICD-10-CM

## 2022-07-26 DIAGNOSIS — I10 PRIMARY HYPERTENSION: ICD-10-CM

## 2022-07-26 DIAGNOSIS — Z00.00 MEDICARE ANNUAL WELLNESS VISIT, SUBSEQUENT: Primary | ICD-10-CM

## 2022-07-26 PROBLEM — M65.311 TRIGGER FINGER OF RIGHT THUMB: Status: ACTIVE | Noted: 2022-06-15

## 2022-07-26 PROBLEM — G56.03 BILATERAL CARPAL TUNNEL SYNDROME: Status: ACTIVE | Noted: 2022-06-15

## 2022-07-26 PROCEDURE — G8420 CALC BMI NORM PARAMETERS: HCPCS | Performed by: INTERNAL MEDICINE

## 2022-07-26 PROCEDURE — 90732 PPSV23 VACC 2 YRS+ SUBQ/IM: CPT | Performed by: INTERNAL MEDICINE

## 2022-07-26 PROCEDURE — G0439 PPPS, SUBSEQ VISIT: HCPCS | Performed by: INTERNAL MEDICINE

## 2022-07-26 PROCEDURE — 1101F PT FALLS ASSESS-DOCD LE1/YR: CPT | Performed by: INTERNAL MEDICINE

## 2022-07-26 PROCEDURE — G8536 NO DOC ELDER MAL SCRN: HCPCS | Performed by: INTERNAL MEDICINE

## 2022-07-26 PROCEDURE — G8427 DOCREV CUR MEDS BY ELIG CLIN: HCPCS | Performed by: INTERNAL MEDICINE

## 2022-07-26 PROCEDURE — G8510 SCR DEP NEG, NO PLAN REQD: HCPCS | Performed by: INTERNAL MEDICINE

## 2022-07-26 PROCEDURE — 99214 OFFICE O/P EST MOD 30 MIN: CPT | Performed by: INTERNAL MEDICINE

## 2022-07-26 PROCEDURE — G0463 HOSPITAL OUTPT CLINIC VISIT: HCPCS | Performed by: INTERNAL MEDICINE

## 2022-07-26 PROCEDURE — 1123F ACP DISCUSS/DSCN MKR DOCD: CPT | Performed by: INTERNAL MEDICINE

## 2022-07-26 PROCEDURE — 1090F PRES/ABSN URINE INCON ASSESS: CPT | Performed by: INTERNAL MEDICINE

## 2022-07-26 RX ORDER — LOSARTAN POTASSIUM 100 MG/1
100 TABLET ORAL DAILY
Qty: 30 TABLET | Refills: 5 | Status: SHIPPED | OUTPATIENT
Start: 2022-07-26 | End: 2022-08-22 | Stop reason: SDUPTHER

## 2022-07-26 RX ORDER — ZOSTER VACCINE RECOMBINANT, ADJUVANTED 50 MCG/0.5
0.5 KIT INTRAMUSCULAR ONCE
Qty: 0.5 ML | Refills: 1 | Status: SHIPPED | OUTPATIENT
Start: 2022-07-26 | End: 2022-07-26

## 2022-07-26 NOTE — PROGRESS NOTES
CC:  Chief Complaint   Patient presents with    Annual Wellness Visit    Hypertension     This is the Subsequent Medicare Annual Wellness Exam, performed 12 months or more after the Initial AWV or the last Subsequent AWV    I have reviewed the patient's medical history in detail and updated the computerized patient record. 89F who presents with her daughter for this AWV, medication refill and fasting labs. She has been generally doing well. She has a h/o uncontrolled HTN and is in the process of being evaluated by [facility] for possible normal pressure hypertension. Also, tomorrow, she will be having hand EMG studies for ongoing hand pain and weakness. She see's Dr. Brittany Espinoza. Assessment/Plan   Education and counseling provided:  Are appropriate based on today's review and evaluation  End-of-Life planning (with patient's consent)  Pneumococcal Vaccine  Influenza Vaccine  Hepatitis B Vaccine  Bone mass measurement (DEXA)  Screening for glaucoma  Diabetes screening test    Diagnoses and all orders for this visit:    1. Medicare annual wellness visit, subsequent   Anticipatory guidance discussed. Immunizations reviewed  HM updated. 2. Primary hypertension  -     losartan (COZAAR) 100 mg tablet; Take 1 Tablet by mouth in the morning for 30 days. 3. Vitamin D deficiency  -     VITAMIN D, 25 HYDROXY; Future    4. Mixed hyperlipidemia  -     METABOLIC PANEL, COMPREHENSIVE; Future  -     CBC WITH AUTOMATED DIFF; Future  -     LIPID PANEL; Future    5. Screening for osteoporosis  -     DEXA BONE DENSITY STUDY AXIAL; Future    6. Post-menopause  -     DEXA BONE DENSITY STUDY AXIAL; Future    7. At moderate risk for fall   Discussed. Using cane and walker to minimize falls.      8. Encounter for immunization  -     PNEUMOCOCCAL, PPSV23, (AGE 2 YRS+), SC/IM  -     ADMIN PNEUMOCOCCAL VACCINE    Other orders  -     varicella-zoster recombinant, PF, (Shingrix, PF,) 50 mcg/0.5 mL susr injection; 0.5 mL by IntraMUSCular route once for 1 dose. 2nd dose in 2-4 months. Declines COVID-19 vaccines at this time. I have discussed the diagnosis with the patient and the intended treatment plan as seen in the above orders. The patient has received an after-visit summary and questions were answered concerning future plans. Asked to return should symptoms worsen or not improve with treatment. Any pending labs and studies will be relayed to patient when they become available. Pt verbalizes understanding of plan of care and denies further questions or concerns at this time. Follow-up and Dispositions    Return in 1 year (on 7/26/2023), or if symptoms worsen or fail to improve, for Recheck BP in 4 weeks. Depression Risk Factor Screening     3 most recent PHQ Screens 7/26/2022   Little interest or pleasure in doing things Not at all   Feeling down, depressed, irritable, or hopeless Not at all   Total Score PHQ 2 0       Alcohol & Drug Abuse Risk Screen    Do you average more than 1 drink per night or more than 7 drinks a week:  No    On any one occasion in the past three months have you have had more than 3 drinks containing alcohol:  No          Functional Ability and Level of Safety    Hearing: Hearing is good. Activities of Daily Living: The home contains: handrails and grab bars  Patient needs help with:  transportation and shopping      Ambulation: with mild difficulty     Fall Risk:  Fall Risk Assessment, last 12 mths 7/26/2022   Able to walk? Yes   Fall in past 12 months? 0   Do you feel unsteady?  1   Are you worried about falling 1   Is the gait abnormal? 1   Number of falls in past 12 months 0      Abuse Screen:  Patient is not abused       Cognitive Screening    Has your family/caregiver stated any concerns about your memory: no  Cognitive Screening: Normal - Clock Drawing Test, Mini Cog Test    Visit Vitals  BP (!) 178/84 (BP 1 Location: Right arm, BP Patient Position: Sitting, BP Cuff Size: Adult)   Pulse 99   Temp 97.6 °F (36.4 °C) (Temporal)   Resp 20   Ht 5' 6\" (1.676 m)   Wt 153 lb (69.4 kg)   SpO2 97%   BMI 24.69 kg/m²     General appearance: alert, well appearing, and in no distress. Chest: clear to auscultation, no wheezes, rales or rhonchi, symmetric air entry. CVS exam: normal rate, regular rhythm, normal S1, S2, no murmurs, rubs, clicks or gallops. Abdominal exam: soft, nontender, nondistended, no masses or organomegaly. Exam of extremities: peripheral pulses normal, no pedal edema, no clubbing or cyanosis  Skin exam - normal coloration and turgor, no rashes, no suspicious skin lesions noted. Neurological exam reveals alert, oriented, normal speech, no focal findings or movement disorder noted. Health Maintenance Due     Health Maintenance Due   Topic Date Due    DTaP/Tdap/Td series (1 - Tdap) Reviewed. Shingrix Vaccine Age 50> (1 of 2) Reviewed. Order given    Pneumococcal 65+ years (2 - PCV) Reviewed.  Vaccine given in the office today       Patient Care Team   Patient Care Team:  Yossi Cardona MD as PCP - General (Internal Medicine Physician)  Yossi Cardona MD as PCP - 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 Margie Chow and is on a prescription multivitamin call Multigen which has corrected her anemia    Headache     Hypertension     Intracranial hypotension 1998    had blood patch to correct    Meningitis 1950 & 2011    Osteochondritis 1975    Snoring       Past Surgical History:   Procedure Laterality Date    HX CATARACT REMOVAL  1997    HX DILATION AND CURETTAGE  1972    HX OTHER SURGICAL  1975    osteochroditis    HX OTHER SURGICAL  1998    blood patch for intercranial spontaneious hypotension      Current Outpatient Medications   Medication Sig Dispense Refill    iron [id number] (Multigen Folic) tab capsule TAKE ONE TABLET BY MOUTH ONCE DAILY 90 Tab 1    ASPIRIN/SALICYLAMIDE/CAFFEINE (BC HEADACHE POWDER PO) Take  by mouth as needed. losartan (COZAAR) 50 mg tablet Take 1 tablet by mouth once daily (Patient not taking: Reported on 7/26/2022) 90 Tablet 0     Allergies   Allergen Reactions    Hyzaar [Losartan-Hydrochlorothiazide] Vertigo    Pseudoephedrine Other (comments)     Rapid heart rate. Naprosyn [Naproxen] Not Reported This Time       Family History   Problem Relation Age of Onset    Lung Disease Brother         lung cancer    Diabetes Mother     Thyroid Disease Mother         goiter    Diabetes Sister     Heart Disease Sister     Heart Disease Father     Stroke Father     Hypertension Brother     Stroke Brother     Cancer Brother     Parkinson's Disease Sister     Other Sister         brain aneurysm    Alzheimer's Disease Sister      Social History     Tobacco Use    Smoking status: Former    Smokeless tobacco: Never   Substance Use Topics    Alcohol use:  Yes     Alcohol/week: 1.0 standard drink     Types: 1 Glasses of wine per week     Mau Lazo MD

## 2022-07-26 NOTE — PROGRESS NOTES
Identified pt with two pt identifiers(name and ). Chief Complaint   Patient presents with    Annual Wellness Visit    Hypertension        Health Maintenance Due   Topic    COVID-19 Vaccine (1)    DTaP/Tdap/Td series (1 - Tdap)    Shingrix Vaccine Age 50> (1 of 2)    Pneumococcal 65+ years (2 - PCV)    Medicare Yearly Exam        Wt Readings from Last 3 Encounters:   22 153 lb (69.4 kg)   21 151 lb (68.5 kg)   21 155 lb (70.3 kg)     Temp Readings from Last 3 Encounters:   22 97.6 °F (36.4 °C) (Temporal)   21 99.1 °F (37.3 °C) (Temporal)   19 97.8 °F (36.6 °C) (Oral)     BP Readings from Last 3 Encounters:   22 (!) 178/84   21 (!) 190/82   21 (!) 182/92     Pulse Readings from Last 3 Encounters:   22 99   21 91   21 (!) 110         Learning Assessment:  :     Learning Assessment 2016   PRIMARY LEARNER Patient Patient   HIGHEST LEVEL OF EDUCATION - PRIMARY LEARNER  - GRADUATED HIGH SCHOOL OR GED   BARRIERS PRIMARY LEARNER - NONE   CO-LEARNER CAREGIVER - No   PRIMARY LANGUAGE ENGLISH ENGLISH   LEARNER PREFERENCE PRIMARY DEMONSTRATION DEMONSTRATION     - LISTENING     - READING   ANSWERED BY patient  patient   RELATIONSHIP SELF SELF       Depression Screening:  :     3 most recent PHQ Screens 2022   Little interest or pleasure in doing things Not at all   Feeling down, depressed, irritable, or hopeless Not at all   Total Score PHQ 2 0       Fall Risk Assessment:  :     Fall Risk Assessment, last 12 mths 2022   Able to walk? Yes   Fall in past 12 months? 0   Do you feel unsteady? 1   Are you worried about falling 1   Is the gait abnormal? 1   Number of falls in past 12 months 0       Abuse Screening:  :     Abuse Screening Questionnaire 2022   Do you ever feel afraid of your partner? N N N N   Are you in a relationship with someone who physically or mentally threatens you?  N N N N   Is it safe for you to go home? Y Y Y Y       Coordination of Care Questionnaire:  :     1) Have you been to an emergency room, urgent care clinic since your last visit? no   Hospitalized since your last visit? no             2) Have you seen or consulted any other health care providers outside of [address] [place] [address] since your last visit? Yes  Dr Campos [place]- eye   Dr Bhumi Brandt     3) Do you have an Advance Directive on file? no  Are you interested in receiving information about Advance Directives? no    Patient is accompanied by daughter I have received verbal consent from Umberto Doyle to discuss any/all medical information while they are present in the room. 4.  For patients aged 39-70: Has the patient had a colonoscopy / FIT/ Cologuard? NA - based on age      If the patient is female:    11. For patients aged 41-77: Has the patient had a mammogram within the past 2 years? NA - based on age or sex      10. For patients aged 21-65: Has the patient had a pap smear?  NA - based on age or sex

## 2022-08-02 ENCOUNTER — LAB ONLY (OUTPATIENT)
Dept: FAMILY MEDICINE CLINIC | Age: 87
End: 2022-08-02

## 2022-08-02 DIAGNOSIS — E55.9 VITAMIN D DEFICIENCY: ICD-10-CM

## 2022-08-02 DIAGNOSIS — E78.2 MIXED HYPERLIPIDEMIA: ICD-10-CM

## 2022-08-03 ENCOUNTER — TELEPHONE (OUTPATIENT)
Dept: FAMILY MEDICINE CLINIC | Age: 87
End: 2022-08-03

## 2022-08-03 DIAGNOSIS — D72.820 ELEVATED LYMPHOCYTE COUNT: Primary | ICD-10-CM

## 2022-08-03 LAB
25(OH)D3 SERPL-MCNC: 22.2 NG/ML (ref 30–100)
ALBUMIN SERPL-MCNC: 3.5 G/DL (ref 3.5–5)
ALBUMIN/GLOB SERPL: 1.1 {RATIO} (ref 1.1–2.2)
ALP SERPL-CCNC: 71 U/L (ref 45–117)
ALT SERPL-CCNC: 19 U/L (ref 12–78)
ANION GAP SERPL CALC-SCNC: 5 MMOL/L (ref 5–15)
AST SERPL-CCNC: 22 U/L (ref 15–37)
BASOPHILS # BLD: 0 K/UL (ref 0–0.1)
BASOPHILS NFR BLD: 1 % (ref 0–1)
BILIRUB SERPL-MCNC: 0.4 MG/DL (ref 0.2–1)
BUN SERPL-MCNC: 11 MG/DL (ref 6–20)
BUN/CREAT SERPL: 18 (ref 12–20)
CALCIUM SERPL-MCNC: 10 MG/DL (ref 8.5–10.1)
CHLORIDE SERPL-SCNC: 106 MMOL/L (ref 97–108)
CHOLEST SERPL-MCNC: 210 MG/DL
CO2 SERPL-SCNC: 28 MMOL/L (ref 21–32)
CREAT SERPL-MCNC: 0.6 MG/DL (ref 0.55–1.02)
DIFFERENTIAL METHOD BLD: ABNORMAL
EOSINOPHIL # BLD: 0 K/UL (ref 0–0.4)
EOSINOPHIL NFR BLD: 0 % (ref 0–7)
ERYTHROCYTE [DISTWIDTH] IN BLOOD BY AUTOMATED COUNT: 14.3 % (ref 11.5–14.5)
GLOBULIN SER CALC-MCNC: 3.1 G/DL (ref 2–4)
GLUCOSE SERPL-MCNC: 106 MG/DL (ref 65–100)
HCT VFR BLD AUTO: 42.6 % (ref 35–47)
HDLC SERPL-MCNC: 49 MG/DL
HDLC SERPL: 4.3 {RATIO} (ref 0–5)
HGB BLD-MCNC: 13.5 G/DL (ref 11.5–16)
IMM GRANULOCYTES # BLD AUTO: 0 K/UL
IMM GRANULOCYTES NFR BLD AUTO: 0 %
LDLC SERPL CALC-MCNC: 138.2 MG/DL (ref 0–100)
LYMPHOCYTES # BLD: 2.1 K/UL (ref 0.8–3.5)
LYMPHOCYTES NFR BLD: 60 % (ref 12–49)
MCH RBC QN AUTO: 29.3 PG (ref 26–34)
MCHC RBC AUTO-ENTMCNC: 31.7 G/DL (ref 30–36.5)
MCV RBC AUTO: 92.6 FL (ref 80–99)
MONOCYTES # BLD: 0.4 K/UL (ref 0–1)
MONOCYTES NFR BLD: 13 % (ref 5–13)
NEUTS SEG # BLD: 0.9 K/UL (ref 1.8–8)
NEUTS SEG NFR BLD: 26 % (ref 32–75)
NRBC # BLD: 0 K/UL (ref 0–0.01)
NRBC BLD-RTO: 0 PER 100 WBC
PATH REV BLD -IMP: ABNORMAL
PLATELET # BLD AUTO: 190 K/UL (ref 150–400)
PMV BLD AUTO: 9 FL (ref 8.9–12.9)
POTASSIUM SERPL-SCNC: 4.6 MMOL/L (ref 3.5–5.1)
PROT SERPL-MCNC: 6.6 G/DL (ref 6.4–8.2)
RBC # BLD AUTO: 4.6 M/UL (ref 3.8–5.2)
RBC MORPH BLD: ABNORMAL
SODIUM SERPL-SCNC: 139 MMOL/L (ref 136–145)
TRIGL SERPL-MCNC: 114 MG/DL (ref ?–150)
VLDLC SERPL CALC-MCNC: 22.8 MG/DL
WBC # BLD AUTO: 3.4 K/UL (ref 3.6–11)
WBC MORPH BLD: ABNORMAL

## 2022-08-03 NOTE — PROGRESS NOTES
Labs showed the following concerns:  Showed that she continues to have a low vitamin D. Consider taking vitamin D 1904-9675 international units daily. Cholesterol levels are slightly elevated. Monitor diet and eat low fat, low cholesterol. Her lymphocytes were elevated and neutrophils were low. WE NEED TO REPEAT THIS in 2-WEEKS. I have placed orders.    Other labs were normal.

## 2022-08-03 NOTE — TELEPHONE ENCOUNTER
----- Message from Remington Avery MD sent at 8/3/2022  8:56 AM EDT -----  Labs showed the following concerns:  Showed that she continues to have a low vitamin D. Consider taking vitamin D 5164-0527 international units daily. Cholesterol levels are slightly elevated. Monitor diet and eat low fat, low cholesterol. Her lymphocytes were elevated and neutrophils were low. WE NEED TO REPEAT THIS in 2-WEEKS. I have placed orders.    Other labs were normal.

## 2022-08-22 ENCOUNTER — OFFICE VISIT (OUTPATIENT)
Dept: FAMILY MEDICINE CLINIC | Age: 87
End: 2022-08-22
Payer: MEDICARE

## 2022-08-22 VITALS
BODY MASS INDEX: 24.43 KG/M2 | HEART RATE: 91 BPM | WEIGHT: 152 LBS | TEMPERATURE: 98.2 F | DIASTOLIC BLOOD PRESSURE: 80 MMHG | SYSTOLIC BLOOD PRESSURE: 156 MMHG | HEIGHT: 66 IN | OXYGEN SATURATION: 98 % | RESPIRATION RATE: 20 BRPM

## 2022-08-22 DIAGNOSIS — I10 PRIMARY HYPERTENSION: Primary | ICD-10-CM

## 2022-08-22 PROCEDURE — 1090F PRES/ABSN URINE INCON ASSESS: CPT | Performed by: INTERNAL MEDICINE

## 2022-08-22 PROCEDURE — G8420 CALC BMI NORM PARAMETERS: HCPCS | Performed by: INTERNAL MEDICINE

## 2022-08-22 PROCEDURE — G8427 DOCREV CUR MEDS BY ELIG CLIN: HCPCS | Performed by: INTERNAL MEDICINE

## 2022-08-22 PROCEDURE — 1101F PT FALLS ASSESS-DOCD LE1/YR: CPT | Performed by: INTERNAL MEDICINE

## 2022-08-22 PROCEDURE — G8432 DEP SCR NOT DOC, RNG: HCPCS | Performed by: INTERNAL MEDICINE

## 2022-08-22 PROCEDURE — 1123F ACP DISCUSS/DSCN MKR DOCD: CPT | Performed by: INTERNAL MEDICINE

## 2022-08-22 PROCEDURE — G0463 HOSPITAL OUTPT CLINIC VISIT: HCPCS | Performed by: INTERNAL MEDICINE

## 2022-08-22 PROCEDURE — G8536 NO DOC ELDER MAL SCRN: HCPCS | Performed by: INTERNAL MEDICINE

## 2022-08-22 PROCEDURE — 99213 OFFICE O/P EST LOW 20 MIN: CPT | Performed by: INTERNAL MEDICINE

## 2022-08-22 RX ORDER — LOSARTAN POTASSIUM 100 MG/1
100 TABLET ORAL DAILY
Qty: 90 TABLET | Refills: 1 | Status: SHIPPED | OUTPATIENT
Start: 2022-08-22 | End: 2022-09-21

## 2022-08-22 NOTE — PROGRESS NOTES
Chief Complaint   Patient presents with    Hypertension     Re-check     Results     Go over labs      Identified pt with two pt identifiers(name and ). Health Maintenance Due   Topic    DTaP/Tdap/Td series (1 - Tdap)    Shingrix Vaccine Age 50> (1 of 2)       Wt Readings from Last 3 Encounters:   22 152 lb (68.9 kg)   22 153 lb (69.4 kg)   21 151 lb (68.5 kg)     Temp Readings from Last 3 Encounters:   22 98.2 °F (36.8 °C) (Temporal)   22 97.6 °F (36.4 °C) (Temporal)   21 99.1 °F (37.3 °C) (Temporal)     BP Readings from Last 3 Encounters:   22 (!) 156/80   22 (!) 178/84   21 (!) 190/82     Pulse Readings from Last 3 Encounters:   22 91   22 99   21 91         Learning Assessment:  :     Learning Assessment 2016   PRIMARY LEARNER Patient Patient   HIGHEST LEVEL OF EDUCATION - PRIMARY LEARNER  - GRADUATED HIGH SCHOOL OR GED   BARRIERS PRIMARY LEARNER - NONE   CO-LEARNER CAREGIVER - No   PRIMARY LANGUAGE ENGLISH ENGLISH   LEARNER PREFERENCE PRIMARY DEMONSTRATION DEMONSTRATION     - LISTENING     - READING   ANSWERED BY patient  patient   RELATIONSHIP SELF SELF       Depression Screening:  :     3 most recent PHQ Screens 2022   Little interest or pleasure in doing things Not at all   Feeling down, depressed, irritable, or hopeless Not at all   Total Score PHQ 2 0       Fall Risk Assessment:  :     Fall Risk Assessment, last 12 mths 2022   Able to walk? Yes   Fall in past 12 months? 0   Do you feel unsteady? 1   Are you worried about falling 1   Is the gait abnormal? 1   Number of falls in past 12 months 0       Abuse Screening:  :     Abuse Screening Questionnaire 2022   Do you ever feel afraid of your partner? N N N N   Are you in a relationship with someone who physically or mentally threatens you? N N N N   Is it safe for you to go home?  Suyapa Faulkner       Coordination of Care Questionnaire:  :     1) Have you been to an emergency room, urgent care clinic since your last visit? no   Hospitalized since your last visit? yes  [facility] 8/16/22     2) Have you seen or consulted any other health care providers outside of [address] [place] [address] since your last visit? no  (Include any pap smears or colon screenings in this section.)    3) Do you have an Advance Directive on file? no  Are you interested in receiving information about Advance Directives? no    Patient is accompanied by daughter I have received verbal consent from Alka Benavidez to discuss any/all medical information while they are present in the room. 4.  For patients aged 39-70: Has the patient had a colonoscopy / FIT/ Cologuard? Yes - no Care Gap present      If the patient is female:    5. For patients aged 41-77: Has the patient had a mammogram within the past 2 years? Yes - no Care Gap present      6. For patients aged 21-65: Has the patient had a pap smear?  Yes - no Care Gap present

## 2022-08-22 NOTE — PATIENT INSTRUCTIONS
Adopt a Mediterranean-style lifestyle:  Lots of fresh, locally-grown fruits and vegetables (aim for 7 or more servings a day). Complex carbohydrates like whole grains, nuts, beans and bread (with at least 4 grams of fiber per serving). Avoid the whites - white flour, sugar, white pasta, white rice. Minimally processed foods (5 or fewer listed ingredients on the label). Olive oil as the primary source of fat. Low to moderate amounts of dairy, fish and poultry. Red meat rarely (grass-fed, organic when you do eat it). Low amounts of wine with meals (optional). Unhurried meals with loved ones. Daily exercise (30 - 60 minutes). Resources:   [employer]CDP   http://www.[employer]/health/mediterranean-diet/TI53876     Cookbooks:   Mediterranean [place] by Peggy Jerry   The Best of Recipes for Health by Peggy Jerry   The Sprouted Kitchen by Florina Finley   The Food You Crave by Mayur Munguia   So Easy by Mayur Munguia     Please limit the amount of sodium (salt) in your diet. You should be getting no more than 2300 mg of sodium/salt per day. Remember, if you eat anything that is prepared or comes out of a box, a bag or a can, it has salt in it! Please read labels!

## 2022-08-26 NOTE — PROGRESS NOTES
CC:  Chief Complaint   Patient presents with    Hypertension     Re-check     Results     Go over labs       Assessment/Plan:     Diagnoses and all orders for this visit:    1. Primary hypertension  -     losartan (COZAAR) 100 mg tablet; Take 1 Tablet by mouth daily for 30 days. Will continue with Losartan at this time. No adverse side effects and BP down a little. Patient is also doing well post CTS. I have discussed the diagnosis with the patient and the intended treatment plan as seen in the above orders. The patient has received an after-visit summary and questions were answered concerning future plans. Asked to return should symptoms worsen or not improve with treatment. Any pending labs and studies will be relayed to patient when they become available. Pt verbalizes understanding of plan of care and denies further questions or concerns at this time. Follow-up and Dispositions    Return if symptoms worsen or fail to improve. Subjective:     Epifanio Anna is a 80 y.o. female who presents for follow up of hypertension. Diet and Lifestyle: generally follows a low fat low cholesterol diet, generally follows a low sodium diet  Home BP Monitoring: is not measured at home    Cardiovascular ROS: taking medications as instructed, no medication side effects noted, no TIA's, no chest pain on exertion, no dyspnea on exertion, no swelling of ankles. New concerns: Since her last visit she had carpal tunnel repair of her R-wrist. It is better. Healing scar. She reports that she is taking the Losartan and no worrisome symptoms. She has an appointment to be seen at [facility] to work up possible normal pressure hypertension. Multiple BP medications have been unable to control her BP. Today, it is slightly better than last week.      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 90 Tablet 1    iron [id number] (Multigen Folic) tab capsule TAKE ONE TABLET BY MOUTH ONCE DAILY 90 Tab 1    ASPIRIN/SALICYLAMIDE/CAFFEINE (BC HEADACHE POWDER PO) Take  by mouth as needed. Allergies   Allergen Reactions    Hyzaar [Losartan-Hydrochlorothiazide] Vertigo    Pseudoephedrine Other (comments)     Rapid heart rate.      Naprosyn [Naproxen] Not Reported This Time     Past Medical History:   Diagnosis Date    Anemia     Sees Dr. Mc Morels and is on a prescription multivitamin call Multigen which has corrected her anemia    Headache     Hypertension     Intracranial hypotension 1998    had blood patch to correct    Meningitis 1950 & 2011    Osteochondritis 1975    Snoring      Past Surgical History:   Procedure Laterality Date    HX CATARACT REMOVAL  1997    HX DILATION AND CURETTAGE  1972    HX OTHER SURGICAL  1975    osteochroditis    [address]    blood patch for intercranial spontaneious hypotension      Family History   Problem Relation Age of Onset    Lung Disease Brother         lung cancer    Diabetes Mother     Thyroid Disease Mother         goiter    Diabetes Sister     Heart Disease Sister     Heart Disease Father     Stroke Father     Hypertension Brother     Stroke Brother     Cancer Brother     Parkinson's Disease Sister     Other Sister         brain aneurysm    Alzheimer's Disease Sister      Social History     Tobacco Use    Smoking status: Former    Smokeless tobacco: Never   Substance Use Topics Alcohol use: Yes     Alcohol/week: 1.0 standard drink     Types: 1 Glasses of wine per week         Review of Systems, additional:  Pertinent items are noted in HPI. Objective:     Visit Vitals  BP (!) 156/80 (BP 1 Location: Left upper arm, BP Patient Position: Sitting, BP Cuff Size: Adult)   Pulse 91   Temp 98.2 °F (36.8 °C) (Temporal)   Resp 20   Ht 5' 6\" (1.676 m)   Wt 152 lb (68.9 kg)   SpO2 98%   BMI 24.53 kg/m²     Appearance: alert, well appearing, and in no distress and normal appearing weight. General exam: CVS exam BP noted to be moderately elevated today in office, S1, S2 normal, no gallop, no murmur, chest clear, no JVD, no HSM, no edema, peripheral vascular exam both carotids normal upstroke without bruits, neurological exam alert, oriented, normal speech, no focal findings or movement disorder noted. Lab review: no lab studies available for review at time of visit.     Ryan Richards MD

## 2022-09-13 RX ORDER — IRON ASPGLY/C/B12/FA/CA-TH/SUC 70-150-1MG
TABLET ORAL
Qty: 90 TABLET | Refills: 1 | Status: SHIPPED | OUTPATIENT
Start: 2022-09-13

## 2022-09-16 ENCOUNTER — LAB ONLY (OUTPATIENT)
Dept: FAMILY MEDICINE CLINIC | Age: 87
End: 2022-09-16

## 2022-09-16 DIAGNOSIS — D72.820 ELEVATED LYMPHOCYTE COUNT: ICD-10-CM

## 2022-09-17 LAB
BASOPHILS # BLD: 0 K/UL (ref 0–0.1)
BASOPHILS NFR BLD: 0 % (ref 0–1)
DIFFERENTIAL METHOD BLD: ABNORMAL
EOSINOPHIL # BLD: 0 K/UL (ref 0–0.4)
EOSINOPHIL NFR BLD: 1 % (ref 0–7)
ERYTHROCYTE [DISTWIDTH] IN BLOOD BY AUTOMATED COUNT: 14.4 % (ref 11.5–14.5)
HCT VFR BLD AUTO: 42.3 % (ref 35–47)
HGB BLD-MCNC: 13.6 G/DL (ref 11.5–16)
IMM GRANULOCYTES # BLD AUTO: 0 K/UL
IMM GRANULOCYTES NFR BLD AUTO: 0 %
LYMPHOCYTES # BLD: 1.8 K/UL (ref 0.8–3.5)
LYMPHOCYTES NFR BLD: 47 % (ref 12–49)
MCH RBC QN AUTO: 30 PG (ref 26–34)
MCHC RBC AUTO-ENTMCNC: 32.2 G/DL (ref 30–36.5)
MCV RBC AUTO: 93.2 FL (ref 80–99)
MONOCYTES # BLD: 0.6 K/UL (ref 0–1)
MONOCYTES NFR BLD: 16 % (ref 5–13)
NEUTS SEG # BLD: 1.4 K/UL (ref 1.8–8)
NEUTS SEG NFR BLD: 36 % (ref 32–75)
NRBC # BLD: 0 K/UL (ref 0–0.01)
NRBC BLD-RTO: 0 PER 100 WBC
PLATELET # BLD AUTO: ABNORMAL K/UL (ref 150–400)
RBC # BLD AUTO: 4.54 M/UL (ref 3.8–5.2)
RBC MORPH BLD: ABNORMAL
WBC # BLD AUTO: 3.8 K/UL (ref 3.6–11)

## 2022-09-18 NOTE — PROGRESS NOTES
Repeat CBC showed mostly resolved issues. However, they were unable to adequately assess her platelets, but no abnormal findings. I do not think we need to pursue this. Will continue to monitor. Thanks!

## 2022-09-19 ENCOUNTER — TELEPHONE (OUTPATIENT)
Dept: FAMILY MEDICINE CLINIC | Age: 87
End: 2022-09-19

## 2022-09-19 NOTE — TELEPHONE ENCOUNTER
----- Message from Maia Matamoros MD sent at 9/18/2022  7:02 PM EDT -----  Repeat CBC showed mostly resolved issues. However, they were unable to adequately assess her platelets, but no abnormal findings. I do not think we need to pursue this. Will continue to monitor. Thanks!

## 2022-09-20 ENCOUNTER — VIRTUAL VISIT (OUTPATIENT)
Dept: FAMILY MEDICINE CLINIC | Age: 87
End: 2022-09-20
Payer: MEDICARE

## 2022-09-20 DIAGNOSIS — U07.1 POSITIVE SELF-ADMINISTERED ANTIGEN TEST FOR COVID-19: Primary | ICD-10-CM

## 2022-09-20 LAB
BASOPHILS # BLD AUTO: 0 X10E3/UL (ref 0–0.2)
BASOPHILS NFR BLD AUTO: 1 %
EOSINOPHIL # BLD AUTO: 0 X10E3/UL (ref 0–0.4)
EOSINOPHIL NFR BLD AUTO: 0 %
ERYTHROCYTE [DISTWIDTH] IN BLOOD BY AUTOMATED COUNT: 14 % (ref 11.7–15.4)
HCT VFR BLD AUTO: 38.2 % (ref 34–46.6)
HGB BLD-MCNC: 12.7 G/DL (ref 11.1–15.9)
LYMPHOCYTES # BLD AUTO: 2.1 X10E3/UL (ref 0.7–3.1)
LYMPHOCYTES NFR BLD AUTO: 65 %
MCH RBC QN AUTO: 29.3 PG (ref 26.6–33)
MCHC RBC AUTO-ENTMCNC: 33.2 G/DL (ref 31.5–35.7)
MCV RBC AUTO: 88 FL (ref 79–97)
MONOCYTES # BLD AUTO: 0.2 X10E3/UL (ref 0.1–0.9)
MONOCYTES NFR BLD AUTO: 7 %
MORPHOLOGY BLD-IMP: ABNORMAL
NEUTROPHILS # BLD AUTO: 0.9 X10E3/UL (ref 1.4–7)
NEUTROPHILS NFR BLD AUTO: 27 %
PATH INTERP BLD-IMP: ABNORMAL
PATH REV BLD -IMP: ABNORMAL
PATHOLOGIST NAME: ABNORMAL
PLATELET # BLD AUTO: 171 X10E3/UL (ref 150–450)
RBC # BLD AUTO: 4.33 X10E6/UL (ref 3.77–5.28)
WBC # BLD AUTO: 3.2 X10E3/UL (ref 3.4–10.8)

## 2022-09-20 PROCEDURE — 1123F ACP DISCUSS/DSCN MKR DOCD: CPT | Performed by: INTERNAL MEDICINE

## 2022-09-20 PROCEDURE — G0463 HOSPITAL OUTPT CLINIC VISIT: HCPCS | Performed by: INTERNAL MEDICINE

## 2022-09-20 PROCEDURE — G8536 NO DOC ELDER MAL SCRN: HCPCS | Performed by: INTERNAL MEDICINE

## 2022-09-20 PROCEDURE — G8428 CUR MEDS NOT DOCUMENT: HCPCS | Performed by: INTERNAL MEDICINE

## 2022-09-20 PROCEDURE — G8432 DEP SCR NOT DOC, RNG: HCPCS | Performed by: INTERNAL MEDICINE

## 2022-09-20 PROCEDURE — G8420 CALC BMI NORM PARAMETERS: HCPCS | Performed by: INTERNAL MEDICINE

## 2022-09-20 PROCEDURE — 1090F PRES/ABSN URINE INCON ASSESS: CPT | Performed by: INTERNAL MEDICINE

## 2022-09-20 PROCEDURE — 1101F PT FALLS ASSESS-DOCD LE1/YR: CPT | Performed by: INTERNAL MEDICINE

## 2022-09-20 PROCEDURE — 99213 OFFICE O/P EST LOW 20 MIN: CPT | Performed by: INTERNAL MEDICINE

## 2022-09-20 NOTE — PROGRESS NOTES
Chief Complaint   Patient presents with    Positive For Covid-19     Onset of symptoms Saturday. Positive today. Daughter had [phone] as well. Werner Paknova, who was evaluated through a synchronous (real-time) audio-video encounter. CONSENT: and/or her healthcare decision maker, is aware that it is a billable service, which includes applicable co-pays, with coverage as determined by her insurance carrier. She provided verbal consent to proceed and patient identification was verified. This visit was conducted pursuant to the emergency declaration under the [facility], [address] [place] [address] authority and the [employer] Act. A caregiver was present when appropriate. Ability to conduct physical exam was limited. The patient was located at home in a state where the provider was licensed to provide care. --Bruno Osborne MD on 9/20/2022 at 1:40 PM      Assessment & Plan:   Diagnoses and all orders for this visit:    1. Positive self-administered antigen test for COVID-19  -     nirmatrelvir-ritonavir (Paxlovid, EUA,) 300 mg (150 mg x 2)-100 mg; Take as directed      The complexity of medical decision making for this visit is moderate   I spent at least 15 minutes on this visit with this established patient. We discussed the expected course, resolution and complications of the diagnosis(es) in detail. Medication risks, benefits, costs, interactions, and alternatives were discussed as indicated. I advised her to contact the office if her condition worsens, changes or fails to improve as anticipated. She expressed understanding with the diagnosis(es) and plan. Subjective:   89F who presents with her daughter via this audio-visual visit for positive COVID-19 infection. She started having symptoms 2-days ago and in the middle of last night, had episode of fever and chills. No fever today. O2 94-96%. Feels miserable. Able to eat and drink. Drinking is better than eating. She did have an episode of confusion and slurred words yesterday, but back to baseline today. She denies headaches. Just does not feel great. Will treat with Paxlovid and return and ER precautions discussed in detail. 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 90 Tablet 1    ASPIRIN/SALICYLAMIDE/CAFFEINE (BC HEADACHE POWDER PO) Take  by mouth as needed. Allergies   Allergen Reactions    Hyzaar [Losartan-Hydrochlorothiazide] Vertigo    Pseudoephedrine Other (comments)     Rapid heart rate.      Naprosyn [Naproxen] Not Reported This Time     Past Medical History:   Diagnosis Date    Anemia     Sees Dr. Annetta Tejada and is on a prescription multivitamin call Multigen which has corrected her anemia    Headache     Hypertension     Intracranial hypotension 1998    had blood patch to correct    Meningitis 1950 & 2011    Osteochondritis 1975    Snoring      Past Surgical History:   Procedure Laterality Date    HX CATARACT REMOVAL  1997    HX DILATION AND CURETTAGE  1972    [address]    osteochroditis    [address]    blood patch for intercranial spontaneious hypotension      Family History   Problem Relation Age of Onset    Lung Disease Brother         lung cancer    Diabetes Mother     Thyroid Disease Mother         goiter    Diabetes Sister     Heart Disease Sister     Heart Disease Father     Stroke Father     Hypertension Brother     Stroke Brother     Cancer Brother     Parkinson's Disease Sister     Other Sister         brain aneurysm    Alzheimer's Disease Sister      Social History     Tobacco Use    Smoking status: Former    Smokeless tobacco: Never   Substance Use Topics    Alcohol use: Yes     Alcohol/week: 1.0 standard drink     Types: 1 Glasses of wine per week       Review of Systems   Constitutional:  Positive for chills, fever and malaise/fatigue. HENT:  Positive for congestion. Respiratory:  Positive for cough. Objective: There were no vitals taken for this visit.        General: alert, cooperative, no distress   Mental  status: normal mood, behavior, speech, dress, motor activity, and thought processes, able to follow commands   HENT: NCAT   Neck: no visualized mass   Resp: no respiratory distress   Neuro: no gross deficits   Skin: no discoloration or lesions of concern on visible areas   Psychiatric: normal affect, consistent with stated mood, no evidence of hallucinations

## 2022-09-21 ENCOUNTER — TELEPHONE (OUTPATIENT)
Dept: FAMILY MEDICINE CLINIC | Age: 87
End: 2022-09-21

## 2022-09-21 DIAGNOSIS — R11.2 NAUSEA AND VOMITING, UNSPECIFIED VOMITING TYPE: ICD-10-CM

## 2022-09-21 RX ORDER — ONDANSETRON 8 MG/1
8 TABLET, ORALLY DISINTEGRATING ORAL
Qty: 15 TABLET | Refills: 0 | Status: ON HOLD | OUTPATIENT
Start: 2022-09-21 | End: 2022-09-29

## 2022-09-21 NOTE — TELEPHONE ENCOUNTER
Spoke to pt's daughter and relayed message. She asked if it was ok to stop the Paxlovid and per Dr Teo Ch it is but know her symptoms could get worse and she should be taken to the ER if needed. Daughter understood all directions.

## 2022-09-21 NOTE — TELEPHONE ENCOUNTER
Pts daughter called because her mother cannot keep anything in her stomach because of the paxlovid medication she started on yesterday. She is worried about dehydration because Ya can't keep anything down.  Please call Pts daughter and advise        [phone]

## 2022-09-22 ENCOUNTER — TELEPHONE (OUTPATIENT)
Dept: FAMILY MEDICINE CLINIC | Age: 87
End: 2022-09-22

## 2022-09-22 NOTE — PROGRESS NOTES
Pathologist has reviewed her smears. In general, her labs are normal. She hs a mild decrease in white count which is not a concern. Other findings are completely normal at this time.

## 2022-09-22 NOTE — TELEPHONE ENCOUNTER
----- Message from Mau Lazo MD sent at 9/22/2022  6:35 AM EDT -----  Pathologist has reviewed her smears. In general, her labs are normal. She hs a mild decrease in white count which is not a concern. Other findings are completely normal at this time.

## 2022-09-23 ENCOUNTER — HOSPITAL ENCOUNTER (EMERGENCY)
Age: 87
Discharge: HOME | End: 2022-09-23
Attending: EMERGENCY MEDICINE
Payer: MEDICARE

## 2022-09-23 ENCOUNTER — APPOINTMENT (OUTPATIENT)
Dept: CT IMAGING | Age: 87
End: 2022-09-23
Attending: EMERGENCY MEDICINE
Payer: MEDICARE

## 2022-09-23 VITALS
TEMPERATURE: 98.3 F | WEIGHT: 153 LBS | HEART RATE: 81 BPM | SYSTOLIC BLOOD PRESSURE: 207 MMHG | BODY MASS INDEX: 24.59 KG/M2 | OXYGEN SATURATION: 96 % | RESPIRATION RATE: 16 BRPM | DIASTOLIC BLOOD PRESSURE: 66 MMHG | HEIGHT: 66 IN

## 2022-09-23 DIAGNOSIS — S09.90XA TRAUMATIC INJURY OF HEAD, INITIAL ENCOUNTER: ICD-10-CM

## 2022-09-23 DIAGNOSIS — D72.819 LEUKOPENIA, UNSPECIFIED TYPE: ICD-10-CM

## 2022-09-23 DIAGNOSIS — U09.9 POST-COVID-19 CONDITION: Primary | ICD-10-CM

## 2022-09-23 DIAGNOSIS — R53.83 FATIGUE, UNSPECIFIED TYPE: ICD-10-CM
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
## 2022-09-23 DIAGNOSIS — I10 HYPERTENSION, UNSPECIFIED TYPE: ICD-10-CM

## 2022-09-23 LAB
ALBUMIN SERPL-MCNC: 3.6 G/DL (ref 3.5–5)
ALBUMIN/GLOB SERPL: 1.1 {RATIO} (ref 1.1–2.2)
ALP SERPL-CCNC: 73 U/L (ref 45–117)
ALT SERPL-CCNC: 94 U/L (ref 12–78)
ANION GAP SERPL CALC-SCNC: 6 MMOL/L (ref 5–15)
APPEARANCE UR: CLEAR
AST SERPL-CCNC: 113 U/L (ref 15–37)
BACTERIA URNS QL MICRO: ABNORMAL /HPF
BASOPHILS # BLD: 0 K/UL (ref 0–0.1)
BASOPHILS NFR BLD: 1 % (ref 0–1)
BILIRUB SERPL-MCNC: 0.3 MG/DL (ref 0.2–1)
BILIRUB UR QL: NEGATIVE
BUN SERPL-MCNC: 11 MG/DL (ref 6–20)
BUN/CREAT SERPL: 17 (ref 12–20)
CALCIUM SERPL-MCNC: 9.6 MG/DL (ref 8.5–10.1)
CHLORIDE SERPL-SCNC: 92 MMOL/L (ref 97–108)
CO2 SERPL-SCNC: 30 MMOL/L (ref 21–32)
COLOR UR: ABNORMAL
CREAT SERPL-MCNC: 0.65 MG/DL (ref 0.55–1.02)
DIFFERENTIAL METHOD BLD: ABNORMAL
EOSINOPHIL # BLD: 0 K/UL (ref 0–0.4)
EOSINOPHIL NFR BLD: 0 % (ref 0–7)
EPITH CASTS URNS QL MICRO: ABNORMAL /LPF
ERYTHROCYTE [DISTWIDTH] IN BLOOD BY AUTOMATED COUNT: 13.2 % (ref 11.5–14.5)
GLOBULIN SER CALC-MCNC: 3.4 G/DL (ref 2–4)
GLUCOSE SERPL-MCNC: 129 MG/DL (ref 65–100)
GLUCOSE UR STRIP.AUTO-MCNC: NEGATIVE MG/DL
HCT VFR BLD AUTO: 39.1 % (ref 35–47)
HGB BLD-MCNC: 13.3 G/DL (ref 11.5–16)
HGB UR QL STRIP: ABNORMAL
IMM GRANULOCYTES # BLD AUTO: 0 K/UL (ref 0–0.04)
IMM GRANULOCYTES NFR BLD AUTO: 2 % (ref 0–0.5)
KETONES UR QL STRIP.AUTO: ABNORMAL MG/DL
LEUKOCYTE ESTERASE UR QL STRIP.AUTO: NEGATIVE
LYMPHOCYTES # BLD: 0.7 K/UL (ref 0.8–3.5)
LYMPHOCYTES NFR BLD: 36 % (ref 12–49)
MCH RBC QN AUTO: 28.4 PG (ref 26–34)
MCHC RBC AUTO-ENTMCNC: 34 G/DL (ref 30–36.5)
MCV RBC AUTO: 83.5 FL (ref 80–99)
MONOCYTES # BLD: 0.1 K/UL (ref 0–1)
MONOCYTES NFR BLD: 7 % (ref 5–13)
NEUTS SEG # BLD: 1.1 K/UL (ref 1.8–8)
NEUTS SEG NFR BLD: 54 % (ref 32–75)
NITRITE UR QL STRIP.AUTO: NEGATIVE
NRBC # BLD: 0 K/UL (ref 0–0.01)
NRBC BLD-RTO: 0 PER 100 WBC
PH UR STRIP: 6.5 [PH] (ref 5–8)
PLATELET # BLD AUTO: 134 K/UL (ref 150–400)
PMV BLD AUTO: 9.2 FL (ref 8.9–12.9)
POTASSIUM SERPL-SCNC: 3.4 MMOL/L (ref 3.5–5.1)
PROT SERPL-MCNC: 7 G/DL (ref 6.4–8.2)
PROT UR STRIP-MCNC: ABNORMAL MG/DL
RBC # BLD AUTO: 4.68 M/UL (ref 3.8–5.2)
RBC #/AREA URNS HPF: ABNORMAL /HPF (ref 0–5)
RBC MORPH BLD: ABNORMAL
SODIUM SERPL-SCNC: 128 MMOL/L (ref 136–145)
SP GR UR REFRACTOMETRY: 1.01 (ref 1–1.03)
UROBILINOGEN UR QL STRIP.AUTO: 0.2 EU/DL (ref 0.2–1)
WBC # BLD AUTO: 1.9 K/UL (ref 3.6–11)
WBC URNS QL MICRO: ABNORMAL /HPF (ref 0–4)

## 2022-09-23 PROCEDURE — 96374 THER/PROPH/DIAG INJ IV PUSH: CPT

## 2022-09-23 PROCEDURE — 99284 EMERGENCY DEPT VISIT MOD MDM: CPT

## 2022-09-23 PROCEDURE — 74011250636 HC RX REV CODE- 250/636: Performed by: EMERGENCY MEDICINE

## 2022-09-23 PROCEDURE — 36415 COLL VENOUS BLD VENIPUNCTURE: CPT

## 2022-09-23 PROCEDURE — 70450 CT HEAD/BRAIN W/O DYE: CPT

## 2022-09-23 PROCEDURE — 93005 ELECTROCARDIOGRAM TRACING: CPT

## 2022-09-23 PROCEDURE — 81001 URINALYSIS AUTO W/SCOPE: CPT

## 2022-09-23 PROCEDURE — 85025 COMPLETE CBC W/AUTO DIFF WBC: CPT

## 2022-09-23 PROCEDURE — 72125 CT NECK SPINE W/O DYE: CPT

## 2022-09-23 PROCEDURE — 80053 COMPREHEN METABOLIC PANEL: CPT

## 2022-09-23 PROCEDURE — 96361 HYDRATE IV INFUSION ADD-ON: CPT

## 2022-09-23 RX ORDER — HYDRALAZINE HYDROCHLORIDE 20 MG/ML
10 INJECTION INTRAMUSCULAR; INTRAVENOUS ONCE
Status: COMPLETED | OUTPATIENT
Start: 2022-09-23 | End: 2022-09-23

## 2022-09-23 RX ADMIN — HYDRALAZINE HYDROCHLORIDE 10 MG: 20 INJECTION INTRAMUSCULAR; INTRAVENOUS at 19:53

## 2022-09-23 RX ADMIN — SODIUM CHLORIDE 1000 ML: 9 INJECTION, SOLUTION INTRAVENOUS at 19:53

## 2022-09-23 NOTE — ED PROVIDER NOTES
6:54 PM  I have just evaluated the patient. I have reviewed Her vital signs and determined there is currently no worsening in their condition or physical exam. I have talked with the patient and the family and advised them that I am the provider in triage and have ordered lab work, x rays and other diagnostic tests. I have advised them that we will try and get them to the back as soon as possible. I have also advised them that should they have a worsening condition or any problems before they are sent back to the main ED, to contact me or the triage nurse.       Joan Avila, DO

## 2022-09-23 NOTE — ED TRIAGE NOTES
Arrived via EMS after GLF at home with c/o left sided posterior head pain. Patient states she was trying to walk and \"I lost my balance and fell back and hit the door frame. \" Denies LOC or nausea or vomiting.  Reports she recently tested positive for covid and has been feeling weaker than normal.

## 2022-09-23 NOTE — ED PROVIDER NOTES
Arrived via EMS after GLF at home with c/o left sided posterior head pain. Patient states she was trying to walk and \"I lost my balance and fell back and hit the door frame. \" Denies LOC or nausea or vomiting. Reports she recently tested positive for covid and has been feeling weaker than normal.    70-year-old female presenting ER after having a ground-level fall. Patient recently had COVID earlier in the week. Reports having decreased appetite and feeling slightly weaker than her normal.  Denies any fevers or chills. No nausea or vomiting. Just reports loss of taste and loss of interest in eating. Was walking today and when she lost her balance falling and hitting her butt and the side of her head. Denies any loss of consciousness. Denies any blood thinners. Patient denied any preceding symptoms of chest pain shortness of breath or diaphoresis. No pain with urination no abdominal pain neck pain or back pain or other pain in extremities. Patient was able to ambulate after the fall without any pain. Patient does have history of elevated blood pressure but reports having whitecoat syndrome blood pressure is significantly higher when she is seen at doctor than it is at home. Does take her blood pressure meds and has taken them today.              Past Medical History:   Diagnosis Date    Anemia     Sees Dr. Karla Godoy and is on a prescription multivitamin call Multigen which has corrected her anemia    Headache     Hypertension     Intracranial hypotension 1998    had blood patch to correct    Meningitis 1950 & 2011    Osteochondritis 1975    Snoring        Past Surgical History:   Procedure Laterality Date    HX CATARACT REMOVAL  1997    HX DILATION AND CURETTAGE  1972    HX OTHER SURGICAL  1975    osteochroditis    HX OTHER SURGICAL  1998    blood patch for intercranial spontaneious hypotension          Family History:   Problem Relation Age of Onset    Lung Disease Brother         lung cancer Diabetes Mother     Thyroid Disease Mother         goiter    Diabetes Sister     Heart Disease Sister     Heart Disease Father     Stroke Father     Hypertension Brother     Stroke Brother     Cancer Brother     Parkinson's Disease Sister     Other Sister         brain aneurysm    Alzheimer's Disease Sister        Social History     Socioeconomic History    Marital status:      Spouse name: Not on file    Number of children: Not on file    Years of education: Not on file    Highest education level: Not on file   Occupational History    Not on file   Tobacco Use    Smoking status: Former    Smokeless tobacco: Never   Substance and Sexual Activity    Alcohol use: Yes     Alcohol/week: 1.0 standard drink     Types: 1 Glasses of wine per week    Drug use: No    Sexual activity: Never   Other Topics Concern    Not on file   Social History Narrative    Not on file     Social Determinants of Health     Financial Resource Strain: Not on file   Food Insecurity: Not on file   Transportation Needs: Not on file   Physical Activity: Not on file   Stress: Not on file   Social Connections: Not on file   Intimate Partner Violence: Not on file   Housing Stability: Not on file         ALLERGIES: Hyzaar [losartan-hydrochlorothiazide], Pseudoephedrine, and Naprosyn [naproxen]    Review of Systems   Constitutional:  Positive for activity change, appetite change and fatigue. Negative for chills and fever. HENT:  Positive for congestion. Negative for sore throat. Eyes:  Negative for pain. Respiratory:  Negative for shortness of breath. Cardiovascular:  Negative for chest pain. Gastrointestinal:  Negative for abdominal pain, diarrhea, nausea and vomiting. Genitourinary:  Negative for dysuria and flank pain. Musculoskeletal:  Negative for back pain and neck pain. Skin:  Negative for rash. Neurological:  Negative for dizziness and headaches. All other systems reviewed and are negative.     Vitals:    09/23/22 1845 09/23/22 1846 09/23/22 1850 09/23/22 1852   BP: (!) 240/81  (!) 253/89    Pulse:   95    Resp:   16    Temp:   98.3 °F (36.8 °C)    SpO2: 95% 95% 96%    Weight:    69.4 kg (153 lb)   Height:    5' 6\" (1.676 m)            Physical Exam  Constitutional:       Appearance: She is well-developed. HENT:      Head: Normocephalic and atraumatic. Nose: Nose normal.      Mouth/Throat:      Mouth: Mucous membranes are moist.   Eyes:      Extraocular Movements: Extraocular movements intact. Conjunctiva/sclera: Conjunctivae normal.      Pupils: Pupils are equal, round, and reactive to light. Cardiovascular:      Rate and Rhythm: Normal rate and regular rhythm. Pulmonary:      Effort: Pulmonary effort is normal. No respiratory distress. Breath sounds: Normal breath sounds. Abdominal:      General: Bowel sounds are normal.      Palpations: Abdomen is soft. Tenderness: There is no abdominal tenderness. Musculoskeletal:         General: Normal range of motion. Cervical back: Normal range of motion and neck supple. Skin:     General: Skin is warm. Capillary Refill: Capillary refill takes less than 2 seconds. Findings: No rash. Neurological:      General: No focal deficit present. Mental Status: She is alert and oriented to person, place, and time. Cranial Nerves: No cranial nerve deficit. Sensory: No sensory deficit. Motor: No weakness. Comments: No gross motor or sensory deficits        MDM  Number of Diagnoses or Management Options  Fall, initial encounter  Fatigue, unspecified type  Hypertension, unspecified type  Leukopenia, unspecified type  Post-COVID-19 condition  Traumatic injury of head, initial encounter  Diagnosis management comments: Patient with recent [place] now having decreased appetite. Today fell and lost her balance. Imaging of the head and neck with no significant findings. Patient symptoms improved after receiving IV fluids.   No significant lab abnormalities. EKG unremarkable and urine no signs of infection. I discussed the importance of oral hydration at home. Patient did have leukopenia without neutropenia. Possible postviral in nature. Amount and/or Complexity of Data Reviewed  Clinical lab tests: reviewed  Tests in the radiology section of CPT®: reviewed  Tests in the medicine section of CPT®: reviewed  Decide to obtain previous medical records or to obtain history from someone other than the patient: yes      ED Course as of 09/23/22 2333   Fri Sep 23, 2022   2000 EKG normal sinus rhythm rate of 87 bpm with left axis deviation and right bundle branch block pattern. No ST elevation or depressions. EKG interpreted by Nadeen Alvarez MD   [ZD]      ED Course User Index  [ZD] Bashir Carroll MD       Procedures               Recent Results (from the past 24 hour(s))   CBC WITH AUTOMATED DIFF    Collection Time: 09/23/22  7:23 PM   Result Value Ref Range    WBC 1.9 (L) 3.6 - 11.0 K/uL    RBC 4.68 3.80 - 5.20 M/uL    HGB 13.3 11.5 - 16.0 g/dL    HCT 39.1 35.0 - 47.0 %    MCV 83.5 80.0 - 99.0 FL    MCH 28.4 26.0 - 34.0 PG    MCHC 34.0 30.0 - 36.5 g/dL    RDW 13.2 11.5 - 14.5 %    PLATELET 268 (L) 484 - 400 K/uL    MPV 9.2 8.9 - 12.9 FL    NRBC 0.0 0.0  WBC    ABSOLUTE NRBC 0.00 0.00 - 0.01 K/uL    NEUTROPHILS 54 32 - 75 %    LYMPHOCYTES 36 12 - 49 %    MONOCYTES 7 5 - 13 %    EOSINOPHILS 0 0 - 7 %    BASOPHILS 1 0 - 1 %    IMMATURE GRANULOCYTES 2 (H) 0 - 0.5 %    ABS. NEUTROPHILS 1.1 (L) 1.8 - 8.0 K/UL    ABS. LYMPHOCYTES 0.7 (L) 0.8 - 3.5 K/UL    ABS. MONOCYTES 0.1 0.0 - 1.0 K/UL    ABS. EOSINOPHILS 0.0 0.0 - 0.4 K/UL    ABS. BASOPHILS 0.0 0.0 - 0.1 K/UL    ABS. IMM.  GRANS. 0.0 0.00 - 0.04 K/UL    DF SMEAR SCANNED      RBC COMMENTS NORMOCYTIC, NORMOCHROMIC     METABOLIC PANEL, COMPREHENSIVE    Collection Time: 09/23/22  7:23 PM   Result Value Ref Range    Sodium 128 (L) 136 - 145 mmol/L    Potassium 3.4 (L) 3.5 - 5.1 mmol/L    Chloride 92 (L) 97 - 108 mmol/L    CO2 30 21 - 32 mmol/L    Anion gap 6 5 - 15 mmol/L    Glucose 129 (H) 65 - 100 mg/dL    BUN 11 6 - 20 MG/DL    Creatinine 0.65 0.55 - 1.02 MG/DL    BUN/Creatinine ratio 17 12 - 20      GFR est AA >60 >60 ml/min/1.73m2    GFR est non-AA >60 >60 ml/min/1.73m2    Calcium 9.6 8.5 - 10.1 MG/DL    Bilirubin, total 0.3 0.2 - 1.0 MG/DL    ALT (SGPT) 94 (H) 12 - 78 U/L    AST (SGOT) 113 (H) 15 - 37 U/L    Alk.  phosphatase 73 45 - 117 U/L    Protein, total 7.0 6.4 - 8.2 g/dL    Albumin 3.6 3.5 - 5.0 g/dL    Globulin 3.4 2.0 - 4.0 g/dL    A-G Ratio 1.1 1.1 - 2.2     URINALYSIS W/ RFLX MICROSCOPIC    Collection Time: 09/23/22  7:23 PM   Result Value Ref Range    Color YELLOW/STRAW      Appearance CLEAR CLEAR      Specific gravity 1.010 1.003 - 1.030      pH (UA) 6.5 5.0 - 8.0      Protein TRACE (A) NEG mg/dL    Glucose Negative NEG mg/dL    Ketone TRACE (A) NEG mg/dL    Bilirubin Negative NEG      Blood SMALL (A) NEG      Urobilinogen 0.2 0.2 - 1.0 EU/dL    Nitrites Negative NEG      Leukocyte Esterase Negative NEG     URINE MICROSCOPIC ONLY    Collection Time: 09/23/22  7:23 PM   Result Value Ref Range    WBC 0-4 0 - 4 /hpf    RBC 0-5 0 - 5 /hpf    Epithelial cells FEW FEW /lpf    Bacteria 1+ (A) NEG /hpf   EKG, 12 LEAD, INITIAL    Collection Time: 09/23/22  7:52 PM   Result Value Ref Range    Ventricular Rate 87 BPM    Atrial Rate 87 BPM    P-R Interval 178 ms    QRS Duration 130 ms    Q-T Interval 412 ms    QTC Calculation (Bezet) 495 ms    Calculated P Axis 45 degrees    Calculated R Axis -36 degrees    Calculated T Axis 19 degrees    Diagnosis       Normal sinus rhythm  Possible Left atrial enlargement  Left axis deviation  Right bundle branch block  Abnormal ECG  When compared with ECG of 03-APR-2007 12:02,  Right bundle branch block is now present         CT HEAD WO CONT    Result Date: 9/23/2022  Indication:  fall, head strike Comparison: CT March 2012 Findings: 5 mm axial images were obtained from the skull base through the vertex. CT dose reduction was achieved through the use of a standardized protocol tailored for this examination and automatic exposure control for dose modulation. The ventricles and cortical sulci are prominent, compatible with age related volume loss. There is no evidence of intracranial hemorrhage, mass, mass effect, or acute infarct. There is periventricular white matter disease. No extra-axial fluid collections are seen. The visualized paranasal sinuses and mastoid air cells are clear. The orbital structures are unremarkable. No osseous abnormalities are seen. 1. No evidence of acute infarct or intracranial hemorrhage. 2. Periventricular white matter disease is likely secondary to chronic small vessel ischemic changes. CT SPINE CERV WO CONT    Result Date: 9/23/2022  INDICATION:   fall, head strike COMPARISON: None. TECHNIQUE:   Noncontrast axial CT imaging of the cervical spine was performed. Coronal and sagittal reconstructions were obtained. CT dose reduction was achieved through the use of a standardized protocol tailored for this examination and automatic exposure control for dose modulation. FINDINGS: There is no evidence of acute osseous abnormality. There is no acute alignment abnormality. Vertebral body heights are maintained. There is no appreciable prevertebral soft tissue swelling or epidural hematoma. There is multilevel degenerative spondylosis. There is multilevel bilateral neuroforaminal stenosis. Evaluation of the paraspinal soft tissues demonstrates no significant pathology. The visualized lung apices are clear. 1.  No acute osseous abnormality. 2. Multilevel degenerative spondylosis.

## 2022-09-24 NOTE — ED NOTES
Pt ambulated to BR with one person assist. Pt has discharge pending, per Dr Deedee Lundborg, pt to get all of IVF prior to discharge.

## 2022-09-24 NOTE — ED NOTES
Pt discharged home with daughter, verbalized understanding of discharge instructions, no prescriptions given, pt requested to be wheeled out of dept d/t weakness and length of walk.

## 2022-09-25 LAB
ATRIAL RATE: 87 BPM
CALCULATED P AXIS, ECG09: 45 DEGREES
CALCULATED R AXIS, ECG10: -36 DEGREES
CALCULATED T AXIS, ECG11: 19 DEGREES
DIAGNOSIS, 93000: NORMAL
P-R INTERVAL, ECG05: 178 MS
Q-T INTERVAL, ECG07: 412 MS
QRS DURATION, ECG06: 130 MS
QTC CALCULATION (BEZET), ECG08: 495 MS
VENTRICULAR RATE, ECG03: 87 BPM

## 2022-09-28 ENCOUNTER — HOSPITAL ENCOUNTER (INPATIENT)
Age: 87
LOS: 6 days | Discharge: HOME HEALTH | DRG: 515 | End: 2022-10-04
Attending: EMERGENCY MEDICINE | Admitting: FAMILY MEDICINE
Payer: MEDICARE

## 2022-09-28 ENCOUNTER — APPOINTMENT (OUTPATIENT)
Dept: CT IMAGING | Age: 87
DRG: 515 | End: 2022-09-28
Attending: EMERGENCY MEDICINE
Payer: MEDICARE

## 2022-09-28 ENCOUNTER — APPOINTMENT (OUTPATIENT)
Dept: MRI IMAGING | Age: 87
DRG: 515 | End: 2022-09-28
Attending: EMERGENCY MEDICINE
Payer: MEDICARE

## 2022-09-28 DIAGNOSIS — S32.010A CLOSED COMPRESSION FRACTURE OF BODY OF L1 VERTEBRA (HCC): ICD-10-CM

## 2022-09-28 DIAGNOSIS — U07.1 COVID-19: ICD-10-CM

## 2022-09-28 DIAGNOSIS — I16.0 HYPERTENSIVE URGENCY: Primary | ICD-10-CM

## 2022-09-28 LAB
ALBUMIN SERPL-MCNC: 3 G/DL (ref 3.5–5)
ALBUMIN/GLOB SERPL: 0.9 {RATIO} (ref 1.1–2.2)
ALP SERPL-CCNC: 63 U/L (ref 45–117)
ALT SERPL-CCNC: 43 U/L (ref 12–78)
ANION GAP SERPL CALC-SCNC: 4 MMOL/L (ref 5–15)
APPEARANCE UR: CLEAR
AST SERPL-CCNC: 40 U/L (ref 15–37)
BACTERIA URNS QL MICRO: NEGATIVE /HPF
BASOPHILS # BLD: 0 K/UL (ref 0–0.1)
BASOPHILS NFR BLD: 0 % (ref 0–1)
BILIRUB SERPL-MCNC: 0.6 MG/DL (ref 0.2–1)
BILIRUB UR QL: NEGATIVE
BUN SERPL-MCNC: 5 MG/DL (ref 6–20)
BUN/CREAT SERPL: 9 (ref 12–20)
CALCIUM SERPL-MCNC: 9.5 MG/DL (ref 8.5–10.1)
CHLORIDE SERPL-SCNC: 105 MMOL/L (ref 97–108)
CO2 SERPL-SCNC: 30 MMOL/L (ref 21–32)
COLOR UR: ABNORMAL
COMMENT, HOLDF: NORMAL
CREAT SERPL-MCNC: 0.53 MG/DL (ref 0.55–1.02)
DIFFERENTIAL METHOD BLD: ABNORMAL
EOSINOPHIL # BLD: 0 K/UL (ref 0–0.4)
EOSINOPHIL NFR BLD: 0 % (ref 0–7)
EPITH CASTS URNS QL MICRO: ABNORMAL /LPF
ERYTHROCYTE [DISTWIDTH] IN BLOOD BY AUTOMATED COUNT: 13.7 % (ref 11.5–14.5)
GLOBULIN SER CALC-MCNC: 3.5 G/DL (ref 2–4)
GLUCOSE SERPL-MCNC: 113 MG/DL (ref 65–100)
GLUCOSE UR STRIP.AUTO-MCNC: NEGATIVE MG/DL
HCT VFR BLD AUTO: 36.5 % (ref 35–47)
HGB BLD-MCNC: 12.3 G/DL (ref 11.5–16)
HGB UR QL STRIP: NEGATIVE
HYALINE CASTS URNS QL MICRO: ABNORMAL /LPF (ref 0–2)
IMM GRANULOCYTES # BLD AUTO: 0 K/UL (ref 0–0.04)
IMM GRANULOCYTES NFR BLD AUTO: 0 % (ref 0–0.5)
KETONES UR QL STRIP.AUTO: NEGATIVE MG/DL
LEUKOCYTE ESTERASE UR QL STRIP.AUTO: NEGATIVE
LYMPHOCYTES # BLD: 0.6 K/UL (ref 0.8–3.5)
LYMPHOCYTES NFR BLD: 25 % (ref 12–49)
MCH RBC QN AUTO: 29.6 PG (ref 26–34)
MCHC RBC AUTO-ENTMCNC: 33.7 G/DL (ref 30–36.5)
MCV RBC AUTO: 87.7 FL (ref 80–99)
METAMYELOCYTES NFR BLD MANUAL: 2 %
MONOCYTES # BLD: 0.2 K/UL (ref 0–1)
MONOCYTES NFR BLD: 10 % (ref 5–13)
MYELOCYTES NFR BLD MANUAL: 2 %
NEUTS SEG # BLD: 1.4 K/UL (ref 1.8–8)
NEUTS SEG NFR BLD: 61 % (ref 32–75)
NITRITE UR QL STRIP.AUTO: NEGATIVE
NRBC # BLD: 0 K/UL (ref 0–0.01)
NRBC BLD-RTO: 0 PER 100 WBC
PH UR STRIP: 7.5 [PH] (ref 5–8)
PLATELET # BLD AUTO: 161 K/UL (ref 150–400)
PMV BLD AUTO: 9.4 FL (ref 8.9–12.9)
POTASSIUM SERPL-SCNC: 3.9 MMOL/L (ref 3.5–5.1)
PROT SERPL-MCNC: 6.5 G/DL (ref 6.4–8.2)
PROT UR STRIP-MCNC: ABNORMAL MG/DL
RBC # BLD AUTO: 4.16 M/UL (ref 3.8–5.2)
RBC #/AREA URNS HPF: ABNORMAL /HPF (ref 0–5)
RBC MORPH BLD: ABNORMAL
SAMPLES BEING HELD,HOLD: NORMAL
SODIUM SERPL-SCNC: 139 MMOL/L (ref 136–145)
SP GR UR REFRACTOMETRY: 1 (ref 1–1.03)
UR CULT HOLD, URHOLD: NORMAL
UROBILINOGEN UR QL STRIP.AUTO: 0.2 EU/DL (ref 0.2–1)
WBC # BLD AUTO: 2.3 K/UL (ref 3.6–11)
WBC URNS QL MICRO: ABNORMAL /HPF (ref 0–4)

## 2022-09-28 PROCEDURE — 74011250636 HC RX REV CODE- 250/636: Performed by: EMERGENCY MEDICINE

## 2022-09-28 PROCEDURE — 96374 THER/PROPH/DIAG INJ IV PUSH: CPT

## 2022-09-28 PROCEDURE — 71275 CT ANGIOGRAPHY CHEST: CPT

## 2022-09-28 PROCEDURE — 99285 EMERGENCY DEPT VISIT HI MDM: CPT

## 2022-09-28 PROCEDURE — 74011000250 HC RX REV CODE- 250: Performed by: STUDENT IN AN ORGANIZED HEALTH CARE EDUCATION/TRAINING PROGRAM

## 2022-09-28 PROCEDURE — 74011250636 HC RX REV CODE- 250/636: Performed by: STUDENT IN AN ORGANIZED HEALTH CARE EDUCATION/TRAINING PROGRAM

## 2022-09-28 PROCEDURE — 74011250637 HC RX REV CODE- 250/637: Performed by: STUDENT IN AN ORGANIZED HEALTH CARE EDUCATION/TRAINING PROGRAM

## 2022-09-28 PROCEDURE — 36415 COLL VENOUS BLD VENIPUNCTURE: CPT

## 2022-09-28 PROCEDURE — 85025 COMPLETE CBC W/AUTO DIFF WBC: CPT

## 2022-09-28 PROCEDURE — 65270000046 HC RM TELEMETRY

## 2022-09-28 PROCEDURE — 74011000250 HC RX REV CODE- 250: Performed by: EMERGENCY MEDICINE

## 2022-09-28 PROCEDURE — 81001 URINALYSIS AUTO W/SCOPE: CPT

## 2022-09-28 PROCEDURE — 74011000636 HC RX REV CODE- 636: Performed by: EMERGENCY MEDICINE

## 2022-09-28 PROCEDURE — 93005 ELECTROCARDIOGRAM TRACING: CPT

## 2022-09-28 PROCEDURE — 77030038269 HC DRN EXT URIN PURWCK BARD -A

## 2022-09-28 PROCEDURE — 72158 MRI LUMBAR SPINE W/O & W/DYE: CPT

## 2022-09-28 PROCEDURE — A9576 INJ PROHANCE MULTIPACK: HCPCS | Performed by: RADIOLOGY

## 2022-09-28 PROCEDURE — 2709999900 HC NON-CHARGEABLE SUPPLY

## 2022-09-28 PROCEDURE — 74011250636 HC RX REV CODE- 250/636: Performed by: RADIOLOGY

## 2022-09-28 PROCEDURE — 80053 COMPREHEN METABOLIC PANEL: CPT

## 2022-09-28 PROCEDURE — 77030020847 HC STATLOK BARD -A

## 2022-09-28 RX ORDER — ACETAMINOPHEN 650 MG/1
650 SUPPOSITORY RECTAL
Status: DISCONTINUED | OUTPATIENT
Start: 2022-09-28 | End: 2022-10-04 | Stop reason: HOSPADM

## 2022-09-28 RX ORDER — MORPHINE SULFATE 2 MG/ML
2 INJECTION, SOLUTION INTRAMUSCULAR; INTRAVENOUS
Status: COMPLETED | OUTPATIENT
Start: 2022-09-28 | End: 2022-09-28

## 2022-09-28 RX ORDER — LABETALOL HCL 20 MG/4 ML
10 SYRINGE (ML) INTRAVENOUS
Status: DISCONTINUED | OUTPATIENT
Start: 2022-09-28 | End: 2022-09-29

## 2022-09-28 RX ORDER — LIDOCAINE 4 G/100G
1 PATCH TOPICAL EVERY 24 HOURS
Status: DISCONTINUED | OUTPATIENT
Start: 2022-09-28 | End: 2022-10-01

## 2022-09-28 RX ORDER — MULTIVITAMIN
1 TABLET ORAL DAILY
Status: DISCONTINUED | OUTPATIENT
Start: 2022-09-28 | End: 2022-09-29

## 2022-09-28 RX ORDER — SODIUM CHLORIDE 0.9 % (FLUSH) 0.9 %
5-40 SYRINGE (ML) INJECTION EVERY 8 HOURS
Status: DISCONTINUED | OUTPATIENT
Start: 2022-09-28 | End: 2022-10-04 | Stop reason: HOSPADM

## 2022-09-28 RX ORDER — MORPHINE SULFATE 2 MG/ML
2 INJECTION, SOLUTION INTRAMUSCULAR; INTRAVENOUS
Status: DISCONTINUED | OUTPATIENT
Start: 2022-09-28 | End: 2022-09-29

## 2022-09-28 RX ORDER — LOSARTAN POTASSIUM 50 MG/1
100 TABLET ORAL DAILY
Status: DISCONTINUED | OUTPATIENT
Start: 2022-09-28 | End: 2022-09-29

## 2022-09-28 RX ORDER — SODIUM CHLORIDE 0.9 % (FLUSH) 0.9 %
5-40 SYRINGE (ML) INJECTION AS NEEDED
Status: DISCONTINUED | OUTPATIENT
Start: 2022-09-28 | End: 2022-10-04 | Stop reason: HOSPADM

## 2022-09-28 RX ORDER — LABETALOL HYDROCHLORIDE 5 MG/ML
10 INJECTION, SOLUTION INTRAVENOUS
Status: COMPLETED | OUTPATIENT
Start: 2022-09-28 | End: 2022-09-28

## 2022-09-28 RX ORDER — SODIUM CHLORIDE 9 MG/ML
100 INJECTION, SOLUTION INTRAVENOUS CONTINUOUS
Status: DISCONTINUED | OUTPATIENT
Start: 2022-09-29 | End: 2022-09-30

## 2022-09-28 RX ORDER — ACETAMINOPHEN 325 MG/1
650 TABLET ORAL
Status: DISCONTINUED | OUTPATIENT
Start: 2022-09-28 | End: 2022-10-04 | Stop reason: HOSPADM

## 2022-09-28 RX ADMIN — LABETALOL HYDROCHLORIDE 10 MG: 5 INJECTION, SOLUTION INTRAVENOUS at 20:12

## 2022-09-28 RX ADMIN — GADOTERIDOL 13 ML: 279.3 INJECTION, SOLUTION INTRAVENOUS at 17:30

## 2022-09-28 RX ADMIN — LABETALOL HYDROCHLORIDE 10 MG: 5 INJECTION, SOLUTION INTRAVENOUS at 15:21

## 2022-09-28 RX ADMIN — MORPHINE SULFATE 2 MG: 2 INJECTION, SOLUTION INTRAMUSCULAR; INTRAVENOUS at 22:28

## 2022-09-28 RX ADMIN — Medication 10 ML: at 22:36

## 2022-09-28 RX ADMIN — ACETAMINOPHEN 650 MG: 325 TABLET ORAL at 19:54

## 2022-09-28 RX ADMIN — IOPAMIDOL 100 ML: 755 INJECTION, SOLUTION INTRAVENOUS at 10:39

## 2022-09-28 RX ADMIN — LABETALOL HYDROCHLORIDE 10 MG: 5 INJECTION INTRAVENOUS at 10:15

## 2022-09-28 RX ADMIN — MORPHINE SULFATE 2 MG: 2 INJECTION, SOLUTION INTRAMUSCULAR; INTRAVENOUS at 13:12

## 2022-09-28 RX ADMIN — LOSARTAN POTASSIUM 100 MG: 50 TABLET, FILM COATED ORAL at 22:28

## 2022-09-28 NOTE — H&P
[facility]   Admission H&P    Date of admission: 9/28/2022    Patient name: Seun Agarwal  MRN: [id number]  YOB: 1931  Age: 80 y.o. Primary care provider:  Delbert Bloom MD     Source of Information: patient, medical records    Chief complaint: back pain after a GLF. History of Present Illness  Seun Agarwal is a 80 y.o. female with a PMH of HTN, recent Covid infection (9/20) who presents to the ED complaining of back pain. History provided by patient and pt's daughter. Pt mentioned that she suffered a GLF on Friday 9/23 while she was going to the bathroom without using her can or walker, she felt like she lost her balance and felt back and hit the door frame, this fall was witnessed by her daughter, she did not LOC, however she was having head pain and was taken to the ER for evaluation on same day. They did CT head which was negative. The following day she started having mild lower back pain which was progressively worsening. Pain is dull, 7/10 in intensity, no radiate, constant, worse with movement, better with rest and staying still. She has been taking OTC pain medication w/o relief. Today daughter mentioned she was crying due to back pain and she decided to bring her to the ED. Patient denies fever, chills, SOB, CP, abdominal pain. Of note, pt tested positive for Covid on 9/20. She was having UR sx by that time. Received Paxlovid which caused her to have diarrhea and she decided to stop after the first dose. Sx has resolved for now. COVID Questions:   Experiencing any of the following symptoms: fever, chills, cough, SOB, diarrhea, URI symptoms. NO  Any Sick contacts fever, cough, diarrhea, SOB, URI symptoms. NO  Traveled out of state or out of country. NO    In the ED:   Vitals: T: 98.0 HR: 101 BP: 239/109 RR: 20 O2Sat: 95% on RA  Labs: WBC: 2.3 (3-4) BMP unremarkable. UA neg. AST: 40.   Imaging: CTA C/A/P: 1.   No evidence of aortic dissection. 2.  Vascular disease as described. 3.  L1 compression fracture with patchy sclerosis. This is age-indeterminate. Given the patchy sclerosis, pathologic fracture is not excluded. MRI L-Spine: Compression deformity without significant canal compromise L1.  2. Edema in the superior endplate of L4. 3. Severe spinal canal stenosis L3 L5. EKG: NS. VR: 93. Qtc: 497. No ST depression or elevation. Treatment:    Review of Systems  Review of Systems   Constitutional:  Negative for fever. HENT:  Negative for congestion and nosebleeds. Eyes:  Negative for blurred vision, double vision and photophobia. Respiratory:  Negative for sputum production and shortness of breath. Cardiovascular:  Negative for chest pain, palpitations, claudication and leg swelling. Gastrointestinal:  Negative for abdominal pain, blood in stool and diarrhea. Genitourinary:  Negative for dysuria and hematuria. Musculoskeletal:  Positive for back pain. Negative for neck pain. Neurological:  Negative for dizziness, tremors, focal weakness, weakness and headaches. Endo/Heme/Allergies:  Negative for environmental allergies. Psychiatric/Behavioral:  The patient is not nervous/anxious. Home Medications   Prior to Admission medications    Medication Sig Start Date End Date Taking? Authorizing Provider   ondansetron (ZOFRAN ODT) 8 mg disintegrating tablet Take 1 Tablet by mouth every eight (8) hours as needed for Nausea or Vomiting, Nausea or Vomiting. 9/21/22   Delbert Bloom MD   nirmatrelvir-ritonavir (Paxlovid, EUA,) 300 mg (150 mg x 2)-100 mg Take as directed 9/20/22   Delbert Bloom MD   iron [id number] (Multigen Folic) tab capsule TAKE 1 TABLET BY MOUTH EVERY DAY 9/13/22   Delbert Bloom MD   ASPIRIN/SALICYLAMIDE/CAFFEINE (BC HEADACHE POWDER PO) Take  by mouth as needed.     Provider, Historical       Allergies   Allergies   Allergen Reactions    Hyzaar [Losartan-Hydrochlorothiazide] Vertigo    Pseudoephedrine Other (comments)     Rapid heart rate. Naprosyn [Naproxen] Not Reported This Time       Past Medical History:   Diagnosis Date    Anemia     Sees Dr. Diana Prajapati and is on a prescription multivitamin call Multigen which has corrected her anemia    Headache     Hypertension     Intracranial hypotension 1998    had blood patch to correct    Meningitis 1950 & 2011    Osteochondritis 1975    Snoring        Past Surgical History:   Procedure Laterality Date    HX CATARACT REMOVAL  1997    HX DILATION AND CURETTAGE  1972    HX OTHER SURGICAL  1975    osteochroditis    [address]    blood patch for intercranial spontaneious hypotension        Family History   Problem Relation Age of Onset    Lung Disease Brother         lung cancer    Diabetes Mother     Thyroid Disease Mother         goiter    Diabetes Sister     Heart Disease Sister     Heart Disease Father     Stroke Father     Hypertension Brother     Stroke Brother     Cancer Brother     Parkinson's Disease Sister     Other Sister         brain aneurysm    Alzheimer's Disease Sister        Social History   Patient resides    Independently    x  With family care      Assisted living      SNF      Ambulates    Independently    x  With cane     x  Assisted walker      Alcohol history   x  None     Social     Chronic     Smoking history  x  None     Former smoker     Current smoker     Social History     Tobacco Use   Smoking Status Former   Smokeless Tobacco Never       Drug history  x  None     Former drug user     Current drug user     Code status  x  Full code     DNR/DNI     Partial    Code status discussed with the patient/caregivers. Physical Exam  Visit Vitals  BP (!) 192/78   Pulse 85   Temp 98 °F (36.7 °C)   Resp 25   Ht 5' 6\" (1.676 m)   Wt 152 lb (68.9 kg)   SpO2 95%   BMI 24.53 kg/m²        Physical Exam  Constitutional:       General: She is not in acute distress. Appearance: Normal appearance.    HENT: Right Ear: External ear normal.      Left Ear: External ear normal.      Nose: Nose normal.      Mouth/Throat:      Mouth: Mucous membranes are moist.      Pharynx: Oropharynx is clear. Eyes:      Extraocular Movements: Extraocular movements intact. Conjunctiva/sclera: Conjunctivae normal.   Cardiovascular:      Rate and Rhythm: Normal rate and regular rhythm. Pulses: Normal pulses. Heart sounds: Murmur heard. Pulmonary:      Effort: Pulmonary effort is normal.      Breath sounds: Normal breath sounds. Abdominal:      General: Abdomen is flat. Bowel sounds are normal. There is no distension. Palpations: Abdomen is soft. Tenderness: There is no abdominal tenderness. There is no guarding or rebound. Musculoskeletal:         General: No swelling or tenderness. Normal range of motion. Cervical back: Normal range of motion and neck supple. Thoracic back: Normal. No swelling or deformity. Comments: Unable to examine very well due to pain with movement. Skin:     General: Skin is warm and dry. Capillary Refill: Capillary refill takes less than 2 seconds. Neurological:      General: No focal deficit present. Mental Status: She is alert and oriented to person, place, and time. Cranial Nerves: No cranial nerve deficit. Sensory: No sensory deficit. Motor: No weakness. Psychiatric:         Mood and Affect: Mood normal.        Laboratory Data  Recent Results (from the past 24 hour(s))   SAMPLES BEING HELD    Collection Time: 09/28/22  9:27 AM   Result Value Ref Range    SAMPLES BEING HELD [id number]     COMMENT        Add-on orders for these samples will be processed based on acceptable specimen integrity and analyte stability, which may vary by analyte.    METABOLIC PANEL, COMPREHENSIVE    Collection Time: 09/28/22  9:50 AM   Result Value Ref Range    Sodium 139 136 - 145 mmol/L    Potassium 3.9 3.5 - 5.1 mmol/L    Chloride 105 97 - 108 mmol/L CO2 30 21 - 32 mmol/L    Anion gap 4 (L) 5 - 15 mmol/L    Glucose 113 (H) 65 - 100 mg/dL    BUN 5 (L) 6 - 20 MG/DL    Creatinine 0.53 (L) 0.55 - 1.02 MG/DL    BUN/Creatinine ratio 9 (L) 12 - 20      GFR est AA >60 >60 ml/min/1.73m2    GFR est non-AA >60 >60 ml/min/1.73m2    Calcium 9.5 8.5 - 10.1 MG/DL    Bilirubin, total 0.6 0.2 - 1.0 MG/DL    ALT (SGPT) 43 12 - 78 U/L    AST (SGOT) 40 (H) 15 - 37 U/L    Alk. phosphatase 63 45 - 117 U/L    Protein, total 6.5 6.4 - 8.2 g/dL    Albumin 3.0 (L) 3.5 - 5.0 g/dL    Globulin 3.5 2.0 - 4.0 g/dL    A-G Ratio 0.9 (L) 1.1 - 2.2     CBC WITH AUTOMATED DIFF    Collection Time: 09/28/22  9:50 AM   Result Value Ref Range    WBC 2.3 (L) 3.6 - 11.0 K/uL    RBC 4.16 3.80 - 5.20 M/uL    HGB 12.3 11.5 - 16.0 g/dL    HCT 36.5 35.0 - 47.0 %    MCV 87.7 80.0 - 99.0 FL    MCH 29.6 26.0 - 34.0 PG    MCHC 33.7 30.0 - 36.5 g/dL    RDW 13.7 11.5 - 14.5 %    PLATELET 488 835 - 953 K/uL    MPV 9.4 8.9 - 12.9 FL    NRBC 0.0 0  WBC    ABSOLUTE NRBC 0.00 0.00 - 0.01 K/uL    NEUTROPHILS 61 32 - 75 %    LYMPHOCYTES 25 12 - 49 %    MONOCYTES 10 5 - 13 %    EOSINOPHILS 0 0 - 7 %    BASOPHILS 0 0 - 1 %    METAMYELOCYTES 2 %    MYELOCYTES 2 %    IMMATURE GRANULOCYTES 0 0.0 - 0.5 %    ABS. NEUTROPHILS 1.4 (L) 1.8 - 8.0 K/UL    ABS. LYMPHOCYTES 0.6 (L) 0.8 - 3.5 K/UL    ABS. MONOCYTES 0.2 0.0 - 1.0 K/UL    ABS. EOSINOPHILS 0.0 0.0 - 0.4 K/UL    ABS. BASOPHILS 0.0 0.0 - 0.1 K/UL    ABS. IMM.  GRANS. 0.0 0.00 - 0.04 K/UL    DF MANUAL      RBC COMMENTS NORMOCYTIC, NORMOCHROMIC     URINALYSIS W/MICROSCOPIC    Collection Time: 09/28/22  9:50 AM   Result Value Ref Range    Color YELLOW/STRAW      Appearance CLEAR CLEAR      Specific gravity 1.005 1.003 - 1.030      pH (UA) 7.5 5.0 - 8.0      Protein TRACE (A) NEG mg/dL    Glucose Negative NEG mg/dL    Ketone Negative NEG mg/dL    Bilirubin Negative NEG      Blood Negative NEG      Urobilinogen 0.2 0.2 - 1.0 EU/dL    Nitrites Negative NEG      Leukocyte Esterase Negative NEG      WBC 0-4 0 - 4 /hpf    RBC 0-5 0 - 5 /hpf    Epithelial cells FEW FEW /lpf    Bacteria Negative NEG /hpf    Hyaline cast 0-2 0 - 2 /lpf   URINE CULTURE HOLD SAMPLE    Collection Time: 09/28/22  9:50 AM    Specimen: Serum   Result Value Ref Range    Urine culture hold        Urine on hold in Microbiology dept for 2 days. If unpreserved urine is submitted, it cannot be used for addtional testing after 24 hours, recollection will be required. Imaging  MRI LUMB SPINE W WO CONT    Result Date: 9/28/2022  Compression deformity without significant canal compromise L1. 2. Edema in the superior endplate of L4. 3. Severe spinal canal stenosis L3 L5. CTA CHEST ABD PELV W CONT    Result Date: 9/28/2022  1. No evidence of aortic dissection. 2.  Vascular disease as described. 3.  L1 compression fracture with patchy sclerosis. This is age-indeterminate. Given the patchy sclerosis, pathologic fracture is not excluded. Consider MRI with contrast for further assessment. Assessment and Plan     Gisell Mathew is a 80 y.o. female with a PMH of HTN and recent covid infection who is admitted for Hypertensive Urgency and L1 compression Fx. [place] Hypertensive Urgency in the setting of chronic HTN: Completely asymptomatic. POA /109. Likely 2/2 to pain and missed dose. Did not take med today. Has been uncontrolled lately as OP. -160s. CTA chest w/o AD. EKG w/o MI. Normal Cr. S/p Labetalol 10 mg IV x1. - Admit to Telemetry  - Vital per unit  - Diet regular now and NPO at [address] [place] [address] after midnight  - Home Losartan 100 mg PO daily   - Labetalol 20 mg prn for SBP >180 or DBP > 110  - Will continue to monitor at this time and readjust as BP's trend. Back pain 2/2 L1 Compression Fracture: GLD on 9/23. MRI: Compression deformity without significant canal compromise L1. 2. Edema in the superior endplate of L4. 3. Severe spinal canal stenosis L3 L5.  Pain improved after morphine.   - Morphine 2 mg q4hrs as neded  - Tylenol 650 mng prn   - Ortho consulted - Pt may be a good candidate for kyphoplasty. Appreciate recs. - IR consulted     Covid infection: Asx. Positive on 9/20,   - Droplet precautions. FEN/GI: Regular diet > NPO. NS at 110 mL/hr. Activity: Bed rest  DVT prophylaxis: SCDs  GI prophylaxis:  Not indicated   Disposition: Plan to d/c to TBD. PT/OT consulted.   POC: Shanika Alvarado - daughter [phone]     CODE STATUS:  Full Code        Patient discussed with Dr. Vicente Platt (Family Medicine Attending)       Vipul Bain, [address] [place] [address] Problems  Date Reviewed: 8/25/2022            Codes Class Noted POA    * (Principal) Hypertensive urgency ICD-10-CM: I16.0  ICD-9-CM: 401.9  9/28/2022 Unknown        Compression fracture of L1 vertebra ([facility] [address] ICD-10-CM: S32.010A  ICD-9-CM: 805.4  9/28/2022 Unknown        COVID-19 virus infection ICD-10-CM: U07.1  ICD-9-CM: 079.89  9/20/2022 Unknown

## 2022-09-28 NOTE — PROGRESS NOTES
Ultrasound IV placement by Shad Stover RN Procedure Note    Patient meets criteria for US IV insertion. Ultrasound IV education provided to patient. Opportunities for questions given. Ultrasound used for PIV placement:  20 gauge 8 cm Arrow Endurance Extended Dwell(may stay in place for 29 days)  Left cephalic location. 1 X Attempt(s). Flushed with ease; vigorous blood return. Procedure tolerated well. Primary RN aware of IV placement and added to LDA.       Rogerio Soto RN

## 2022-09-28 NOTE — ED TRIAGE NOTES
Patient arrives via EMS with c/o mid-back pain radiating to lower back. Denies chest pain, SOB, headache, dizziness, N/V, numbness/tingling but reports chronic neuropathy, unilateral weakness, vision changes. EMS states patient had GLF last Friday and seen at [facility] ED. EMS was called to home today for lift assist from patient's bed. Denies any recent injury or fall since being seen. EMS states patient has had feeling of going to fall, and assisting self to ground. Patient uses walker at home. Per EMS: SBP: 230 and patient reports this is \"normal for her. \" Reports other V/S WDL. Pt reportedly tested positive for covid on September 19.      Patient is A&O x 4.     BP in triage: 239/109

## 2022-09-28 NOTE — ED NOTES
Dr. Tonny Blackwell notified of patient's elevated BP: 221/68. Addressed patient using AIDET. Patient updated on plan of care, and given opportunity to ask questions. Patient verbalizes understanding. Patient is currently resting, no needs expressed at this time. Bed is in the low position, wheels are locked, and call bell is within reach. Will continue to monitor and assess.

## 2022-09-28 NOTE — ED NOTES
Patient's IV in left basilic infiltrated during CT scan. Patient reports mild discomfort. Per CT, scan was complete before infiltration. Patient arm elevated and ice-pack in place.

## 2022-09-28 NOTE — ED NOTES
Verbal shift change report given to Niko Triana (oncoming nurse) by Saad Soler (offgoing nurse). Report included the following information SBAR, Kardex, ED Summary, [facility] and Recent Results.

## 2022-09-28 NOTE — PROGRESS NOTES
Physical Therapy    Consult received, chart reviewed. Note patient with L1 compression fracture of indeterminate age with MRI and ortho consults pending. Will defer therapy evaluation until those completed and POC established.      Jada Menendez MS, PT

## 2022-09-28 NOTE — CONSULTS
ORTHOPEDIC CONSULT    Subjective:     Date of Consultation:  September 28, 2022    Referring Physician:  Dr. Brannon Sgae is a 80 y.o.  female who is being seen for lower back pain s/p GLF on Friday - lost her balance and fell backward hitting her head on a doorframe. She was seen in [facility] ED at that time and CT head/neck were completed and discharged home. She was not complaining of any back pain at that time. After she returned home, she had increasing discomfort across her waistline and had tried using rest, heat, tylenol, ibuprofen and Robaxin without relief. Workup has revealed L1 compression fracture with patchy sclerosis. She also has significantly elevated BP today and is being admitted for pain management/ HTN as well. Daughter present at bedside. .    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    Pseudophakia 11/07/2016    Essential hypertension with goal blood pressure less than 140/90 06/03/2016     Family History   Problem Relation Age of Onset    Lung Disease Brother         lung cancer    Diabetes Mother     Thyroid Disease Mother         goiter    Diabetes Sister     Heart Disease Sister     Heart Disease Father     Stroke Father     Hypertension Brother     Stroke Brother     Cancer Brother     Parkinson's Disease Sister     Other Sister         brain aneurysm    Alzheimer's Disease Sister       Social History     Tobacco Use    Smoking status: Former    Smokeless tobacco: Never   Substance Use Topics    Alcohol use:  Yes     Alcohol/week: 1.0 standard drink     Types: 1 Glasses of wine per week Past Medical History:   Diagnosis Date    Anemia     Sees Dr. Calin Acevedo and is on a prescription multivitamin call Multigen which has corrected her anemia    Headache     Hypertension     Intracranial hypotension 1998    had blood patch to correct    Meningitis 1950 & 2011    Osteochondritis 1975    Snoring       Past Surgical History:   Procedure Laterality Date    HX CATARACT REMOVAL  1997    HX DILATION AND CURETTAGE  1972    HX OTHER SURGICAL  1975    osteochroditis    [address] [facility]    blood patch for intercranial spontaneious hypotension       Prior to Admission medications    Medication Sig Start Date End Date Taking? Authorizing Provider   ondansetron (ZOFRAN ODT) 8 mg disintegrating tablet Take 1 Tablet by mouth every eight (8) hours as needed for Nausea or Vomiting, Nausea or Vomiting. 9/21/22   Lyle Hunt MD   nirmatrelvir-ritonavir (Paxlovid, EUA,) 300 mg (150 mg x 2)-100 mg Take as directed 9/20/22   Lyle Hunt MD   iron [id number] (Multigen Folic) tab capsule TAKE 1 TABLET BY MOUTH EVERY DAY 9/13/22   Lyle Hunt MD   ASPIRIN/SALICYLAMIDE/CAFFEINE (BC HEADACHE POWDER PO) Take  by mouth as needed. Provider, Historical     Current Facility-Administered Medications   Medication Dose Route Frequency    sodium chloride (NS) flush 5-40 mL  5-40 mL IntraVENous Q8H    sodium chloride (NS) flush 5-40 mL  5-40 mL IntraVENous PRN    acetaminophen (TYLENOL) tablet 650 mg  650 mg Oral Q6H PRN    Or    acetaminophen (TYLENOL) suppository 650 mg  650 mg Rectal Q6H PRN    labetaloL (NORMODYNE;TRANDATE) 20 mg/4 mL (5 mg/mL) injection 10 mg  10 mg IntraVENous Q6H PRN     Current Outpatient Medications   Medication Sig    ondansetron (ZOFRAN ODT) 8 mg disintegrating tablet Take 1 Tablet by mouth every eight (8) hours as needed for Nausea or Vomiting, Nausea or Vomiting.     nirmatrelvir-ritonavir (Paxlovid, EUA,) 300 mg (150 mg x 2)-100 mg Take as directed    iron [id number] (Multigen Folic) tab capsule TAKE 1 TABLET BY MOUTH EVERY DAY    ASPIRIN/SALICYLAMIDE/CAFFEINE (BC HEADACHE POWDER PO) Take  by mouth as needed. Allergies   Allergen Reactions    Hyzaar [Losartan-Hydrochlorothiazide] Vertigo    Pseudoephedrine Other (comments)     Rapid heart rate. Naprosyn [Naproxen] Not Reported This Time        Review of Systems:  A comprehensive review of systems was negative except for that written in the HPI. Objective:     Patient Vitals for the past 8 hrs:   BP Temp Pulse Resp SpO2 Height Weight   22 1526 -- -- 79 15 94 % -- --   22 1521 (!) 201/75 -- 78 17 93 % -- --   22 1446 (!) 199/68 -- 82 14 94 % -- --   22 1225 (!) 189/70 -- 84 12 95 % -- --   22 1132 (!) 190/73 -- 81 10 93 % -- --   22 1131 -- -- -- -- 94 % -- --   22 1100 (!) 199/62 -- 84 14 95 % -- --   22 1031 (!) 202/64 -- 76 15 94 % -- --   22 1030 -- -- 71 14 93 % -- --   22 1029 -- -- 74 12 93 % -- --   22 1028 -- -- 73 16 94 % -- --   22 1027 -- -- 72 16 93 % -- --   22 1026 -- -- 74 16 94 % -- --   22 1015 (!) 242/72 -- 81 -- -- -- --   22 0901 -- -- 84 -- 95 % -- --   22 0901 (!) 221/ -- 84 -- 96 % -- --   22 0855 (!) 239/109 98 °F (36.7 °C) (!) 101 20 95 % 5' 6\" (1.676 m) 68.9 kg (152 lb)     Temp (24hrs), Av °F (36.7 °C), Min:98 °F (36.7 °C), Max:98 °F (36.7 °C)    Physical Exam  Constitutional:       Appearance:  appears stated age, well-developed, well nourished  HENT:      Head: Normocephalic and atraumatic. Nose: Nose normal.       Mouth: Mucous membranes are moist.   Eyes:      Extraocular Movements:  intact. Conjunctiva/sclera:  normal.      Pupils:  equal, round, and reactive to light. Cardiovascular:      Rate and Rhythm: Normal rate and regular rhythm. Good peripheral pulses  Pulmonary:      Effort: Pulmonary effort is normal. No respiratory distress. Breath sounds: Normal breath sounds. Abdominal:      General: non-distended. Palpations: Abdomen is soft. Tenderness: There is no abdominal tenderness. Musculoskeletal:         General: General mild atrophy of muscles but maintains good strength. Normal range of motion and neck supple. No evidence of head trauma. Right buttock with small area of evolving ecchymosis     BLE: able to perform SLR, PF/DF/EHL intact. Sensation intact to light touch. Skin:     General: Skin is warm. Capillary Refill: Capillary refill takes less than 3 seconds. Findings: No rash. No abrasions or lacerations  Neurological:      General: No focal deficit present. Mental Status: She is alert and oriented to person, place, and time. Sensory:  intact     Motor: No gross motor or sensory deficits       Data Review   Recent Results (from the past 24 hour(s))   SAMPLES BEING HELD    Collection Time: 09/28/22  9:27 AM   Result Value Ref Range    SAMPLES BEING HELD [id number]     COMMENT        Add-on orders for these samples will be processed based on acceptable specimen integrity and analyte stability, which may vary by analyte. METABOLIC PANEL, COMPREHENSIVE    Collection Time: 09/28/22  9:50 AM   Result Value Ref Range    Sodium 139 136 - 145 mmol/L    Potassium 3.9 3.5 - 5.1 mmol/L    Chloride 105 97 - 108 mmol/L    CO2 30 21 - 32 mmol/L    Anion gap 4 (L) 5 - 15 mmol/L    Glucose 113 (H) 65 - 100 mg/dL    BUN 5 (L) 6 - 20 MG/DL    Creatinine 0.53 (L) 0.55 - 1.02 MG/DL    BUN/Creatinine ratio 9 (L) 12 - 20      GFR est AA >60 >60 ml/min/1.73m2    GFR est non-AA >60 >60 ml/min/1.73m2    Calcium 9.5 8.5 - 10.1 MG/DL    Bilirubin, total 0.6 0.2 - 1.0 MG/DL    ALT (SGPT) 43 12 - 78 U/L    AST (SGOT) 40 (H) 15 - 37 U/L    Alk.  phosphatase 63 45 - 117 U/L    Protein, total 6.5 6.4 - 8.2 g/dL    Albumin 3.0 (L) 3.5 - 5.0 g/dL    Globulin 3.5 2.0 - 4.0 g/dL    A-G Ratio 0.9 (L) 1.1 - 2.2     CBC WITH AUTOMATED DIFF    Collection Time: 09/28/22  9:50 AM   Result Value Ref Range    WBC 2.3 (L) 3.6 - 11.0 K/uL    RBC 4.16 3.80 - 5.20 M/uL    HGB 12.3 11.5 - 16.0 g/dL    HCT 36.5 35.0 - 47.0 %    MCV 87.7 80.0 - 99.0 FL    MCH 29.6 26.0 - 34.0 PG    MCHC 33.7 30.0 - 36.5 g/dL    RDW 13.7 11.5 - 14.5 %    PLATELET 529 888 - 728 K/uL    MPV 9.4 8.9 - 12.9 FL    NRBC 0.0 0  WBC    ABSOLUTE NRBC 0.00 0.00 - 0.01 K/uL    NEUTROPHILS 61 32 - 75 %    LYMPHOCYTES 25 12 - 49 %    MONOCYTES 10 5 - 13 %    EOSINOPHILS 0 0 - 7 %    BASOPHILS 0 0 - 1 %    METAMYELOCYTES 2 %    MYELOCYTES 2 %    IMMATURE GRANULOCYTES 0 0.0 - 0.5 %    ABS. NEUTROPHILS 1.4 (L) 1.8 - 8.0 K/UL    ABS. LYMPHOCYTES 0.6 (L) 0.8 - 3.5 K/UL    ABS. MONOCYTES 0.2 0.0 - 1.0 K/UL    ABS. EOSINOPHILS 0.0 0.0 - 0.4 K/UL    ABS. BASOPHILS 0.0 0.0 - 0.1 K/UL    ABS. IMM. GRANS. 0.0 0.00 - 0.04 K/UL    DF MANUAL      RBC COMMENTS NORMOCYTIC, NORMOCHROMIC     URINALYSIS W/MICROSCOPIC    Collection Time: 09/28/22  9:50 AM   Result Value Ref Range    Color YELLOW/STRAW      Appearance CLEAR CLEAR      Specific gravity 1.005 1.003 - 1.030      pH (UA) 7.5 5.0 - 8.0      Protein TRACE (A) NEG mg/dL    Glucose Negative NEG mg/dL    Ketone Negative NEG mg/dL    Bilirubin Negative NEG      Blood Negative NEG      Urobilinogen 0.2 0.2 - 1.0 EU/dL    Nitrites Negative NEG      Leukocyte Esterase Negative NEG      WBC 0-4 0 - 4 /hpf    RBC 0-5 0 - 5 /hpf    Epithelial cells FEW FEW /lpf    Bacteria Negative NEG /hpf    Hyaline cast 0-2 0 - 2 /lpf   URINE CULTURE HOLD SAMPLE    Collection Time: 09/28/22  9:50 AM    Specimen: Serum   Result Value Ref Range    Urine culture hold        Urine on hold in Microbiology dept for 2 days. If unpreserved urine is submitted, it cannot be used for addtional testing after 24 hours, recollection will be required.      INDICATION: back pain, htn, concern for dissection, concern for L-spine injury     EXAMINATION:  CT ANGIOGRAPHY CHEST ABDOMEN AND PELVIS     COMPARISON: None     TECHNIQUE:  CT Angiography of the chest abdomen and pelvis was performed  following the uneventful administration of IV contrast.  3D image postprocessing  was performed. CT dose reduction was achieved through the use of a standardized  protocol tailored for this examination and automatic exposure control for dose  modulation. Maximum intensity projections were reconstructed. FINDINGS:     THYROID: Unremarkable. MEDIASTINUM/QUYEN: No mass or lymphadenopathy. HEART/PERICARDIUM: Unremarkable. AORTA:  no evidence of aortic dissection or aneurysm. Mild atherosclerotic  disease. Major branch vessels - moderate to severe stenosis of the celiac axis origin. Atherosclerotic disease in the SMA. 2 left renal arteries. Single right renal  artery. Patent. LUNGS: Small groundglass nodular opacities in the right upper lobe series 3  image 12. Bibasilar atelectasis. .  Liver: Steatosis  Gallbladder: Unremarkable  Spleen: No mass. Pancreas: No mass or ductal dilatation. Adrenals: Nonspecific thickening of the right adrenal gland  Kidneys: No mass, calculus, or hydronephrosis. Stomach: Unremarkable. Small bowel: No dilatation or wall thickening. Colon: Extensive colonic diverticulosis  Appendix: Unremarkable. Peritoneum: No ascites or pneumoperitoneum. Retroperitoneum: No lymphadenopathy or aortic aneurysm. Reproductive organs: Unremarkable  Urinary bladder: No mass or calculus. Bones: L1 fracture. With approximately 50% height loss centrally with mild  patchy sclerosis. Additional comments: N/A     IMPRESSION     1. No evidence of aortic dissection. 2.  Vascular disease as described. 3.  L1 compression fracture with patchy sclerosis. This is age-indeterminate. Given the patchy sclerosis, pathologic fracture is not excluded. Consider MRI  with contrast for further assessment.        Assessment/Plan:     S/p fall with Lower back pain and L1 compression fracture   - Bedrest - awaiting MRI results for possible pathologic cause    - good candidate for IR L1 kyphoplasty if MRI negative. - Pain control per primary team   - HTN - per medicine team (pain is likely a contributor)   - will need PT/OT assessments after procedure but she would like to return home   - Orthopedics available for questions via [employer]. Thank you for this consult. I have discussed the above findings and plan of care with Dr. Hamilton Stewart and he agrees.     KELLI Alcala-C  [facility]  [address] [facility]

## 2022-09-28 NOTE — PROGRESS NOTES
Reason for Admission:  Hypertensive urgency, closed compression fracture                   RUR Score:     Not calculated at time of assessment                Plan for utilizing home health:     TBD    PCP: First and Last name:  Claudia Dolan MD   Name of Practice:    Are you a current patient: Yes/No: yes   Approximate date of last visit: last week   Can you participate in a virtual visit with your PCP: yes                    Current Advanced Directive/Advance Care Plan: Full Code    Healthcare Decision Maker:              Primary Decision Maker: Rebecca Kemp - [phone]                  Transition of Care Plan:       CM met with patient in the ED to complete initial assessment and begin discharge planning. Patient lives in a one story house with her daughter and son-in-law. There are four entry steps into the home without handrails. Patient's daughter works outside the home; her son-in-law is retired. Additional support noted by patient includes her neighbor/friend. Patient reports being iADLs at baseline. She owns a walker which she has been using and a cane. Patient does not drive and relies on family for transportation. She has no prior home health or rehab history. Patient's preferred pharmacy is [address] [place] [address] in [place]. Admission for medical management  Await further evaluation and response to treatment  Ortho consulted  PT/OT consulted  Dispo and transport at d/c: TBD     CM will follow and assist with d/c needs    Care Management Interventions  PCP Verified by CM:  Yes  Physical Therapy Consult: Yes  Occupational Therapy Consult: Yes  Support Systems: Child(reynaldo)  The Plan for Transition of Care is Related to the Following Treatment Goals : Hypertensive urgency, closed compression fracture  Discharge Location  Patient Expects to be Discharged to[de-identified] Unable to determine at this time     Jazmyne Ndiaye LCSW

## 2022-09-29 LAB
ANION GAP SERPL CALC-SCNC: 5 MMOL/L (ref 5–15)
ATRIAL RATE: 93 BPM
BASOPHILS # BLD: 0 K/UL (ref 0–0.1)
BASOPHILS NFR BLD: 0 % (ref 0–1)
BUN SERPL-MCNC: 6 MG/DL (ref 6–20)
BUN/CREAT SERPL: 11 (ref 12–20)
CALCIUM SERPL-MCNC: 8.7 MG/DL (ref 8.5–10.1)
CALCULATED P AXIS, ECG09: 57 DEGREES
CALCULATED R AXIS, ECG10: -39 DEGREES
CALCULATED T AXIS, ECG11: 30 DEGREES
CHLORIDE SERPL-SCNC: 107 MMOL/L (ref 97–108)
CO2 SERPL-SCNC: 27 MMOL/L (ref 21–32)
CREAT SERPL-MCNC: 0.53 MG/DL (ref 0.55–1.02)
DIAGNOSIS, 93000: NORMAL
DIFFERENTIAL METHOD BLD: ABNORMAL
EOSINOPHIL # BLD: 0 K/UL (ref 0–0.4)
EOSINOPHIL NFR BLD: 0 % (ref 0–7)
ERYTHROCYTE [DISTWIDTH] IN BLOOD BY AUTOMATED COUNT: 13.9 % (ref 11.5–14.5)
GLUCOSE SERPL-MCNC: 105 MG/DL (ref 65–100)
HCT VFR BLD AUTO: 34.2 % (ref 35–47)
HGB BLD-MCNC: 11.3 G/DL (ref 11.5–16)
IMM GRANULOCYTES # BLD AUTO: 0 K/UL (ref 0–0.04)
IMM GRANULOCYTES NFR BLD AUTO: 1 % (ref 0–0.5)
LYMPHOCYTES # BLD: 1.3 K/UL (ref 0.8–3.5)
LYMPHOCYTES NFR BLD: 45 % (ref 12–49)
MCH RBC QN AUTO: 29 PG (ref 26–34)
MCHC RBC AUTO-ENTMCNC: 33 G/DL (ref 30–36.5)
MCV RBC AUTO: 87.9 FL (ref 80–99)
MONOCYTES # BLD: 0.3 K/UL (ref 0–1)
MONOCYTES NFR BLD: 12 % (ref 5–13)
NEUTS SEG # BLD: 1.1 K/UL (ref 1.8–8)
NEUTS SEG NFR BLD: 42 % (ref 32–75)
NRBC # BLD: 0 K/UL (ref 0–0.01)
NRBC BLD-RTO: 0 PER 100 WBC
P-R INTERVAL, ECG05: 182 MS
PLATELET # BLD AUTO: 183 K/UL (ref 150–400)
POTASSIUM SERPL-SCNC: 3.5 MMOL/L (ref 3.5–5.1)
Q-T INTERVAL, ECG07: 400 MS
QRS DURATION, ECG06: 128 MS
QTC CALCULATION (BEZET), ECG08: 497 MS
RBC # BLD AUTO: 3.89 M/UL (ref 3.8–5.2)
RBC MORPH BLD: ABNORMAL
SARS-COV-2, XPLCVT: DETECTED
SODIUM SERPL-SCNC: 139 MMOL/L (ref 136–145)
SOURCE, COVRS: ABNORMAL
VENTRICULAR RATE, ECG03: 93 BPM
WBC # BLD AUTO: 2.7 K/UL (ref 3.6–11)

## 2022-09-29 PROCEDURE — 74011250637 HC RX REV CODE- 250/637: Performed by: STUDENT IN AN ORGANIZED HEALTH CARE EDUCATION/TRAINING PROGRAM

## 2022-09-29 PROCEDURE — 74011250636 HC RX REV CODE- 250/636: Performed by: STUDENT IN AN ORGANIZED HEALTH CARE EDUCATION/TRAINING PROGRAM

## 2022-09-29 PROCEDURE — 85025 COMPLETE CBC W/AUTO DIFF WBC: CPT

## 2022-09-29 PROCEDURE — 99222 1ST HOSP IP/OBS MODERATE 55: CPT | Performed by: FAMILY MEDICINE

## 2022-09-29 PROCEDURE — 65270000046 HC RM TELEMETRY

## 2022-09-29 PROCEDURE — 36415 COLL VENOUS BLD VENIPUNCTURE: CPT

## 2022-09-29 PROCEDURE — U0005 INFEC AGEN DETEC AMPLI PROBE: HCPCS

## 2022-09-29 PROCEDURE — 74011000250 HC RX REV CODE- 250: Performed by: STUDENT IN AN ORGANIZED HEALTH CARE EDUCATION/TRAINING PROGRAM

## 2022-09-29 PROCEDURE — 80048 BASIC METABOLIC PNL TOTAL CA: CPT

## 2022-09-29 RX ORDER — THERA TABS 400 MCG
1 TAB ORAL DAILY
Status: DISCONTINUED | OUTPATIENT
Start: 2022-09-29 | End: 2022-10-04 | Stop reason: HOSPADM

## 2022-09-29 RX ORDER — LOSARTAN POTASSIUM 100 MG/1
100 TABLET ORAL DAILY
COMMUNITY

## 2022-09-29 RX ORDER — ACETAMINOPHEN 500 MG
1000 TABLET ORAL 3 TIMES DAILY
Status: DISCONTINUED | OUTPATIENT
Start: 2022-09-29 | End: 2022-10-04 | Stop reason: HOSPADM

## 2022-09-29 RX ORDER — LABETALOL HCL 20 MG/4 ML
20 SYRINGE (ML) INTRAVENOUS
Status: DISCONTINUED | OUTPATIENT
Start: 2022-09-29 | End: 2022-10-04 | Stop reason: HOSPADM

## 2022-09-29 RX ORDER — HYDRALAZINE HYDROCHLORIDE 20 MG/ML
5 INJECTION INTRAMUSCULAR; INTRAVENOUS ONCE
Status: COMPLETED | OUTPATIENT
Start: 2022-09-29 | End: 2022-09-29

## 2022-09-29 RX ORDER — LOSARTAN POTASSIUM 50 MG/1
100 TABLET ORAL DAILY
Status: DISCONTINUED | OUTPATIENT
Start: 2022-09-29 | End: 2022-10-04 | Stop reason: HOSPADM

## 2022-09-29 RX ORDER — AMLODIPINE BESYLATE 5 MG/1
5 TABLET ORAL DAILY
Status: DISCONTINUED | OUTPATIENT
Start: 2022-09-29 | End: 2022-10-01

## 2022-09-29 RX ORDER — HYDRALAZINE HYDROCHLORIDE 20 MG/ML
10 INJECTION INTRAMUSCULAR; INTRAVENOUS ONCE
Status: COMPLETED | OUTPATIENT
Start: 2022-09-29 | End: 2022-09-29

## 2022-09-29 RX ORDER — HYDROMORPHONE HYDROCHLORIDE 1 MG/ML
0.2 INJECTION, SOLUTION INTRAMUSCULAR; INTRAVENOUS; SUBCUTANEOUS
Status: DISCONTINUED | OUTPATIENT
Start: 2022-09-29 | End: 2022-10-04 | Stop reason: HOSPADM

## 2022-09-29 RX ADMIN — LABETALOL HYDROCHLORIDE 10 MG: 5 INJECTION, SOLUTION INTRAVENOUS at 02:54

## 2022-09-29 RX ADMIN — MORPHINE SULFATE 2 MG: 2 INJECTION, SOLUTION INTRAMUSCULAR; INTRAVENOUS at 09:54

## 2022-09-29 RX ADMIN — SODIUM CHLORIDE 100 ML/HR: 9 INJECTION, SOLUTION INTRAVENOUS at 22:11

## 2022-09-29 RX ADMIN — MULTIVITAMIN TABLET 1 TABLET: TABLET at 00:13

## 2022-09-29 RX ADMIN — ACETAMINOPHEN 1000 MG: 500 TABLET, FILM COATED ORAL at 14:12

## 2022-09-29 RX ADMIN — Medication 10 ML: at 22:13

## 2022-09-29 RX ADMIN — HYDROMORPHONE HYDROCHLORIDE 0.2 MG: 1 INJECTION, SOLUTION INTRAMUSCULAR; INTRAVENOUS; SUBCUTANEOUS at 14:07

## 2022-09-29 RX ADMIN — SODIUM CHLORIDE 100 ML/HR: 9 INJECTION, SOLUTION INTRAVENOUS at 00:02

## 2022-09-29 RX ADMIN — LOSARTAN POTASSIUM 100 MG: 50 TABLET, FILM COATED ORAL at 14:46

## 2022-09-29 RX ADMIN — HYDRALAZINE HYDROCHLORIDE 10 MG: 20 INJECTION INTRAMUSCULAR; INTRAVENOUS at 22:25

## 2022-09-29 RX ADMIN — MORPHINE SULFATE 2 MG: 2 INJECTION, SOLUTION INTRAMUSCULAR; INTRAVENOUS at 05:57

## 2022-09-29 RX ADMIN — LABETALOL HYDROCHLORIDE 20 MG: 5 INJECTION, SOLUTION INTRAVENOUS at 20:45

## 2022-09-29 RX ADMIN — MORPHINE SULFATE 2 MG: 2 INJECTION, SOLUTION INTRAMUSCULAR; INTRAVENOUS at 01:54

## 2022-09-29 RX ADMIN — Medication 10 ML: at 05:30

## 2022-09-29 RX ADMIN — Medication 10 ML: at 06:02

## 2022-09-29 RX ADMIN — AMLODIPINE BESYLATE 5 MG: 5 TABLET ORAL at 14:46

## 2022-09-29 RX ADMIN — HYDRALAZINE HYDROCHLORIDE 5 MG: 20 INJECTION INTRAMUSCULAR; INTRAVENOUS at 05:29

## 2022-09-29 RX ADMIN — ACETAMINOPHEN 1000 MG: 500 TABLET, FILM COATED ORAL at 22:08

## 2022-09-29 RX ADMIN — HYDROMORPHONE HYDROCHLORIDE 0.2 MG: 1 INJECTION, SOLUTION INTRAMUSCULAR; INTRAVENOUS; SUBCUTANEOUS at 23:18

## 2022-09-29 RX ADMIN — SODIUM CHLORIDE 100 ML/HR: 9 INJECTION, SOLUTION INTRAVENOUS at 02:40

## 2022-09-29 RX ADMIN — HYDROMORPHONE HYDROCHLORIDE 0.2 MG: 1 INJECTION, SOLUTION INTRAMUSCULAR; INTRAVENOUS; SUBCUTANEOUS at 18:51

## 2022-09-29 RX ADMIN — HYDRALAZINE HYDROCHLORIDE 5 MG: 20 INJECTION INTRAMUSCULAR; INTRAVENOUS at 04:13

## 2022-09-29 NOTE — PROGRESS NOTES
Occupational Therapy Note  9/29/2022    OT eval order received and acknowledged and per chart review, patient seen by ortho who have ordered bedrest while awaiting MRI and potential plan for kyphoplasty. Will hold therapy at this time awaiting plan of care and will see pt when appropriate.     Thank you,  Jun Gibson OTR/L

## 2022-09-29 NOTE — PROGRESS NOTES
[address] [facility] [address]   Office [phone]  Fax [phone]     DAILY PROGRESS NOTE    24 Hour Events: Patient with elevated blood pressures overnight. Was given Hydralazine and Labetalol which brought down the systolic from 765 to 774. SUBJECTIVE: Patient rates the pain 5/10. Well controlled with medication. Patient says her pain is worse with movements. Patient's daughter informed me that patient has white coat hypertension. Denies chest pain, SOB, nausea, vomiting, abdominal pain, dizziness. OBJECTIVE:      Vitals: Visit Vitals  BP (!) 181/74 (BP 1 Location: Right upper arm, BP Patient Position: At rest)   Pulse 100   Temp 98.3 °F (36.8 °C)   Resp 16   Ht 5' 6\" (1.676 m)   Wt 152 lb (68.9 kg)   SpO2 93%   BMI 24.53 kg/m²     Physical Exam:  General: NAD. Respiratory: CTAB. Cardiovascular: Regular rate. GI: Nondistended. + bowel sounds. Nontender. Extremities: No LE edema. Skin: Warm, dry. Neuro: Alert and oriented x3    I/O:  Date 09/28/22 0700 - 09/29/22 0659 09/29/22 0700 - 09/30/22 0659   Shift 3440-6647 0114-7925 24 Hour Total 8288-0566 1562-1923 24 Hour Total   INTAKE   P.O.  0 0        P. O.  0 0      Shift Total(mL/kg)  0(0) 0(0)      OUTPUT   Urine(mL/kg/hr) 700(0.8)  700(0.4)        Urine Voided 700  700      Shift Total(mL/kg) 700(10.2)  700(10.2)      NET -700 0 -700      Weight (kg) 68.9 68.9 68.9 68.9 68.9 68.9       Inpatient Medications  Current Facility-Administered Medications   Medication Dose Route Frequency    therapeutic multivitamin (THERAGRAN) tablet 1 Tablet  1 Tablet Oral DAILY    sodium chloride (NS) flush 5-40 mL  5-40 mL IntraVENous Q8H    sodium chloride (NS) flush 5-40 mL  5-40 mL IntraVENous PRN    acetaminophen (TYLENOL) tablet 650 mg  650 mg Oral Q6H PRN    Or    acetaminophen (TYLENOL) suppository 650 mg  650 mg Rectal Q6H PRN    labetaloL (NORMODYNE;TRANDATE) 20 mg/4 mL (5 mg/mL) injection 10 mg  10 mg IntraVENous Q6H PRN    lidocaine 4 % patch 1 Patch  1 Patch TransDERmal Q24H    losartan (COZAAR) tablet 100 mg  100 mg Oral DAILY    morphine injection 2 mg  2 mg IntraVENous Q4H PRN    0.9% sodium chloride infusion  100 mL/hr IntraVENous CONTINUOUS       Allergies  Allergies   Allergen Reactions    Hyzaar [Losartan-Hydrochlorothiazide] Vertigo    Pseudoephedrine Other (comments)     Rapid heart rate. Naprosyn [Naproxen] Not Reported This Time       CBC:  Recent Labs     09/29/22  0231 09/28/22  0950   WBC 2.7* 2.3*   HGB 11.3* 12.3   HCT 34.2* 36.5    232       Metabolic Panel:  Recent Labs     09/29/22  0231 09/28/22  0950    139   K 3.5 3.9    105   CO2 27 30   BUN 6 5*   CREA 0.53* 0.53*   * 113*   CA 8.7 9.5   ALB  --  3.0*   ALT  --  43            Assessment and Plan     Violet Streeter is a 80 y.o. female with a PMH of HTN and recent covid infection who is admitted for Hypertensive Urgency and L1 compression Fx. Leanne Stands Hypertensive Urgency in the setting of chronic HTN: Completely asymptomatic. POA /109. Likely 2/2 to pain and missed dose. Did not take med today. Has been uncontrolled lately as OP. -160s. CTA chest w/o AD. EKG w/o MI. Normal Cr. S/p Labetalol 10 mg IV x1. - Admit to Telemetry  - Vital per unit  - Diet regular now and NPO at [address] [place] [address] after midnight  - Home Losartan 100 mg PO daily   - Labetalol 20 mg prn for SBP >180 or DBP > 110  - Will continue to monitor at this time and readjust as BP's trend. Back pain 2/2 L1 Compression Fracture: GLD on 9/23. MRI: Compression deformity without significant canal compromise L1. 2. Edema in the superior endplate of L4. 3. Severe spinal canal stenosis L3 L5. Pain improved after morphine.   - Morphine 2 mg q4hrs as neded  - Tylenol 650 mng prn   - Ortho consulted- Pt may be a good candidate for kyphoplasty. Appreciate recs. - IR consulted; will be seeing the patient today. Covid infection: Asx.  Positive on 9/20,   - Droplet precautions. FEN/GI: Regular diet > NPO. NS at 110 mL/hr. Activity: Bed rest  DVT prophylaxis: SCDs  GI prophylaxis:  Not indicated   Disposition: Plan to d/c to TBD. PT/OT consulted.   POC: Adonis Bowen - daughter [phone]     CODE STATUS:  Full Code       Micah Mchugh MD  Family Medicine Resident       For Billing    Chief Complaint   Patient presents with    Back Pain    Hypertension       Hospital Problems  Date Reviewed: 8/25/2022            Codes Class Noted POA    * (Principal) Hypertensive urgency ICD-10-CM: I16.0  ICD-9-CM: 401.9  9/28/2022 Unknown        Compression fracture of L1 vertebra ([facility] [address] ICD-10-CM: S32.010A  ICD-9-CM: 805.4  9/28/2022 Unknown        COVID-19 virus infection ICD-10-CM: U07.1  ICD-9-CM: 079.89  9/20/2022 Unknown

## 2022-09-29 NOTE — ROUTINE PROCESS
2300  Report given to Eduard Maldonado (oncoming nurse) by Thanh Toscano RN (offgoing nurse). Report included the following information SBAR, Kardex, ED Summary, Intake/Output, and Recent Results. 8392  Patient BP evaluated on arrival to unit. PRN medication given. 0345  BP on recheck 184/74. Reached out to [facility] Medicine. Orders received for 5 mg hydralazine now. 0413  Hydralazine given    0445  BP on recheck 189/80. Orders received for another 5 mg hydralazine now. 0525  Hydralazine given     0600  BP on recheck 176/88. MD notified. No new orders at this time. 0700  Bedside shift change report given to Gail Polo RN (oncoming nurse) by Eduard Maldonado (offgoing nurse). Report included the following information SBAR, Kardex, ED Summary, Intake/Output, and Recent Results. This patient was assisted with Intentional Toileting every 2 hours during this shift as appropriate. Documentation of ambulation and output reflected on Flowsheet as appropriate. Purposeful hourly rounding was completed using AIDET and 5Ps. Outcomes of PHR documented as they occurred. Bed alarm in use as appropriate. Dual Suction and ambubag in place.

## 2022-09-29 NOTE — ED NOTES
Verbal shift change report given to LINDA DAVIS (oncoming nurse) by Val Herbert RN (offgoing nurse). Report included the following information SBAR, Kardex, MAR and Recent Results.

## 2022-09-29 NOTE — ED NOTES
TRANSFER - OUT REPORT:    Verbal report given to Vonnie(name) on Alan Elliott  being transferred to [facility](unit) for routine progression of care       Report consisted of patients Situation, Background, Assessment and   Recommendations(SBAR). Information from the following report(s) SBAR, Kardex, ED Summary, Intake/Output, MAR, Recent Results, and Cardiac Rhythm NS  was reviewed with the receiving nurse. Lines:   Extended Dwell Peripheral IV (Active)   Criteria for Appropriate Use Limited/no vessel suitable for conventional peripheral access 09/28/22 1230   Site Assessment Clean, dry, & intact 09/28/22 1230   Phlebitis Assessment 0 09/28/22 1230   Infiltration Assessment 0 09/28/22 1230   External Catheter Length (cm) 0 centimeters 09/28/22 1230   Line Status Blood return noted;Capped;Flushed;Normal saline locked 09/28/22 1230   Alcohol Cap Used Yes 09/28/22 1230   Date of Last Dressing Change 09/28/22 09/28/22 1230   Dressing Type Stabilization/securement device; Topical skin adhesive;Transparent 09/28/22 1230   Dressing Status Clean, dry, & intact;New;Occlusive 09/28/22 1230   Dressing Intervention New dressing 09/28/22 1230        Opportunity for questions and clarification was provided.       Patient transported with:   Monitor  Registered Nurse

## 2022-09-29 NOTE — PROGRESS NOTES
NUTRITION     Nutrition screening referral was triggered based on results obtained during nursing admission assessment for Enteral/Parenteral Nutrition PTA    Pt NPO for possible MRI and plan for Kyphoplasty. On droplet plus precautions - rule out. No results yet. Was Covid 19 + on 9/20 per chart. Wt Readings from Last 10 Encounters:   09/28/22 68.9 kg (152 lb)   09/23/22 69.4 kg (153 lb)   08/22/22 68.9 kg (152 lb)   07/26/22 69.4 kg (153 lb)   08/20/21 68.5 kg (151 lb)   05/06/21 70.3 kg (155 lb)   08/29/19 69.3 kg (152 lb 12.8 oz)   02/28/19 70.3 kg (155 lb)   07/12/18 71.7 kg (158 lb)   03/19/18 72.5 kg (159 lb 12.8 oz)     Wt stable. The patient's chart was reviewed and nutrition assessment is not indicated at this time. Plan to see patient for rescreen as indicated. Thank you.      Brian Ortiz RD

## 2022-09-29 NOTE — PROGRESS NOTES
MRI reviewed patient with acute L1 compression fracture. Patient also has severe central stenosis with grade 1 spondylolisthesis at L3-L4. Severe central stenosis also at L4-L5. FP planning for L1 kyphoplasty today. Can perform PT prior to kyphoplasty from orthopedic standpoint. TLSO is best option if patient refuses kyphoplasty as LSO is not going to provide adequate stability. Her age limits how long or if we should brace her. They can slow mobility and be a fall hazard while trying to stand. Orthopedics will followup tomorrow once kyphoplasty is complete. Added bladder scans today.     Radha Rosario PA-C  [facility]  [facility]

## 2022-09-29 NOTE — PROGRESS NOTES
Physical Therapy    Consult received, chart reviewed. Patient seen by ortho who have ordered bedrest while awaiting MRI and potential plan for kyphoplasty. Will defer and follow when appropriate.     Jag Park, MS, PT

## 2022-09-29 NOTE — PROGRESS NOTES
9/30/22  2:32 PM    Care Management Daily Progress Note:    1). Hypertensive urgency  2). Closed compression fracture of body of L1 vertebra (HCC)  3). COVID rule out    RUR: 10%  Level: Low  LOS: 1D    Transition of Care:  1). Continues to require medical management  2). PT/OT consulted- patient was bedrest at time of eval- will follow back up  3). Kyroplasty today  4).  CM following for dc needs    [facility]

## 2022-09-30 ENCOUNTER — APPOINTMENT (OUTPATIENT)
Dept: INTERVENTIONAL RADIOLOGY/VASCULAR | Age: 87
DRG: 515 | End: 2022-09-30
Attending: FAMILY MEDICINE
Payer: MEDICARE

## 2022-09-30 LAB
ANION GAP SERPL CALC-SCNC: 5 MMOL/L (ref 5–15)
BASOPHILS # BLD: 0 K/UL (ref 0–0.1)
BASOPHILS NFR BLD: 0 % (ref 0–1)
BUN SERPL-MCNC: 9 MG/DL (ref 6–20)
BUN/CREAT SERPL: 20 (ref 12–20)
CALCIUM SERPL-MCNC: 8.8 MG/DL (ref 8.5–10.1)
CHLORIDE SERPL-SCNC: 107 MMOL/L (ref 97–108)
CO2 SERPL-SCNC: 26 MMOL/L (ref 21–32)
CREAT SERPL-MCNC: 0.46 MG/DL (ref 0.55–1.02)
DIFFERENTIAL METHOD BLD: ABNORMAL
EOSINOPHIL # BLD: 0 K/UL (ref 0–0.4)
EOSINOPHIL NFR BLD: 0 % (ref 0–7)
ERYTHROCYTE [DISTWIDTH] IN BLOOD BY AUTOMATED COUNT: 14.1 % (ref 11.5–14.5)
GLUCOSE SERPL-MCNC: 116 MG/DL (ref 65–100)
HCT VFR BLD AUTO: 32.6 % (ref 35–47)
HGB BLD-MCNC: 10.8 G/DL (ref 11.5–16)
IMM GRANULOCYTES # BLD AUTO: 0 K/UL
IMM GRANULOCYTES NFR BLD AUTO: 0 %
LYMPHOCYTES # BLD: 1.7 K/UL (ref 0.8–3.5)
LYMPHOCYTES NFR BLD: 59 % (ref 12–49)
MCH RBC QN AUTO: 28.9 PG (ref 26–34)
MCHC RBC AUTO-ENTMCNC: 33.1 G/DL (ref 30–36.5)
MCV RBC AUTO: 87.2 FL (ref 80–99)
MONOCYTES # BLD: 0.1 K/UL (ref 0–1)
MONOCYTES NFR BLD: 5 % (ref 5–13)
NEUTS BAND NFR BLD MANUAL: 1 % (ref 0–6)
NEUTS SEG # BLD: 1 K/UL (ref 1.8–8)
NEUTS SEG NFR BLD: 35 % (ref 32–75)
NRBC # BLD: 0 K/UL (ref 0–0.01)
NRBC BLD-RTO: 0 PER 100 WBC
PLATELET # BLD AUTO: 174 K/UL (ref 150–400)
PMV BLD AUTO: 9.2 FL (ref 8.9–12.9)
POTASSIUM SERPL-SCNC: 3.8 MMOL/L (ref 3.5–5.1)
RBC # BLD AUTO: 3.74 M/UL (ref 3.8–5.2)
RBC MORPH BLD: ABNORMAL
SODIUM SERPL-SCNC: 138 MMOL/L (ref 136–145)
WBC # BLD AUTO: 2.8 K/UL (ref 3.6–11)
WBC MORPH BLD: ABNORMAL

## 2022-09-30 PROCEDURE — 0QU03JZ SUPPLEMENT LUMBAR VERTEBRA WITH SYNTHETIC SUBSTITUTE, PERCUTANEOUS APPROACH: ICD-10-PCS | Performed by: STUDENT IN AN ORGANIZED HEALTH CARE EDUCATION/TRAINING PROGRAM

## 2022-09-30 PROCEDURE — 97530 THERAPEUTIC ACTIVITIES: CPT

## 2022-09-30 PROCEDURE — 74011000250 HC RX REV CODE- 250: Performed by: STUDENT IN AN ORGANIZED HEALTH CARE EDUCATION/TRAINING PROGRAM

## 2022-09-30 PROCEDURE — 80048 BASIC METABOLIC PNL TOTAL CA: CPT

## 2022-09-30 PROCEDURE — 2709999900 HC NON-CHARGEABLE SUPPLY

## 2022-09-30 PROCEDURE — 74011250636 HC RX REV CODE- 250/636

## 2022-09-30 PROCEDURE — 22514 PERQ VERTEBRAL AUGMENTATION: CPT

## 2022-09-30 PROCEDURE — 77030029065 HC DRSG HEMO QCLOT ZMED -B

## 2022-09-30 PROCEDURE — 99232 SBSQ HOSP IP/OBS MODERATE 35: CPT | Performed by: FAMILY MEDICINE

## 2022-09-30 PROCEDURE — 74011000636 HC RX REV CODE- 636: Performed by: STUDENT IN AN ORGANIZED HEALTH CARE EDUCATION/TRAINING PROGRAM

## 2022-09-30 PROCEDURE — 97161 PT EVAL LOW COMPLEX 20 MIN: CPT

## 2022-09-30 PROCEDURE — 97116 GAIT TRAINING THERAPY: CPT

## 2022-09-30 PROCEDURE — 36415 COLL VENOUS BLD VENIPUNCTURE: CPT

## 2022-09-30 PROCEDURE — 74011250636 HC RX REV CODE- 250/636: Performed by: STUDENT IN AN ORGANIZED HEALTH CARE EDUCATION/TRAINING PROGRAM

## 2022-09-30 PROCEDURE — 77030021783 HC SYS CEM DEL MEDT -D

## 2022-09-30 PROCEDURE — 74011250637 HC RX REV CODE- 250/637: Performed by: STUDENT IN AN ORGANIZED HEALTH CARE EDUCATION/TRAINING PROGRAM

## 2022-09-30 PROCEDURE — 77030034842 HC TAMP SPN BN INFL EXP II KYPH -I

## 2022-09-30 PROCEDURE — 77030003666 HC NDL SPINAL BD -A

## 2022-09-30 PROCEDURE — 99152 MOD SED SAME PHYS/QHP 5/>YRS: CPT

## 2022-09-30 PROCEDURE — 77030021782 HC SYS CEM CART DEL KYPH -C

## 2022-09-30 PROCEDURE — 0QS03ZZ REPOSITION LUMBAR VERTEBRA, PERCUTANEOUS APPROACH: ICD-10-PCS | Performed by: STUDENT IN AN ORGANIZED HEALTH CARE EDUCATION/TRAINING PROGRAM

## 2022-09-30 PROCEDURE — C1713 ANCHOR/SCREW BN/BN,TIS/BN: HCPCS

## 2022-09-30 PROCEDURE — 74011250637 HC RX REV CODE- 250/637: Performed by: FAMILY MEDICINE

## 2022-09-30 PROCEDURE — 85025 COMPLETE CBC W/AUTO DIFF WBC: CPT

## 2022-09-30 PROCEDURE — 65270000046 HC RM TELEMETRY

## 2022-09-30 RX ORDER — KETOROLAC TROMETHAMINE 30 MG/ML
15 INJECTION, SOLUTION INTRAMUSCULAR; INTRAVENOUS ONCE
Status: DISCONTINUED | OUTPATIENT
Start: 2022-09-30 | End: 2022-09-30 | Stop reason: HOSPADM

## 2022-09-30 RX ORDER — FENTANYL CITRATE 50 UG/ML
25-200 INJECTION, SOLUTION INTRAMUSCULAR; INTRAVENOUS
Status: DISCONTINUED | OUTPATIENT
Start: 2022-09-30 | End: 2022-09-30 | Stop reason: HOSPADM

## 2022-09-30 RX ORDER — MIDAZOLAM HYDROCHLORIDE 1 MG/ML
.5-1 INJECTION, SOLUTION INTRAMUSCULAR; INTRAVENOUS
Status: DISCONTINUED | OUTPATIENT
Start: 2022-09-30 | End: 2022-09-30 | Stop reason: HOSPADM

## 2022-09-30 RX ORDER — LIDOCAINE HYDROCHLORIDE 10 MG/ML
10-30 INJECTION INFILTRATION; PERINEURAL
Status: DISCONTINUED | OUTPATIENT
Start: 2022-09-30 | End: 2022-09-30 | Stop reason: HOSPADM

## 2022-09-30 RX ORDER — BUPIVACAINE HYDROCHLORIDE 7.5 MG/ML
5-10 INJECTION, SOLUTION EPIDURAL; RETROBULBAR ONCE
Status: COMPLETED | OUTPATIENT
Start: 2022-09-30 | End: 2022-09-30

## 2022-09-30 RX ORDER — DIPHENHYDRAMINE HYDROCHLORIDE 50 MG/ML
25 INJECTION, SOLUTION INTRAMUSCULAR; INTRAVENOUS ONCE
Status: COMPLETED | OUTPATIENT
Start: 2022-09-30 | End: 2022-09-30

## 2022-09-30 RX ORDER — HYDRALAZINE HYDROCHLORIDE 20 MG/ML
10 INJECTION INTRAMUSCULAR; INTRAVENOUS ONCE
Status: COMPLETED | OUTPATIENT
Start: 2022-09-30 | End: 2022-09-30

## 2022-09-30 RX ORDER — CEFAZOLIN SODIUM 1 G/3ML
2 INJECTION, POWDER, FOR SOLUTION INTRAMUSCULAR; INTRAVENOUS ONCE
Status: COMPLETED | OUTPATIENT
Start: 2022-09-30 | End: 2022-09-30

## 2022-09-30 RX ADMIN — SODIUM CHLORIDE 100 ML/HR: 9 INJECTION, SOLUTION INTRAVENOUS at 08:41

## 2022-09-30 RX ADMIN — HYDRALAZINE HYDROCHLORIDE 10 MG: 20 INJECTION, SOLUTION INTRAMUSCULAR; INTRAVENOUS at 21:41

## 2022-09-30 RX ADMIN — AMLODIPINE BESYLATE 5 MG: 5 TABLET ORAL at 09:04

## 2022-09-30 RX ADMIN — MIDAZOLAM 1 MG: 1 INJECTION INTRAMUSCULAR; INTRAVENOUS at 11:25

## 2022-09-30 RX ADMIN — ACETAMINOPHEN 1000 MG: 500 TABLET, FILM COATED ORAL at 21:42

## 2022-09-30 RX ADMIN — LOSARTAN POTASSIUM 100 MG: 50 TABLET, FILM COATED ORAL at 09:04

## 2022-09-30 RX ADMIN — CEFAZOLIN 2 G: 1 INJECTION, POWDER, FOR SOLUTION INTRAMUSCULAR; INTRAVENOUS at 10:49

## 2022-09-30 RX ADMIN — FENTANYL CITRATE 50 MCG: 50 INJECTION INTRAMUSCULAR; INTRAVENOUS at 10:52

## 2022-09-30 RX ADMIN — LIDOCAINE HYDROCHLORIDE 7 ML: 10 INJECTION, SOLUTION EPIDURAL; INFILTRATION; INTRACAUDAL; PERINEURAL at 11:30

## 2022-09-30 RX ADMIN — FENTANYL CITRATE 25 MCG: 50 INJECTION INTRAMUSCULAR; INTRAVENOUS at 11:45

## 2022-09-30 RX ADMIN — Medication 10 ML: at 21:42

## 2022-09-30 RX ADMIN — ACETAMINOPHEN 1000 MG: 500 TABLET, FILM COATED ORAL at 08:47

## 2022-09-30 RX ADMIN — IOPAMIDOL 30 ML: 408 INJECTION, SOLUTION INTRATHECAL at 11:39

## 2022-09-30 RX ADMIN — Medication 10 ML: at 06:00

## 2022-09-30 RX ADMIN — MIDAZOLAM 1 MG: 1 INJECTION INTRAMUSCULAR; INTRAVENOUS at 11:32

## 2022-09-30 RX ADMIN — LABETALOL HYDROCHLORIDE 20 MG: 5 INJECTION, SOLUTION INTRAVENOUS at 15:26

## 2022-09-30 RX ADMIN — FENTANYL CITRATE 25 MCG: 50 INJECTION INTRAMUSCULAR; INTRAVENOUS at 11:32

## 2022-09-30 RX ADMIN — DIPHENHYDRAMINE HYDROCHLORIDE 25 MG: 50 INJECTION, SOLUTION INTRAMUSCULAR; INTRAVENOUS at 11:00

## 2022-09-30 RX ADMIN — ACETAMINOPHEN 1000 MG: 500 TABLET, FILM COATED ORAL at 15:13

## 2022-09-30 RX ADMIN — BUPIVACAINE HYDROCHLORIDE 20 ML: 7.5 INJECTION, SOLUTION EPIDURAL; RETROBULBAR at 11:38

## 2022-09-30 RX ADMIN — HYDROMORPHONE HYDROCHLORIDE 0.2 MG: 1 INJECTION, SOLUTION INTRAMUSCULAR; INTRAVENOUS; SUBCUTANEOUS at 03:59

## 2022-09-30 RX ADMIN — LABETALOL HYDROCHLORIDE 20 MG: 5 INJECTION, SOLUTION INTRAVENOUS at 08:42

## 2022-09-30 RX ADMIN — Medication 10 ML: at 15:14

## 2022-09-30 RX ADMIN — THERA TABS 1 TABLET: TAB at 09:04

## 2022-09-30 NOTE — PROGRESS NOTES
Patient for kyphoplasty today. Orthopedics will followup tomorrow to check on status. Perfect[employer]erve with questions.       Isma Pollock PA-C  [facility]  [address] [facility]

## 2022-09-30 NOTE — MED STUDENT NOTES
*ATTENTION:  This note has been created by a medical student for educational purposes only. Please do not refer to the content of this note for clinical decision-making, billing, or other purposes. Please see attending physicians note to obtain clinical information on this patient. *               Subjective / Objective     24 Hour Events:  Elevated blood pressures ON, given Labetolol 20 and Hydral 10 with improvement    Subjective: Patient seen and examined at bedside. Denies CP, SOB, fever/chills, n/v, abdominal pain. Endorses improved pain, and feels bettter. Temp (24hrs), Av °F (36.7 °C), Min:97.1 °F (36.2 °C), Max:98.4 °F (36.9 °C)       Physical Exam  General: No acute distress. Alert. Cooperative. Head: Normocephalic. Atraumatic. Mouth: Mucosa pink. Moist mucous membranes. Respiratory: No increased work of breathing. Symmetric chest rise b/l. Cardiovascular: RRR. No clubbing or cyanosis  GI: Nondistended. No masses. Extremities: No LE edema. Moving all extremities spontaneously. Musculoskeletal: Full ROM in all extremities, no obvious deformities. Skin: Warm, dry. No rashes. Neuro: Alert, normal behavior. No focal deficit. Respiratory:   O2 Device: None (Room air)     I/O:  Date 22 - 22 0659 22 07 - 10/01/22 0659   Shift 5593-5716 8390-9442 24 Hour Total 3206-8110 2199-0539 24 Hour Total   INTAKE   P.O. 360  360        P. O. 360  360      Shift Total(mL/kg) 360(5.2)  360(5.2)      OUTPUT   Urine(mL/kg/hr)  450(0.5) 450(0.3) 200  200     Urine Voided  450 450 200  200   Shift Total(mL/kg)  450(6.5) 450(6.5) 200(2.9)  200(2.9)    -450 -90 -200  -200   Weight (kg) 68.9 68.9 68.9 68.9 68.9 68.9       Inpatient Medications  Current Facility-Administered Medications   Medication Dose Route Frequency    bupivacaine (PF) (MARCAINE) 0.75 % (7.5 mg/mL) injection 37.5-75 mg  5-10 mL Epidural ONCE    fentaNYL citrate (PF) injection  mcg   mcg IntraVENous Multiple    iopamidoL (ISOVUE-M 200) 41 % contrast solution 2 mL  2 mL IntraSPINal RAD ONCE    lidocaine (XYLOCAINE) 10 mg/mL (1 %) injection 10-30 mL  10-30 mL SubCUTAneous Multiple    midazolam (VERSED) injection 0.5-10 mg  0.5-10 mg IntraVENous Multiple    ketorolac (TORADOL) injection 15 mg  15 mg IntraVENous ONCE    therapeutic multivitamin (THERAGRAN) tablet 1 Tablet  1 Tablet Oral DAILY    labetaloL (NORMODYNE;TRANDATE) 20 mg/4 mL (5 mg/mL) injection 20 mg  20 mg IntraVENous Q6H PRN    acetaminophen (TYLENOL) tablet 1,000 mg  1,000 mg Oral TID    HYDROmorphone (DILAUDID) syringe 0.2 mg  0.2 mg IntraVENous Q4H PRN    amLODIPine (NORVASC) tablet 5 mg  5 mg Oral DAILY    losartan (COZAAR) tablet 100 mg  100 mg Oral DAILY    sodium chloride (NS) flush 5-40 mL  5-40 mL IntraVENous Q8H    sodium chloride (NS) flush 5-40 mL  5-40 mL IntraVENous PRN    acetaminophen (TYLENOL) tablet 650 mg  650 mg Oral Q6H PRN    Or    acetaminophen (TYLENOL) suppository 650 mg  650 mg Rectal Q6H PRN    lidocaine 4 % patch 1 Patch  1 Patch TransDERmal Q24H    0.9% sodium chloride infusion  100 mL/hr IntraVENous CONTINUOUS         Allergies  Allergies   Allergen Reactions    Hyzaar [Losartan-Hydrochlorothiazide] Vertigo    Pseudoephedrine Other (comments)     Rapid heart rate. Naprosyn [Naproxen] Not Reported This Time         CBC:  Recent Labs     09/30/22  0347 09/29/22  0231 09/28/22  0950   WBC 2.8* 2.7* 2.3*   HGB 10.8* 11.3* 12.3   HCT 32.6* 34.2* 36.5    183 652       Metabolic Panel:  Recent Labs     09/30/22  0347 09/29/22  0231 09/28/22  0950    139 139   K 3.8 3.5 3.9    107 105   CO2 26 27 30   BUN 9 6 5*   CREA 0.46* 0.53* 0.53*   * 105* 113*   CA 8.8 8.7 9.5   ALB  --   --  3.0*   ALT  --   --  43            Assessment and Plan     Seun Otter is a 80 y.o. female with PMH of HTN and recent covid infection who is admitted for Hypertensive Urgency and L1 compression Fx. Hypertensive Urgency in the setting of chronic HTN: Completely asymptomatic. POA /109. Likely 2/2 to pain and missed dose. Did not take med today. Has been uncontrolled lately as OP. -160s. CTA chest w/o AD. EKG w/o MI. Normal Cr. S/p Labetalol 10 mg IV x1. Elevated blood pressure likely secondary to pain, but does have elevated blood pressures at home and outpatient. - Continue Telemetry  - Vital per unit  - Home Losartan 100 mg PO daily   - Continue Amlodipine 5 mg. If high Bps persist post procedure, will increase to 10 mg  - Labetalol 20 mg prn for SBP >180 or DBP > 110  - Will continue to monitor at this time and readjust as BP's trend. Back pain 2/2 L1 Compression Fracture: GLD on 9/23. MRI: Compression deformity without significant canal compromise L1. 2. Edema in the superior endplate of L4. 3. Severe spinal canal stenosis L3 L5. Pain improved after morphine.   - Morphine 2 mg q4hrs as neded  - Tylenol 650 mng prn   - IR consulted; will perform kyphoplasty today  - Ortho following, appreciate recs     Covid infection: Asx. Positive on 9/20,   - Droplet precautions. FEN/GI: Regular diet once post procedure. NS at 110 mL/hr. Activity: Bed rest  DVT prophylaxis: SCDs  GI prophylaxis:  Not indicated   Disposition: Plan to d/c to TBD. PT/OT consulted.   POC: Toni Sandoval - daughter [phone]      CODE STATUS:  Full Code            I appreciate the opportunity to participate in the care of this patient,  [address] [place] [address]       For Billing    Chief Complaint   Patient presents with    Back Pain    Hypertension       Hospital Problems  Date Reviewed: 8/25/2022            Codes Class Noted POA    * (Principal) Hypertensive urgency ICD-10-CM: I16.0  ICD-9-CM: 401.9  9/28/2022 Unknown        Compression fracture of L1 vertebra ([facility] [address] ICD-10-CM: S32.010A  ICD-9-CM: 805.4  9/28/2022 Unknown        COVID-19 virus infection ICD-10-CM: U07.1  ICD-9-CM: 079.89 9/20/2022 Unknown

## 2022-09-30 NOTE — PROGRESS NOTES
TRANSFER - OUT REPORT:    Verbal report given to [facility] RADHA RN (name) on Antonio Button being transferred to [facility] to recover in angio due to COVID(unit) for routine progression of care       Report consisted of patient's Situation, Background, Assessment and   Recommendations(SBAR). Information from the following report(s) SBAR was reviewed with the receiving nurse. Opportunity for questions and clarification was provided.       Patient transported with:   Registered Nurse

## 2022-09-30 NOTE — H&P
Interventional Radiology  Procedure Note        9/30/2022 12:36 PM    Patient: Justus Montero     Informed consent obtained    Diagnosis: :L1 compression fracture    Procedure(s): L1 kyphoplastyh    Specimens removed:  none    Complications: None    Primary Physician: Juaquin Franks MD    Recommendations: N/A    Discharge Disposition: return to floor    Full dictated report to follow    Juaquin Franks MD  Interventional Radiology  [address] [facility], [place] [facility].  12:36 PM, 9/30/2022

## 2022-09-30 NOTE — PROGRESS NOTES
0700  Bedside and Verbal shift change report given to Suyapa Lara RN (oncoming nurse) by Kathrin Merritt RN (offgoing nurse). Report included the following information SBAR, Kardex, Procedure Summary, Intake/Output, MAR, Recent Results, and Med Rec Status. 0900  Called  Practice re: Pt swcheduled PO meds. Order is for NPO. Per MD ok to give morning PO meds with sips    1900  Bedside and Verbal shift change report given to Eddi Muñoz RN (oncoming nurse) by Suyapa Lara RN (offgoing nurse). Report included the following information SBAR, Kardex, Procedure Summary, Intake/Output, MAR, Recent Results, and Med Rec Status.

## 2022-09-30 NOTE — PROGRESS NOTES
[address] [facility] Skye Quinones    Office [phone]  Fax [phone]     DAILY PROGRESS NOTE    24 Hour Events: BP elevated overnight to 180/87, given Labetalol. Still elevated at 195/81, administered Hydralazine, BP down to 157/75. SUBJECTIVE: Patient doing better this morning. Pain 5/10. Denies chest pain, SOB, nausea, vomiting, abdominal pain, dizziness. OBJECTIVE:      Vitals: Visit Vitals  BP (!) 175/79 (BP 1 Location: Right upper arm, BP Patient Position: At rest)   Pulse 87   Temp 98.3 °F (36.8 °C)   Resp 14   Ht 5' 6\" (1.676 m)   Wt 152 lb (68.9 kg)   SpO2 93%   BMI 24.53 kg/m²     Physical Exam:  General: NAD. Respiratory: CTAB. Cardiovascular: Regular rate. GI: Nondistended. + bowel sounds. Extremities: No LE edema. Skin: Warm, dry. Neuro: Alert and oriented x3    I/O:  Date 09/29/22 0700 - 09/30/22 0659 09/30/22 0700 - 10/01/22 0659   Shift [id number] 5616-2255 24 Hour Total 7115-5774 7951-8621 24 Hour Total   INTAKE   P.O. 360  360        P. O. 360  360      Shift Total(mL/kg) 360(5.2)  360(5.2)      OUTPUT   Urine(mL/kg/hr)  450(0.5) 450(0.3)        Urine Voided  450 450      Shift Total(mL/kg)  450(6.5) 450(6.5)       -450 -90      Weight (kg) 68.9 68.9 68.9 68.9 68.9 68.9       Inpatient Medications  Current Facility-Administered Medications   Medication Dose Route Frequency    therapeutic multivitamin (THERAGRAN) tablet 1 Tablet  1 Tablet Oral DAILY    labetaloL (NORMODYNE;TRANDATE) 20 mg/4 mL (5 mg/mL) injection 20 mg  20 mg IntraVENous Q6H PRN    acetaminophen (TYLENOL) tablet 1,000 mg  1,000 mg Oral TID    HYDROmorphone (DILAUDID) syringe 0.2 mg  0.2 mg IntraVENous Q4H PRN    amLODIPine (NORVASC) tablet 5 mg  5 mg Oral DAILY    losartan (COZAAR) tablet 100 mg  100 mg Oral DAILY    sodium chloride (NS) flush 5-40 mL  5-40 mL IntraVENous Q8H    sodium chloride (NS) flush 5-40 mL  5-40 mL IntraVENous PRN    acetaminophen (TYLENOL) tablet 650 mg  650 mg Oral Q6H PRN    Or    acetaminophen (TYLENOL) suppository 650 mg  650 mg Rectal Q6H PRN    lidocaine 4 % patch 1 Patch  1 Patch TransDERmal Q24H    0.9% sodium chloride infusion  100 mL/hr IntraVENous CONTINUOUS       Allergies  Allergies   Allergen Reactions    Hyzaar [Losartan-Hydrochlorothiazide] Vertigo    Pseudoephedrine Other (comments)     Rapid heart rate. Naprosyn [Naproxen] Not Reported This Time       CBC:  Recent Labs     09/30/22 0347 09/29/22  0231 09/28/22  0950   WBC 2.8* 2.7* 2.3*   HGB 10.8* 11.3* 12.3   HCT 32.6* 34.2* 36.5    183 193       Metabolic Panel:  Recent Labs     09/30/22 0347 09/29/22  0231 09/28/22  0950    139 139   K 3.8 3.5 3.9    107 105   CO2 26 27 30   BUN 9 6 5*   CREA 0.46* 0.53* 0.53*   * 105* 113*   CA 8.8 8.7 9.5   ALB  --   --  3.0*   ALT  --   --  43            Assessment and Plan     Joanna Mike is a 80 y.o. female with a PMH of HTN and recent covid infection who is admitted for Hypertensive Urgency and L1 compression Fx. [employer] Hypertensive Urgency in the setting of chronic HTN: Completely asymptomatic. POA /109. Likely 2/2 to pain and missed dose. Did not take med today. Has been uncontrolled lately as OP. -160s. CTA chest w/o AD. EKG w/o MI. Normal Cr. S/p Labetalol 10 mg IV x1.   - NPO since Midnight  - MIVF   - Home Losartan 100 mg PO daily   - Started patient on Amlodipine 5 mg QD  - Labetalol 20 mg prn for SBP >180 or DBP > 110  - Will continue to monitor at this time and readjust as BP's trend. Back pain 2/2 L1 Compression Fracture: GLD on 9/23. MRI: Compression deformity without significant canal compromise L1. 2. Edema in the superior endplate of L4. 3. Severe spinal canal stenosis L3 L5. Pain improved after morphine.   - Morphine 2 mg q4hrs as neded  - Tylenol 650 mng prn   - Ortho consulted- Pt may be a good candidate for kyphoplasty. Appreciate recs. - IR consulted    Covid infection: Asx.  Positive on 9/20,   - Droplet precautions. FEN/GI: Regular diet > NPO. NS at 110 mL/hr. Activity: Bed rest  DVT prophylaxis: SCDs  GI prophylaxis:  Not indicated   Disposition: Plan to d/c to TBD. PT/OT consulted.   POC: Radha Roberson - daughter [phone]     CODE STATUS:  Full Code       Felecia Willoughby MD  Family Medicine Resident       For Billing    Chief Complaint   Patient presents with    Back Pain    Hypertension       Hospital Problems  Date Reviewed: 8/25/2022            Codes Class Noted POA    * (Principal) Hypertensive urgency ICD-10-CM: I16.0  ICD-9-CM: 401.9  9/28/2022 Unknown        Compression fracture of L1 vertebra ([facility] [address] ICD-10-CM: S32.010A  ICD-9-CM: 805.4  9/28/2022 Unknown        COVID-19 virus infection ICD-10-CM: U07.1  ICD-9-CM: 079.89  9/20/2022 Unknown

## 2022-09-30 NOTE — H&P
Radiology History and Physical    Patient: Izaiah Villela 80 y.o. female       Chief Complaint: Back Pain and Hypertension    History of Present Illness: 80year old woman with L1 compression fracture after ground level fall one week ago. History:    Past Medical History:   Diagnosis Date    Anemia     Sees Dr. Hans Mccurdy and is on a prescription multivitamin call Multigen which has corrected her anemia    Headache     Hypertension     Intracranial hypotension 1998    had blood patch to correct    Meningitis 1950 & 2011    Osteochondritis 1975    Snoring      Family History   Problem Relation Age of Onset    Lung Disease Brother         lung cancer    Diabetes Mother     Thyroid Disease Mother         goiter    Diabetes Sister     Heart Disease Sister     Heart Disease Father     Stroke Father     Hypertension Brother     Stroke Brother     Cancer Brother     Parkinson's Disease Sister     Other Sister         brain aneurysm    Alzheimer's Disease Sister      Social History     Socioeconomic History    Marital status:      Spouse name: Not on file    Number of children: Not on file    Years of education: Not on file    Highest education level: Not on file   Occupational History    Not on file   Tobacco Use    Smoking status: Former    Smokeless tobacco: Never   Substance and Sexual Activity    Alcohol use: Yes     Alcohol/week: 1.0 standard drink     Types: 1 Glasses of wine per week    Drug use: No    Sexual activity: Never   Other Topics Concern    Not on file   Social History Narrative    Not on file     Social Determinants of Health     Financial Resource Strain: Not on file   Food Insecurity: Not on file   Transportation Needs: Not on file   Physical Activity: Not on file   Stress: Not on file   Social Connections: Not on file   Intimate Partner Violence: Not on file   Housing Stability: Not on file       Allergies:    Allergies   Allergen Reactions    Hyzaar [Losartan-Hydrochlorothiazide] Vertigo Pseudoephedrine Other (comments)     Rapid heart rate. Naprosyn [Naproxen] Not Reported This Time       Current Medications:  Current Facility-Administered Medications   Medication Dose Route Frequency    bupivacaine (PF) (MARCAINE) 0.75 % (7.5 mg/mL) injection 37.5-75 mg  5-10 mL Epidural ONCE    ceFAZolin (ANCEF) injection 2 g  2 g IntraVENous ONCE    fentaNYL citrate (PF) injection  mcg   mcg IntraVENous Multiple    iopamidoL (ISOVUE-M 200) 41 % contrast solution 2 mL  2 mL IntraSPINal RAD ONCE    lidocaine (XYLOCAINE) 10 mg/mL (1 %) injection 10-30 mL  10-30 mL SubCUTAneous Multiple    midazolam (VERSED) injection 0.5-10 mg  0.5-10 mg IntraVENous Multiple    diphenhydrAMINE (BENADRYL) injection 25 mg  25 mg IntraVENous ONCE    ketorolac (TORADOL) injection 15 mg  15 mg IntraVENous ONCE    therapeutic multivitamin (THERAGRAN) tablet 1 Tablet  1 Tablet Oral DAILY    labetaloL (NORMODYNE;TRANDATE) 20 mg/4 mL (5 mg/mL) injection 20 mg  20 mg IntraVENous Q6H PRN    acetaminophen (TYLENOL) tablet 1,000 mg  1,000 mg Oral TID    HYDROmorphone (DILAUDID) syringe 0.2 mg  0.2 mg IntraVENous Q4H PRN    amLODIPine (NORVASC) tablet 5 mg  5 mg Oral DAILY    losartan (COZAAR) tablet 100 mg  100 mg Oral DAILY    sodium chloride (NS) flush 5-40 mL  5-40 mL IntraVENous Q8H    sodium chloride (NS) flush 5-40 mL  5-40 mL IntraVENous PRN    acetaminophen (TYLENOL) tablet 650 mg  650 mg Oral Q6H PRN    Or    acetaminophen (TYLENOL) suppository 650 mg  650 mg Rectal Q6H PRN    lidocaine 4 % patch 1 Patch  1 Patch TransDERmal Q24H    0.9% sodium chloride infusion  100 mL/hr IntraVENous CONTINUOUS        Physical Exam:  Blood pressure (!) 194/89, pulse 85, temperature 98.2 °F (36.8 °C), resp. rate 16, height 5' 6\" (1.676 m), weight 68.9 kg (152 lb), SpO2 92 %.   GENERAL: alert, cooperative, no distress, appears stated age,   LUNG: Nonlabored respiration on room air  HEART: regular rate and rhythm    Alerts:    Hospital Problems  Date Reviewed: 8/25/2022            Codes Class Noted POA    * (Principal) Hypertensive urgency ICD-10-CM: I16.0  ICD-9-CM: 401.9  9/28/2022 Unknown        Compression fracture of L1 vertebra (HCC) ICD-10-CM: S32.010A  ICD-9-CM: 805.4  9/28/2022 Unknown        COVID-19 virus infection ICD-10-CM: U07.1  ICD-9-CM: 079.89  9/20/2022 Unknown           Laboratory:      Recent Labs     09/30/22  0347   HGB 10.8*   HCT 32.6*   WBC 2.8*      BUN 9   CREA 0.46*   K 3.8         Plan of Care/Planned Procedure:  Risks, benefits, and alternatives reviewed with patient and she agrees to proceed with the procedure. L1 kyphoplasty    Deemed appropriate for moderate sedation with versed and fentanyl.     Gonzalo Sanchez MD  Interventional Radiology  [facility]  10:45 AM, 9/30/2022

## 2022-09-30 NOTE — PROGRESS NOTES
Occupational Therapy  Chart reviewed for evaluation, to have kyphoplasty this date, will defer and follow up tomorrow.    Dottie Walker OTR/L

## 2022-09-30 NOTE — PROGRESS NOTES
9/30/2022  11:09 AM  Pt discussed in IDR is continuing to require medical management for Hypertensive Urgency in the setting of chronic HTN, Back pain 2/2 L1 Compression Fracture: GLD on 9/23, Covid infection.   Transitions of Care Plan:  RUR 11 % Low Risk of Readmission/Green  LOS 2 Days   Medical management continues   Hypertensive urgency   Ortho following, compression fracture of body of L1 vertebra, plan for kyphoplasty today   PT, OT to eval s/p kyphoplasty for skilled needs at DC   Pt will need 2nd IM letter at [address] when stable, dispo pending therapy evals  Pt lives w/ her daughter and son-in-law, was independent at baseline  Outpatient follow up [address], Ortho  Family will transport vs WC Komal Dodson  CM will continue to follow and assist w/ DC needs  DARNELL Díaz

## 2022-09-30 NOTE — PROGRESS NOTES
Problem: Mobility Impaired (Adult and Pediatric)  Goal: *Acute Goals and Plan of Care (Insert Text)  Description: FUNCTIONAL STATUS PRIOR TO ADMISSION: Patient was modified independent using a rolling walker for functional mobility. HOME SUPPORT PRIOR TO ADMISSION: The patient lived with daughter but did not require assist.    Physical Therapy Goals  Initiated 9/30/2022  1. Patient will move from supine to sit and sit to supine , scoot up and down, and roll side to side in bed with modified independence within 7 day(s). 2.  Patient will transfer from bed to chair and chair to bed with modified independence using the least restrictive device within 7 day(s). 3.  Patient will perform sit to stand with modified independence within 7 day(s). 4.  Patient will ambulate with modified independence for 75 feet with the least restrictive device within 7 day(s). 5.  Patient will ascend/descend 6 stairs with 1 handrail(s) with supervision/set-up within 7 day(s). Outcome: Progressing Towards Goal   PHYSICAL THERAPY EVALUATION  Patient: Cherelle Moeller (67 y.o. female)  Date: 9/30/2022  Primary Diagnosis: Hypertensive urgency [I16.0]       Precautions:   Back    ASSESSMENT  Based on the objective data described below, the patient presents with impaired trunk ROM, spinal precautions, 2/10 pain, balance, weakness and gait dysfunction/intolerance causing limited independence with mobility following fall at home with L1 compression fracture. Pt s/p kyphoplasty this morning. Pt received in supine, verbal for log rolling instruction and completes with CGA, overall CGA-Min A for OOB mobility. Ambulates to bathroom with RW and shuffled steps. BP stable and agreeable to increased distance to door and back. Upon turning at doorway pt becoming more lethargic, slow to respond and requiring increased assistance to make it back to the recliner chair. Noted significant drop in BP. RN made aware.  With increased time BP Recovers and pt able to stay up in chair for several minutes with stable BP. Recommend HHPT and family assistance. Will benefit from therapy in acute setting, anticipate will improve tolerance quickly with improved pain control. Sittin/61  Sitting after walkin/42  Reclined: 105/52  Reclined after 4 minutes 117/57  Sitting with legs elevated at end of session: 127/52      Current Level of Function Impacting Discharge (mobility/balance): orthostatic    Functional Outcome Measure: The patient scored 14/28 on the Tinetti outcome measure which is indicative of high fall risk. Other factors to consider for discharge: orthostatic     Patient will benefit from skilled therapy intervention to address the above noted impairments. PLAN :  Recommendations and Planned Interventions: bed mobility training, transfer training, gait training, therapeutic exercises, neuromuscular re-education, patient and family training/education, and therapeutic activities      Frequency/Duration: Patient will be followed by physical therapy:  5x/week to address goals. Recommendation for discharge: (in order for the patient to meet his/her long term goals)  Physical therapy at least 2 days/week in the home     This discharge recommendation:  Has been made in collaboration with the attending provider and/or case management    IF patient discharges home will need the following DME: patient owns DME required for discharge         SUBJECTIVE:   Patient stated I am just a little sluggish.     OBJECTIVE DATA SUMMARY:   HISTORY:    Past Medical History:   Diagnosis Date    Anemia     Sees Dr. Calin Acevedo and is on a prescription multivitamin call Multigen which has corrected her anemia    Headache     Hypertension     Intracranial hypotension     had blood patch to correct    Meningitis  &     Osteochondritis 1975    Snoring      Past Surgical History:   Procedure Laterality Date     [facility]  [facility]    HX OTHER SURGICAL  1975    osteochroditis    HX OTHER SURGICAL  1998    blood patch for intercranial spontaneious hypotension     IR KYPHOPLASTY LUMBAR  9/30/2022       Personal factors and/or comorbidities impacting plan of care: HA, HTN, NPH    Home Situation  Home Environment: Private residence  # Steps to Enter: 6  One/Two Story Residence: One story  Living Alone: No  Support Systems: Child(reynaldo)  Patient Expects to be Discharged to[de-identified] Home with family assistance  Current DME Used/Available at Home: James Silva, quad, James Silva, zainab, Carter Henrández, rollator    EXAMINATION/PRESENTATION/DECISION MAKING:   Critical Behavior:  Neurologic State: Alert, Appropriate for age  Orientation Level: Oriented X4  Cognition: Follows commands     Hearing: Auditory  Auditory Impairment: None  Skin:    Edema:   Range Of Motion:                          Strength:                          Tone & Sensation:                                  Coordination:     Vision:      Functional Mobility:  Bed Mobility:  Rolling: Contact guard assistance  Supine to Sit: Contact guard assistance     Scooting: Contact guard assistance  Transfers:  Sit to Stand: Minimum assistance  Stand to Sit: Contact guard assistance                       Balance:   Sitting: Intact  Standing: Impaired  Standing - Static: Fair  Standing - Dynamic : Fair;Constant support  Ambulation/Gait Training:  Distance (ft): 15 Feet (ft) (additional 25ft)  Assistive Device: Gait belt;Walker, rolling  Ambulation - Level of Assistance: Contact guard assistance;Minimal assistance     Gait Description (WDL): Exceptions to WDL  Gait Abnormalities: Decreased step clearance;Shuffling gait        Base of Support: Narrowed     Speed/Ashwini: Shuffled;Pace decreased (<100 feet/min)  Step Length: Right shortened;Left shortened              Functional Measure:  Tinetti test:    Sitting Balance: 1  Arises: 1  Attempts to Rise: 1  Immediate Standing Balance: 1  Standing Balance: 0  Nudged: 0  Eyes Closed: 0  Turn 360 Degrees - Continuous/Discontinuous: 0  Turn 360 Degrees - Steady/Unsteady: 0  Sitting Down: 1  Balance Score: 5 Balance total score  Indication of Gait: 1  R Step Length/Height: 1  L Step Length/Height: 1  R Foot Clearance: 1  L Foot Clearance: 1  Step Symmetry: 1  Step Continuity: 1  Path: 1  Trunk: 1  Walking Time: 0  Gait Score: 9 Gait total score  Total Score: 14/28 Overall total score         Tinetti Tool Score Risk of Falls  <19 = High Fall Risk  19-24 = Moderate Fall Risk  25-28 = Low Fall Risk  Tinetti ME. Performance-Oriented Assessment of Mobility Problems in Elderly Patients. [facility] 66; [id number].  (Scoring Description: PT Bulletin Feb. 10, 1993)    Older adults: Kristan Johnson et al, 2009; n = 1000 [facility] elderly evaluated with ABC, ARIC, ADL, and IADL)  · Mean ARIC score for males aged 69-68 years = 26.21(3.40)  · Mean ARIC score for females age 69-68 years = 25.16(4.30)  · Mean ARIC score for males over 80 years = 23.29(6.02)  · Mean ARIC score for females over 80 years = 17.20(8.32)            Physical Therapy Evaluation Charge Determination   History Examination Presentation Decision-Making   MEDIUM  Complexity : 1-2 comorbidities / personal factors will impact the outcome/ POC  MEDIUM Complexity : 3 Standardized tests and measures addressing body structure, function, activity limitation and / or participation in recreation  LOW Complexity : Stable, uncomplicated  Other outcome measures Tinetti  LOW       Based on the above components, the patient evaluation is determined to be of the following complexity level: LOW     Pain Rating:  none    Activity Tolerance:   signs and symptoms of orthostatic hypotension    After treatment patient left in no apparent distress:   Sitting in chair, Call bell within reach, Bed / chair alarm activated, and Caregiver / family present    COMMUNICATION/EDUCATION:   The patients plan of care was discussed with: Sae nurse.     Fall prevention education was provided and the patient/caregiver indicated understanding., Patient/family have participated as able in goal setting and plan of care. , and Patient/family agree to work toward stated goals and plan of care.     Thank you for this referral.  Ranjit Engel, PT   Time Calculation: 44 mins

## 2022-10-01 DIAGNOSIS — U07.1 COVID-19 VIRUS INFECTION: ICD-10-CM

## 2022-10-01 DIAGNOSIS — I16.0 HYPERTENSIVE URGENCY: Primary | ICD-10-CM

## 2022-10-01 DIAGNOSIS — I10 ESSENTIAL HYPERTENSION WITH GOAL BLOOD PRESSURE LESS THAN 140/90: ICD-10-CM

## 2022-10-01 DIAGNOSIS — S32.010A COMPRESSION FRACTURE OF L1 VERTEBRA, INITIAL ENCOUNTER (HCC): ICD-10-CM

## 2022-10-01 LAB
ANION GAP SERPL CALC-SCNC: 5 MMOL/L (ref 5–15)
BASOPHILS # BLD: 0 K/UL (ref 0–0.1)
BASOPHILS NFR BLD: 0 % (ref 0–1)
BUN SERPL-MCNC: 10 MG/DL (ref 6–20)
BUN/CREAT SERPL: 24 (ref 12–20)
CALCIUM SERPL-MCNC: 9.3 MG/DL (ref 8.5–10.1)
CHLORIDE SERPL-SCNC: 109 MMOL/L (ref 97–108)
CO2 SERPL-SCNC: 24 MMOL/L (ref 21–32)
CREAT SERPL-MCNC: 0.42 MG/DL (ref 0.55–1.02)
DIFFERENTIAL METHOD BLD: ABNORMAL
EOSINOPHIL # BLD: 0 K/UL (ref 0–0.4)
EOSINOPHIL NFR BLD: 0 % (ref 0–7)
ERYTHROCYTE [DISTWIDTH] IN BLOOD BY AUTOMATED COUNT: 14.2 % (ref 11.5–14.5)
GLUCOSE SERPL-MCNC: 114 MG/DL (ref 65–100)
HCT VFR BLD AUTO: 32.7 % (ref 35–47)
HGB BLD-MCNC: 10.9 G/DL (ref 11.5–16)
IMM GRANULOCYTES # BLD AUTO: 0.1 K/UL (ref 0–0.04)
IMM GRANULOCYTES NFR BLD AUTO: 2 % (ref 0–0.5)
LYMPHOCYTES # BLD: 1.3 K/UL (ref 0.8–3.5)
LYMPHOCYTES NFR BLD: 42 % (ref 12–49)
MCH RBC QN AUTO: 29.2 PG (ref 26–34)
MCHC RBC AUTO-ENTMCNC: 33.3 G/DL (ref 30–36.5)
MCV RBC AUTO: 87.7 FL (ref 80–99)
MONOCYTES # BLD: 0.2 K/UL (ref 0–1)
MONOCYTES NFR BLD: 7 % (ref 5–13)
NEUTS SEG # BLD: 1.5 K/UL (ref 1.8–8)
NEUTS SEG NFR BLD: 49 % (ref 32–75)
NRBC # BLD: 0 K/UL (ref 0–0.01)
NRBC BLD-RTO: 0 PER 100 WBC
PLATELET # BLD AUTO: 176 K/UL (ref 150–400)
PMV BLD AUTO: 8.8 FL (ref 8.9–12.9)
POTASSIUM SERPL-SCNC: 3.4 MMOL/L (ref 3.5–5.1)
RBC # BLD AUTO: 3.73 M/UL (ref 3.8–5.2)
SODIUM SERPL-SCNC: 138 MMOL/L (ref 136–145)
WBC # BLD AUTO: 3 K/UL (ref 3.6–11)

## 2022-10-01 PROCEDURE — 74011250637 HC RX REV CODE- 250/637: Performed by: STUDENT IN AN ORGANIZED HEALTH CARE EDUCATION/TRAINING PROGRAM

## 2022-10-01 PROCEDURE — 36415 COLL VENOUS BLD VENIPUNCTURE: CPT

## 2022-10-01 PROCEDURE — 97530 THERAPEUTIC ACTIVITIES: CPT | Performed by: OCCUPATIONAL THERAPIST

## 2022-10-01 PROCEDURE — 74011250636 HC RX REV CODE- 250/636: Performed by: FAMILY MEDICINE

## 2022-10-01 PROCEDURE — 99232 SBSQ HOSP IP/OBS MODERATE 35: CPT | Performed by: FAMILY MEDICINE

## 2022-10-01 PROCEDURE — 97535 SELF CARE MNGMENT TRAINING: CPT | Performed by: OCCUPATIONAL THERAPIST

## 2022-10-01 PROCEDURE — 85025 COMPLETE CBC W/AUTO DIFF WBC: CPT

## 2022-10-01 PROCEDURE — 74011250637 HC RX REV CODE- 250/637: Performed by: FAMILY MEDICINE

## 2022-10-01 PROCEDURE — 74011000250 HC RX REV CODE- 250

## 2022-10-01 PROCEDURE — 80048 BASIC METABOLIC PNL TOTAL CA: CPT

## 2022-10-01 PROCEDURE — 74011000250 HC RX REV CODE- 250: Performed by: STUDENT IN AN ORGANIZED HEALTH CARE EDUCATION/TRAINING PROGRAM

## 2022-10-01 PROCEDURE — 74011250636 HC RX REV CODE- 250/636: Performed by: STUDENT IN AN ORGANIZED HEALTH CARE EDUCATION/TRAINING PROGRAM

## 2022-10-01 PROCEDURE — 51798 US URINE CAPACITY MEASURE: CPT

## 2022-10-01 PROCEDURE — 97165 OT EVAL LOW COMPLEX 30 MIN: CPT | Performed by: OCCUPATIONAL THERAPIST

## 2022-10-01 PROCEDURE — 65270000046 HC RM TELEMETRY

## 2022-10-01 RX ORDER — POTASSIUM CHLORIDE 750 MG/1
20 TABLET, FILM COATED, EXTENDED RELEASE ORAL
Status: COMPLETED | OUTPATIENT
Start: 2022-10-01 | End: 2022-10-01

## 2022-10-01 RX ORDER — AMLODIPINE BESYLATE 5 MG/1
10 TABLET ORAL DAILY
Status: DISCONTINUED | OUTPATIENT
Start: 2022-10-01 | End: 2022-10-04 | Stop reason: HOSPADM

## 2022-10-01 RX ORDER — HYDRALAZINE HYDROCHLORIDE 20 MG/ML
10 INJECTION INTRAMUSCULAR; INTRAVENOUS ONCE
Status: COMPLETED | OUTPATIENT
Start: 2022-10-01 | End: 2022-10-01

## 2022-10-01 RX ORDER — OXYCODONE HYDROCHLORIDE 5 MG/1
5 TABLET ORAL
Status: DISCONTINUED | OUTPATIENT
Start: 2022-10-01 | End: 2022-10-04 | Stop reason: HOSPADM

## 2022-10-01 RX ORDER — HYDRALAZINE HYDROCHLORIDE 20 MG/ML
10 INJECTION INTRAMUSCULAR; INTRAVENOUS
Status: DISCONTINUED | OUTPATIENT
Start: 2022-10-01 | End: 2022-10-04 | Stop reason: HOSPADM

## 2022-10-01 RX ORDER — LIDOCAINE 4 G/100G
1 PATCH TOPICAL EVERY 24 HOURS
Status: DISCONTINUED | OUTPATIENT
Start: 2022-10-01 | End: 2022-10-04 | Stop reason: HOSPADM

## 2022-10-01 RX ORDER — POLYETHYLENE GLYCOL 3350 17 G/17G
17 POWDER, FOR SOLUTION ORAL DAILY
Status: DISCONTINUED | OUTPATIENT
Start: 2022-10-02 | End: 2022-10-02

## 2022-10-01 RX ORDER — AMOXICILLIN 250 MG
1 CAPSULE ORAL DAILY
Status: DISCONTINUED | OUTPATIENT
Start: 2022-10-02 | End: 2022-10-04 | Stop reason: HOSPADM

## 2022-10-01 RX ADMIN — Medication 10 ML: at 04:49

## 2022-10-01 RX ADMIN — POTASSIUM CHLORIDE 20 MEQ: 750 TABLET, FILM COATED, EXTENDED RELEASE ORAL at 08:14

## 2022-10-01 RX ADMIN — LABETALOL HYDROCHLORIDE 20 MG: 5 INJECTION, SOLUTION INTRAVENOUS at 21:04

## 2022-10-01 RX ADMIN — LOSARTAN POTASSIUM 100 MG: 50 TABLET, FILM COATED ORAL at 08:15

## 2022-10-01 RX ADMIN — Medication 10 ML: at 18:14

## 2022-10-01 RX ADMIN — ACETAMINOPHEN 1000 MG: 500 TABLET, FILM COATED ORAL at 21:30

## 2022-10-01 RX ADMIN — HYDRALAZINE HYDROCHLORIDE 10 MG: 20 INJECTION INTRAMUSCULAR; INTRAVENOUS at 04:48

## 2022-10-01 RX ADMIN — THERA TABS 1 TABLET: TAB at 08:16

## 2022-10-01 RX ADMIN — ACETAMINOPHEN 1000 MG: 500 TABLET, FILM COATED ORAL at 08:16

## 2022-10-01 RX ADMIN — AMLODIPINE BESYLATE 10 MG: 5 TABLET ORAL at 08:15

## 2022-10-01 RX ADMIN — Medication 10 ML: at 05:56

## 2022-10-01 RX ADMIN — Medication 10 ML: at 21:48

## 2022-10-01 RX ADMIN — LABETALOL HYDROCHLORIDE 20 MG: 5 INJECTION, SOLUTION INTRAVENOUS at 00:05

## 2022-10-01 NOTE — PROGRESS NOTES
10/1/2022  Case Management Note    2:32 PM  Per MD patient/family would like to pursue SNF. Choices are limited due to covid, referrals sent to potential accepting facilities. SERVANDO Lei    10:11 AM  [facility] accepts for [facility]. Placed on AVS.     SERVANDO Lei    9:08 AM  Noted consult for home health--referral sent to [facility]. Await response.      SERVANDO Lei

## 2022-10-01 NOTE — PROGRESS NOTES
Problem: Self Care Deficits Care Plan (Adult)  Goal: *Acute Goals and Plan of Care (Insert Text)  Description: FUNCTIONAL STATUS PRIOR TO ADMISSION: Patient was modified independent using a rolling walker for functional mobility. When she fell she forgot to take her walker. Independent basic ADL's and meal prep, hx of neuropathy bilateral feet    HOME SUPPORT: The patient lived with her daughter but did not require assist. Patient's daughter works and is away from home 4 days a week for 11 hours     Occupational Therapy Goals  Initiated 10/1/2022  1. Patient will perform grooming with modified independence standing at the sink within 7 day(s). 2.  Patient will perform lower body dressing with supervision/set-up and best technique within 7 day(s). 3.  Patient will tolerate > or = 10 minutes functional activity without significant decrease in blood pressure within 7 day(s). 4.  Patient will perform toilet transfers with supervision/set-up within 7 day(s). 5.  Patient will perform all aspects of toileting with supervision/set-up within 7 day(s). Outcome: Not Met     OCCUPATIONAL THERAPY EVALUATION  Patient: Joshua Johnson (89 y.o. female)  Date: 10/1/2022  Primary Diagnosis: Hypertensive urgency [I16.0]       Precautions:   Fall, Other (comment) (droplet)    ASSESSMENT  Based on the objective data described below, the patient presents with decreased activity tolerance and with significant decrease in blood pressure without symptoms after toileting in bathroom ~15 minutes. S/p POD 1 kyphoplasty. Patient very slow moving with all mobility and overall CGA. Decreased ability to reach distal LE's to perform LE dressing due to recommendation to avoid forward bending after kyphoplasty and with decreased blood pressure. Patient normally home alone ~11 hours, 4 days per week and feel she would benefit from rehab to increase activity tolerance, safety and independence.     Current Level of Function Impacting Discharge (ADLs/self-care): mod to max assist LE ADLs, min assist toileting, min to CGA transfers    Functional Outcome Measure: The patient scored 45/100 on the Barthel Index outcome measure which is indicative of partial dependence. Other factors to consider for discharge: mod I at baseline, home alone long time     Patient will benefit from skilled therapy intervention to address the above noted impairments. PLAN :  Recommendations and Planned Interventions: self care training, functional mobility training, therapeutic exercise, balance training, therapeutic activities, endurance activities, patient education, home safety training, and family training/education    Frequency/Duration: Patient will be followed by occupational therapy 5 times a week to address goals. Recommendation for discharge: (in order for the patient to meet his/her long term goals)  Therapy up to 5 days/week in SNF setting    This discharge recommendation:  Has been made in collaboration with the attending provider and/or case management    IF patient discharges home will need the following DME:        SUBJECTIVE:   Patient stated I would feel better if I went somewhere.     OBJECTIVE DATA SUMMARY:   HISTORY:   Past Medical History:   Diagnosis Date    Anemia     Sees Dr. 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    Osteochondritis 1975    Snoring      Past Surgical History:   Procedure Laterality Date    HX CATARACT REMOVAL  1997    HX DILATION AND CURETTAGE  1972    HX OTHER SURGICAL  1975    osteochroditis    [address]    blood patch for intercranial spontaneious hypotension     IR KYPHOPLASTY LUMBAR  9/30/2022       Expanded or extensive additional review of patient history:     Home Situation  Home Environment: Private residence  # Steps to Enter: 6  One/Two Story Residence: One story  Living Alone: No  Support Systems: Child(reynaldo)  Patient Expects to be Discharged to[de-identified] Home with family assistance  Current DME Used/Available at Home: Cane, quad, [place], straight, Marily Rubia, rollator    Hand dominance:     EXAMINATION OF PERFORMANCE DEFICITS:  Cognitive/Behavioral Status:  Neurologic State: Alert  Orientation Level: Oriented X4  Cognition: Appropriate decision making; Appropriate safety awareness; Follows commands  Perception: Appears intact  Perseveration: No perseveration noted  Safety/Judgement: Awareness of environment; Fall prevention;Good awareness of safety precautions; Home safety; Insight into deficits    Skin: noted 2 dressings on back from kyphoplasty    Edema: none noted    Hearing: Auditory  Auditory Impairment: None    Vision/Perceptual:                 Corrective Lenses: Glasses    Range of Motion:  AROM: Within functional limits                         Strength:  Strength: Generally decreased, functional                Coordination:  Coordination: Within functional limits  Fine Motor Skills-Upper: Left Intact; Right Intact    Gross Motor Skills-Upper: Left Intact; Right Intact    Tone & Sensation:  Tone: Normal  Sensation: Impaired (bilateral feet due to neuropathy)                      Balance:  Sitting: Intact  Standing: Intact; With support  Standing - Static: Constant support;Good  Standing - Dynamic : Fair;Constant support    Functional Mobility and Transfers for ADLs:  Bed Mobility:  Rolling:  (in chair)  Supine to Sit: Other (comment) (instructed on log roll)    Transfers:  Sit to Stand: Contact guard assistance; Adaptive equipment; Additional time;Assist x1  Stand to Sit: Contact guard assistance; Adaptive equipment; Additional time;Assist x1  Bed to Chair: Contact guard assistance; Adaptive equipment; Additional time;Assist x1  Bathroom Mobility: Contact guard assistance  Toilet Transfer : Contact guard assistance;Minimum assistance; Adaptive equipment; Additional time;Assist x1    ADL Assessment:  Feeding: Modified independent    Oral Facial Hygiene/Grooming: Stand-by assistance         Type of Bath: Chlorhexidine (CHG); Bath Pack    Upper Body Dressing: Supervision    Lower Body Dressing: Maximum assistance; Moderate assistance (normally bends to floor)    Toileting: Minimum assistance                  ADL Intervention and task modifications:     Patient instructed and indicated understanding the benefits of maintaining activity tolerance, functional mobility, and independence with self care tasks during acute stay  to ensure safe return home and to baseline. Encouraged patient to increase frequency and duration OOB, be out of bed for all meals, perform daily ADLs (as approved by RN/MD regarding bathing etc), and performing functional mobility to/from bathroom. Positioning: Educated on positioning in supine and sidelying to facilitate neutral alignment and increased comfort, provided visual demonstration to increase carryover    Educated on fall prevention and benefit of slow to rise, need to pay attention to symptoms and sit/elevate LE's if feeling like blood pressure dropping    Patient Vitals for the past 4 hrs:   Temp Pulse Resp BP SpO2   10/01/22 1000 -- 80 -- (!) 119/50 --   10/01/22 0956 -- 77 -- (!) 110/40 --   10/01/22 0930 -- 98 -- (!) 149/68 --   10/01/22 0730 97.9 °F (36.6 °C) 100 16 (!) 175/80 93 %              Cognitive Retraining  Safety/Judgement: Awareness of environment; Fall prevention;Good awareness of safety precautions; Home safety; Insight into deficits      Therapeutic Exercise: Instructed in ankle pumps and benefit   Functional Measure:    Barthel Index:  Bathin  Bladder: 0  Bowels: 10  Groomin  Dressin  Feeding: 10  Mobility: 0  Stairs: 0  Toilet Use: 5  Transfer (Bed to Chair and Back): 10  Total: 45/100      The Barthel ADL Index: Guidelines  1.  The index should be used as a record of what a patient does, not as a record of what a patient could do. 2. The main aim is to establish degree of independence from any help, physical or verbal, however minor and for whatever reason. 3. The need for supervision renders the patient not independent. 4. A patient's performance should be established using the best available evidence. Asking the patient, friends/relatives and nurses are the usual sources, but direct observation and common sense are also important. However direct testing is not needed. 5. Usually the patient's performance over the preceding 24-48 hours is important, but occasionally longer periods will be relevant. 6. Middle categories imply that the patient supplies over 50 per cent of the effort. 7. Use of aids to be independent is allowed. Score Interpretation (from [facility] 83)    Independent   60-79 Minimally independent   40-59 Partially dependent   20-39 Very dependent   <20 Totally dependent     -Lito Priest., Barthel, D.W. (1965). Functional evaluation: the Barthel Index. [facility] ([address] (1997). The Barthel activities of daily living index: self-reporting versus actual performance in the old (> or = 75 years). Journal of [address] [place] [id number]), [facility], JACQUES, Gisel Mauricio., Ashly Ghotra. (1999). Measuring the change in disability after inpatient rehabilitation; comparison of the responsiveness of the Barthel Index and Functional [place] Measure. Journal of Neurology, Neurosurgery, and Psychiatry, 66(4), 752-554. Robert Cazares, N.J.A, DUNIA [place], & Kristin Strickland, M.A. (2004) Assessment of post-stroke quality of life in cost-effectiveness studies: The usefulness of the Barthel Index and the EuroQoL-5D.  Quality of Life Research, 15, 410-24         Occupational Therapy Evaluation Charge Determination   History Examination Decision-Making   MEDIUM Complexity : Expanded review of history including physical, cognitive and psychosocial  history  LOW Complexity : 1-3 performance deficits relating to physical, cognitive , or psychosocial skils that result in activity limitations and / or participation restrictions  MEDIUM Complexity : Patient may present with comorbidities that affect occupational performnce. Miniml to moderate modification of tasks or assistance (eg, physical or verbal ) with assesment(s) is necessary to enable patient to complete evaluation       Based on the above components, the patient evaluation is determined to be of the following complexity level: LOW   Pain Rating:  No complaint    Activity Tolerance:   Poor, requires rest breaks, and signs and symptoms of orthostatic hypotension    After treatment patient left in no apparent distress:    Sitting in chair, Call bell within reach, Caregiver / family present, and LE's elevated in recliner    COMMUNICATION/EDUCATION:   The patients plan of care was discussed with: Registered nurse. Home safety education was provided and the patient/caregiver indicated understanding. and Patient/family have participated as able in goal setting and plan of care. This patients plan of care is appropriate for delegation to \[facility]\"".     Thank you for this referral.  Joseph Rabago, OTR/L  Time Calculation: 46 mins

## 2022-10-01 NOTE — PROGRESS NOTES
0700  Bedside and Verbal shift change report given to Montana Santana RN (oncoming nurse) by Pedro Wilson RN (offgoing nurse). Report included the following information SBAR, Kardex, Procedure Summary, Intake/Output, MAR, Recent Results, and Med Rec Status. 1900  Bedside and Verbal shift change report given to Pedro Wilson RN (oncoming nurse) by Montana Santana RN (offgoing nurse). Report included the following information SBAR, Kardex, Procedure Summary, Intake/Output, MAR, Recent Results, and Med Rec Status.

## 2022-10-01 NOTE — PROGRESS NOTES
2010  Notified Family Practice patient's BP was 193/103. She has prn Labetalol 20mg IV q6h prn, last given at 1526. Cannot receive another dose until 2126. Family Practice to come to bedside. New orders received. Patient given hydralazine. 2224  BP recheck = 182/70    2306  BP recheck = 192/69  Given labetalol    BP recheck = 159/78    0402  BP = 210/80  Notified Family Practice, new orders received.

## 2022-10-01 NOTE — PROGRESS NOTES
[address] [facility] Skye Quinones 33   Office [phone]  Fax [phone]     DAILY PROGRESS NOTE    24 Hour Events: BP elevated overnight to 193/103, 192/69, and 210/80. Given IV: hydral 10mg, labetalol 20mg, and hydral 10mg, respectively. Asymptomatic throughout. Did not require prn dilaudid overnight. SUBJECTIVE: Overall, patient reports no significant discomfort overnight and no associated symptoms. Denies significant back pain. Urinating and stooling with ease. Very minor chest congestion with no cough. Denies chest pain, SOB, HA, vision changes, or any other concerning symptoms. Patient reports this morning that her home BP is normally in 720'N systolic. OBJECTIVE:    Vitals: Visit Vitals  BP (!) 149/66 (BP 1 Location: Right upper arm, BP Patient Position: At rest)   Pulse 85   Temp 97.7 °F (36.5 °C)   Resp 14   Ht 5' 6\" (1.676 m)   Wt 152 lb (68.9 kg)   SpO2 95%   BMI 24.53 kg/m²     Physical Exam:  General: NAD. Respiratory: CTAB. Slightly diminished lung sounds @ base b/l  Cardiovascular: Regular rate. Systolic murmur  GI: Nondistended. + bowel sounds. No TTP  Extremities: No LE edema. Skin: Warm, dry.   Neuro: Alert and oriented x3    I/O:  Date 09/30/22 0700 - 10/01/22 0659 10/01/22 0700 - 10/02/22 0659   Shift 0700-1859 1900-0659 24 Hour Total 0700-1859 1900-0659 24 Hour Total   INTAKE   Shift Total(mL/kg)         OUTPUT   Urine(mL/kg/hr) 200(0.2)  200(0.1) 200  200     Urine Voided 200  200 200  200     Urine Occurrence(s)    0 x  0 x   Emesis/NG output 0  0 0  0     Emesis 0  0 0  0     Emesis Occurrence(s)    0 x  0 x   Stool 0  0 0  0     Stool Occurrence(s)    0 x  0 x     Stool 0  0 0  0   Shift Total(mL/kg) 200(2.9)  200(2.9) 200(2.9)  200(2.9)   NET -200  -200 -200  -200   Weight (kg) 68.9 68.9 68.9 68.9 68.9 68.9       Inpatient Medications  Current Facility-Administered Medications   Medication Dose Route Frequency    amLODIPine (NORVASC) tablet 10 mg  10 mg Oral DAILY therapeutic multivitamin (THERAGRAN) tablet 1 Tablet  1 Tablet Oral DAILY    labetaloL (NORMODYNE;TRANDATE) 20 mg/4 mL (5 mg/mL) injection 20 mg  20 mg IntraVENous Q6H PRN    acetaminophen (TYLENOL) tablet 1,000 mg  1,000 mg Oral TID    HYDROmorphone (DILAUDID) syringe 0.2 mg  0.2 mg IntraVENous Q4H PRN    losartan (COZAAR) tablet 100 mg  100 mg Oral DAILY    sodium chloride (NS) flush 5-40 mL  5-40 mL IntraVENous Q8H    sodium chloride (NS) flush 5-40 mL  5-40 mL IntraVENous PRN    acetaminophen (TYLENOL) tablet 650 mg  650 mg Oral Q6H PRN    Or    acetaminophen (TYLENOL) suppository 650 mg  650 mg Rectal Q6H PRN    lidocaine 4 % patch 1 Patch  1 Patch TransDERmal Q24H       Allergies  Allergies   Allergen Reactions    Hyzaar [Losartan-Hydrochlorothiazide] Vertigo    Pseudoephedrine Other (comments)     Rapid heart rate. Naprosyn [Naproxen] Not Reported This Time       CBC:  Recent Labs     10/01/22  0533 09/30/22  0347 09/29/22  0231   WBC 3.0* 2.8* 2.7*   HGB 10.9* 10.8* 11.3*   HCT 32.7* 32.6* 34.2*    174 817       Metabolic Panel:  Recent Labs     10/01/22  0533 09/30/22  0347 09/29/22  0231    138 139   K 3.4* 3.8 3.5   * 107 107   CO2 24 26 27   BUN 10 9 6   CREA 0.42* 0.46* 0.53*   * 116* 105*   CA 9.3 8.8 8.7            Assessment and Plan     Rafa Zavaleta is a 80 y.o. female with a PMH of HTN and recent covid infection who was admitted for Hypertensive Urgency and L1 compression Fx now s/p Kyphoplasty. Poorly controlled BP overnight but overall stable and awaiting SNF placement. Hypertensive Urgency in the setting of chronic HTN: Completely asymptomatic. POA /109. Has been uncontrolled lately as OP. -215. CTA chest w/o AD. EKG w/o MI.  Normal Cr.  - Home Losartan 100 mg PO daily   - Started on Amlodipine 5 mg daily --> increased to 10mg today  - Labetalol 20 mg prn for SBP >180 or DBP > 110  - Will continue to monitor at this time and readjust as BP's trend.   - Will need close BP monitoring after discharge. May consider spironolactone with K+ trending low if pt continues to be poorly controlled    Back pain 2/2 L1 Compression Fracture: GLD on 9/23. S/P L1 Kyphoplasty on 9/30 with IR  MRI: Compression deformity without significant canal compromise L1. 2. Edema in the superior endplate of L4. 3. Severe spinal canal stenosis L3 L5.   - Morphine 2 mg q4hrs as needed  - Tylenol 650 mng prn   - Ortho was consulted prior to Kyphoplasty and may follow up today    Covid infection: Asx. Positive on 9/20, 9/29  - Droplet precautions. FEN/GI: Regular diet  Activity: Bed rest  DVT prophylaxis: SCDs  GI prophylaxis:  Not indicated   Disposition: Plan to d/c to SNF. OT recs 5x/wk.  Patient and daughter in agreement  POC: [place]ena Villegas - daughter [phone]     CODE STATUS:  Full Code     Kristin Cox DO  Family Medicine Resident       For Billing    Chief Complaint   Patient presents with    Back Pain    Hypertension       Hospital Problems  Date Reviewed: 8/25/2022            Codes Class Noted POA    * (Principal) Hypertensive urgency ICD-10-CM: I16.0  ICD-9-CM: 401.9  9/28/2022 Unknown        Compression fracture of L1 vertebra ([facility] [address] ICD-10-CM: S32.010A  ICD-9-CM: 805.4  9/28/2022 Unknown        COVID-19 virus infection ICD-10-CM: U07.1  ICD-9-CM: 079.89  9/20/2022 Unknown

## 2022-10-01 NOTE — PROGRESS NOTES
Problem: Risk for Spread of Infection  Goal: Prevent transmission of infectious organism to others  Description: Prevent the transmission of infectious organisms to other patients, staff members, and visitors.   Outcome: Progressing Towards Goal     Problem: Patient Education:  Go to Education Activity  Goal: Patient/Family Education  Outcome: Progressing Towards Goal     Problem: Hypertension  Goal: *Blood pressure within specified parameters  Outcome: Progressing Towards Goal  Goal: *Fluid volume balance  Outcome: Progressing Towards Goal  Goal: *Labs within defined limits  Outcome: Progressing Towards Goal     Problem: Patient Education: Go to Patient Education Activity  Goal: Patient/Family Education  Outcome: Progressing Towards Goal

## 2022-10-01 NOTE — PROGRESS NOTES
Ultrasound IV by Yonatan Machado RN :  Procedure Note    Patient meets criteria for US IV insertion. Ultrasound IV education provided to patient. Opportunities for questions given. Ultrasound used for PIV placement:  20gauge 1.25in BD Nexiva  R forearm location. 1 X Attempt(s). Flushed with ease; vigorous blood return. Procedure tolerated well. Primary RN aware of IV placement and added to LDA.       Yonatan Machado RN

## 2022-10-02 PROBLEM — U07.1 COVID-19: Status: ACTIVE | Noted: 2022-10-02

## 2022-10-02 PROBLEM — S32.010A CLOSED COMPRESSION FRACTURE OF BODY OF L1 VERTEBRA (HCC): Status: ACTIVE | Noted: 2022-10-02

## 2022-10-02 LAB
ANION GAP SERPL CALC-SCNC: 5 MMOL/L (ref 5–15)
BUN SERPL-MCNC: 7 MG/DL (ref 6–20)
BUN/CREAT SERPL: 15 (ref 12–20)
CALCIUM SERPL-MCNC: 9.7 MG/DL (ref 8.5–10.1)
CHLORIDE SERPL-SCNC: 108 MMOL/L (ref 97–108)
CO2 SERPL-SCNC: 26 MMOL/L (ref 21–32)
CREAT SERPL-MCNC: 0.48 MG/DL (ref 0.55–1.02)
ERYTHROCYTE [DISTWIDTH] IN BLOOD BY AUTOMATED COUNT: 14.4 % (ref 11.5–14.5)
GLUCOSE SERPL-MCNC: 129 MG/DL (ref 65–100)
HCT VFR BLD AUTO: 33.9 % (ref 35–47)
HGB BLD-MCNC: 11.1 G/DL (ref 11.5–16)
MCH RBC QN AUTO: 29.1 PG (ref 26–34)
MCHC RBC AUTO-ENTMCNC: 32.7 G/DL (ref 30–36.5)
MCV RBC AUTO: 89 FL (ref 80–99)
NRBC # BLD: 0 K/UL (ref 0–0.01)
NRBC BLD-RTO: 0 PER 100 WBC
PLATELET # BLD AUTO: 196 K/UL (ref 150–400)
PMV BLD AUTO: 9.1 FL (ref 8.9–12.9)
POTASSIUM SERPL-SCNC: 4.1 MMOL/L (ref 3.5–5.1)
RBC # BLD AUTO: 3.81 M/UL (ref 3.8–5.2)
SODIUM SERPL-SCNC: 139 MMOL/L (ref 136–145)
WBC # BLD AUTO: 3.8 K/UL (ref 3.6–11)

## 2022-10-02 PROCEDURE — 65270000046 HC RM TELEMETRY

## 2022-10-02 PROCEDURE — 74011250637 HC RX REV CODE- 250/637: Performed by: STUDENT IN AN ORGANIZED HEALTH CARE EDUCATION/TRAINING PROGRAM

## 2022-10-02 PROCEDURE — 74011250636 HC RX REV CODE- 250/636: Performed by: STUDENT IN AN ORGANIZED HEALTH CARE EDUCATION/TRAINING PROGRAM

## 2022-10-02 PROCEDURE — 74011000250 HC RX REV CODE- 250

## 2022-10-02 PROCEDURE — 99232 SBSQ HOSP IP/OBS MODERATE 35: CPT | Performed by: FAMILY MEDICINE

## 2022-10-02 PROCEDURE — 74011000250 HC RX REV CODE- 250: Performed by: STUDENT IN AN ORGANIZED HEALTH CARE EDUCATION/TRAINING PROGRAM

## 2022-10-02 PROCEDURE — 85027 COMPLETE CBC AUTOMATED: CPT

## 2022-10-02 PROCEDURE — 74011250637 HC RX REV CODE- 250/637: Performed by: FAMILY MEDICINE

## 2022-10-02 PROCEDURE — 80048 BASIC METABOLIC PNL TOTAL CA: CPT

## 2022-10-02 PROCEDURE — 36415 COLL VENOUS BLD VENIPUNCTURE: CPT

## 2022-10-02 RX ORDER — SPIRONOLACTONE 25 MG/1
12.5 TABLET ORAL DAILY
Status: DISCONTINUED | OUTPATIENT
Start: 2022-10-02 | End: 2022-10-04 | Stop reason: HOSPADM

## 2022-10-02 RX ORDER — POLYETHYLENE GLYCOL 3350 17 G/17G
17 POWDER, FOR SOLUTION ORAL DAILY
Status: DISCONTINUED | OUTPATIENT
Start: 2022-10-02 | End: 2022-10-04 | Stop reason: HOSPADM

## 2022-10-02 RX ADMIN — Medication 10 ML: at 16:46

## 2022-10-02 RX ADMIN — OXYCODONE 5 MG: 5 TABLET ORAL at 20:43

## 2022-10-02 RX ADMIN — AMLODIPINE BESYLATE 10 MG: 5 TABLET ORAL at 08:55

## 2022-10-02 RX ADMIN — ACETAMINOPHEN 1000 MG: 500 TABLET, FILM COATED ORAL at 22:06

## 2022-10-02 RX ADMIN — ACETAMINOPHEN 1000 MG: 500 TABLET, FILM COATED ORAL at 08:53

## 2022-10-02 RX ADMIN — LOSARTAN POTASSIUM 100 MG: 50 TABLET, FILM COATED ORAL at 08:54

## 2022-10-02 RX ADMIN — Medication 10 ML: at 22:06

## 2022-10-02 RX ADMIN — THERA TABS 1 TABLET: TAB at 08:53

## 2022-10-02 RX ADMIN — ACETAMINOPHEN 1000 MG: 500 TABLET, FILM COATED ORAL at 16:45

## 2022-10-02 RX ADMIN — OXYCODONE 5 MG: 5 TABLET ORAL at 08:54

## 2022-10-02 RX ADMIN — HYDRALAZINE HYDROCHLORIDE 10 MG: 20 INJECTION INTRAMUSCULAR; INTRAVENOUS at 01:09

## 2022-10-02 RX ADMIN — POLYETHYLENE GLYCOL 3350 17 G: 17 POWDER, FOR SOLUTION ORAL at 08:57

## 2022-10-02 RX ADMIN — SENNOSIDES AND DOCUSATE SODIUM 1 TABLET: 50; 8.6 TABLET ORAL at 09:00

## 2022-10-02 RX ADMIN — SPIRONOLACTONE 12.5 MG: 25 TABLET ORAL at 09:27

## 2022-10-02 NOTE — PROGRESS NOTES
[address] [facility] Skye Quinones 33   Office [phone]  Fax [phone]          Subjective / Objective     24 Hour Events: elevated BP overnight with highest recorded 208/83 at 6:30 PM 10/1. Last /74 at 7:40 AM. Potassium 3.4 s/p 20meq Kcl. Subjective: Patient resting comfortably in bed. C/o L upper arm pain likely 2/2 IV infiltration in left basilic. Denies chest pain, abdo pain, headache, dizziness, SOB, covid symps. Awaiting SNF placement. Temp (24hrs), Av.7 °F (36.5 °C), Min:97 °F (36.1 °C), Max:98.1 °F (36.7 °C)     Physical Exam  Constitutional:       Appearance: Normal appearance. HENT:      Head: Normocephalic and atraumatic. Right Ear: External ear normal.      Left Ear: External ear normal.      Nose: Nose normal. No congestion or rhinorrhea. Eyes:      Extraocular Movements: Extraocular movements intact. Conjunctiva/sclera: Conjunctivae normal.   Cardiovascular:      Rate and Rhythm: Normal rate and regular rhythm. Pulses: Normal pulses. Pulmonary:      Effort: Pulmonary effort is normal.      Breath sounds: Normal breath sounds. Abdominal:      General: Abdomen is flat. Palpations: Abdomen is soft. Tenderness: There is no abdominal tenderness. Musculoskeletal:      Cervical back: Normal range of motion. Skin:     General: Skin is warm and dry. Comments: L arm bruising from IV infiltration    Neurological:      General: No focal deficit present. Mental Status: She is alert.       Respiratory:   O2 Device: None (Room air)     I/O:  Date 10/01/22 0700 - 10/02/22 0659 10/02/22 0700 - 10/03/22 0659   Shift 5109-4323 5368-8040 24 Hour Total 3417-1146 3630-5182 24 Hour Total   INTAKE   Shift Total(mL/kg)         OUTPUT   Urine(mL/kg/hr) 200(0.2) 950(1) 1150(0.6)        Urine Voided         Urine Occurrence(s) 0 x  0 x      Emesis/NG output 0  0        Emesis 0  0        Emesis Occurrence(s) 0 x  0 x      Stool 0 0        Stool Occurrence(s) 0 x  0 x        Stool 0  0      Shift Total(mL/kg) 200(2.9) 950(12.6) 1150(15.2)      NET -200 -950 -1150      Weight (kg) 68.9 75.4 75.4 75.4 75.4 75.4       Inpatient Medications  Current Facility-Administered Medications   Medication Dose Route Frequency    polyethylene glycol (MIRALAX) packet 17 g  17 g Oral DAILY    spironolactone (ALDACTONE) tablet 12.5 mg  12.5 mg Oral DAILY    amLODIPine (NORVASC) tablet 10 mg  10 mg Oral DAILY    hydrALAZINE (APRESOLINE) 20 mg/mL injection 10 mg  10 mg IntraVENous Q6H PRN    oxyCODONE IR (ROXICODONE) tablet 5 mg  5 mg Oral Q4H PRN    senna-docusate (PERICOLACE) 8.6-50 mg per tablet 1 Tablet  1 Tablet Oral DAILY    lidocaine 4 % patch 1 Patch  1 Patch TransDERmal Q24H    therapeutic multivitamin (THERAGRAN) tablet 1 Tablet  1 Tablet Oral DAILY    labetaloL (NORMODYNE;TRANDATE) 20 mg/4 mL (5 mg/mL) injection 20 mg  20 mg IntraVENous Q6H PRN    acetaminophen (TYLENOL) tablet 1,000 mg  1,000 mg Oral TID    HYDROmorphone (DILAUDID) syringe 0.2 mg  0.2 mg IntraVENous Q4H PRN    losartan (COZAAR) tablet 100 mg  100 mg Oral DAILY    sodium chloride (NS) flush 5-40 mL  5-40 mL IntraVENous Q8H    sodium chloride (NS) flush 5-40 mL  5-40 mL IntraVENous PRN    acetaminophen (TYLENOL) tablet 650 mg  650 mg Oral Q6H PRN    Or    acetaminophen (TYLENOL) suppository 650 mg  650 mg Rectal Q6H PRN         Allergies  Allergies   Allergen Reactions    Hyzaar [Losartan-Hydrochlorothiazide] Vertigo    Pseudoephedrine Other (comments)     Rapid heart rate. Naprosyn [Naproxen] Not Reported This Time         CBC:  Recent Labs     10/01/22  0533 09/30/22  0347   WBC 3.0* 2.8*   HGB 10.9* 10.8*   HCT 32.7* 32.6*    445       Metabolic Panel:  Recent Labs     10/01/22  0533 09/30/22  0347    138   K 3.4* 3.8   * 107   CO2 24 26   BUN 10 9   CREA 0.42* 0.46*   * 116*   CA 9.3 8.8            Assessment and Plan     Rafa Zavaleta is a 80 y.o. female with a pmhx of HTN and recent covid infection. She was admitted for hypertensive urgency and L1 compression fracture. Hypertensive urgency in s/o existing HTN POA /109. Has been uncontrolled outpt. -215. CTA chest neg. EKG NSR. Cr wnl. ALT 94 > 43.  > 40.  - continue home Losartan 100 mg PO daily   - Amlodipine 10mg  - Spironolactone 12.5mg   - Labetalol 20mg prn for SBP >180 or DBP > 110  - Continue to monitor      Compression Fracture GLD on 9/23 s/p L1 Kyphoplasty on 9/30 by IR. MRI: Compression deformity without significant canal compromise L1. Edema in the superior endplate of L4. Severe spinal canal stenosis L3 L5.   - Morphine 2mg q4h prn  - Tylenol 650mg prn   - ortho on consult appreciate recs    Covid Positive on 9/20, 9/29. Asymptomatic.   - Droplet precautions. FEN/GI - Regular diet. Activity -  Bed rest  DVT prophylaxis - SCDs  GI prophylaxis - Not indicated at this time  Disposition - Plan to d/c to [facility].   Code Status - Full    I appreciate the opportunity to participate in the care of this patient,  Azam Foster MD  [facility] Medicine Resident       For Billing    Chief Complaint   Patient presents with    Back Pain    Hypertension       Hospital Problems  Date Reviewed: 8/25/2022            Codes Class Noted POA    * (Principal) Hypertensive urgency ICD-10-CM: I16.0  ICD-9-CM: 401.9  9/28/2022 Unknown        Compression fracture of L1 vertebra ([facility] [address] ICD-10-CM: S32.010A  ICD-9-CM: 805.4  9/28/2022 Unknown        COVID-19 virus infection ICD-10-CM: U07.1  ICD-9-CM: 079.89  9/20/2022 Unknown

## 2022-10-02 NOTE — PROGRESS NOTES
2145  Patient refused lidocaine patch at 1816, requested for it to be applied at bedtime. Called Family Practice to have due time changed. Applied at 2147.

## 2022-10-03 LAB
ANION GAP SERPL CALC-SCNC: 4 MMOL/L (ref 5–15)
APPEARANCE UR: CLEAR
BACTERIA URNS QL MICRO: NEGATIVE /HPF
BILIRUB UR QL: NEGATIVE
BUN SERPL-MCNC: 10 MG/DL (ref 6–20)
BUN/CREAT SERPL: 21 (ref 12–20)
CALCIUM SERPL-MCNC: 9.1 MG/DL (ref 8.5–10.1)
CHLORIDE SERPL-SCNC: 110 MMOL/L (ref 97–108)
CO2 SERPL-SCNC: 25 MMOL/L (ref 21–32)
COLOR UR: NORMAL
COVID-19 RAPID TEST, COVR: DETECTED
CREAT SERPL-MCNC: 0.48 MG/DL (ref 0.55–1.02)
EPITH CASTS URNS QL MICRO: NORMAL /LPF
ERYTHROCYTE [DISTWIDTH] IN BLOOD BY AUTOMATED COUNT: 14.1 % (ref 11.5–14.5)
GLUCOSE SERPL-MCNC: 124 MG/DL (ref 65–100)
GLUCOSE UR STRIP.AUTO-MCNC: NEGATIVE MG/DL
HCT VFR BLD AUTO: 29.7 % (ref 35–47)
HGB BLD-MCNC: 10 G/DL (ref 11.5–16)
HGB UR QL STRIP: NEGATIVE
HYALINE CASTS URNS QL MICRO: NORMAL /LPF (ref 0–2)
KETONES UR QL STRIP.AUTO: NEGATIVE MG/DL
LEUKOCYTE ESTERASE UR QL STRIP.AUTO: NEGATIVE
MCH RBC QN AUTO: 29.1 PG (ref 26–34)
MCHC RBC AUTO-ENTMCNC: 33.7 G/DL (ref 30–36.5)
MCV RBC AUTO: 86.3 FL (ref 80–99)
NITRITE UR QL STRIP.AUTO: NEGATIVE
NRBC # BLD: 0 K/UL (ref 0–0.01)
NRBC BLD-RTO: 0 PER 100 WBC
PH UR STRIP: 5.5 [PH] (ref 5–8)
PLATELET # BLD AUTO: 191 K/UL (ref 150–400)
PMV BLD AUTO: 9 FL (ref 8.9–12.9)
POTASSIUM SERPL-SCNC: 3.8 MMOL/L (ref 3.5–5.1)
PROT UR STRIP-MCNC: NEGATIVE MG/DL
RBC # BLD AUTO: 3.44 M/UL (ref 3.8–5.2)
RBC #/AREA URNS HPF: NORMAL /HPF (ref 0–5)
SODIUM SERPL-SCNC: 139 MMOL/L (ref 136–145)
SOURCE, COVRS: ABNORMAL
SP GR UR REFRACTOMETRY: 1.01 (ref 1–1.03)
UA: UC IF INDICATED,UAUC: NORMAL
UROBILINOGEN UR QL STRIP.AUTO: 0.2 EU/DL (ref 0.2–1)
WBC # BLD AUTO: 3.6 K/UL (ref 3.6–11)
WBC URNS QL MICRO: NORMAL /HPF (ref 0–4)

## 2022-10-03 PROCEDURE — 74011250636 HC RX REV CODE- 250/636: Performed by: STUDENT IN AN ORGANIZED HEALTH CARE EDUCATION/TRAINING PROGRAM

## 2022-10-03 PROCEDURE — 85027 COMPLETE CBC AUTOMATED: CPT

## 2022-10-03 PROCEDURE — 74011250637 HC RX REV CODE- 250/637: Performed by: STUDENT IN AN ORGANIZED HEALTH CARE EDUCATION/TRAINING PROGRAM

## 2022-10-03 PROCEDURE — 65270000046 HC RM TELEMETRY

## 2022-10-03 PROCEDURE — 97116 GAIT TRAINING THERAPY: CPT

## 2022-10-03 PROCEDURE — 36415 COLL VENOUS BLD VENIPUNCTURE: CPT

## 2022-10-03 PROCEDURE — 74011000250 HC RX REV CODE- 250: Performed by: STUDENT IN AN ORGANIZED HEALTH CARE EDUCATION/TRAINING PROGRAM

## 2022-10-03 PROCEDURE — 97110 THERAPEUTIC EXERCISES: CPT

## 2022-10-03 PROCEDURE — 87635 SARS-COV-2 COVID-19 AMP PRB: CPT

## 2022-10-03 PROCEDURE — 77030038269 HC DRN EXT URIN PURWCK BARD -A

## 2022-10-03 PROCEDURE — 97535 SELF CARE MNGMENT TRAINING: CPT

## 2022-10-03 PROCEDURE — 80048 BASIC METABOLIC PNL TOTAL CA: CPT

## 2022-10-03 PROCEDURE — 74011000250 HC RX REV CODE- 250

## 2022-10-03 PROCEDURE — 81001 URINALYSIS AUTO W/SCOPE: CPT

## 2022-10-03 PROCEDURE — 99232 SBSQ HOSP IP/OBS MODERATE 35: CPT | Performed by: FAMILY MEDICINE

## 2022-10-03 PROCEDURE — 74011250637 HC RX REV CODE- 250/637: Performed by: FAMILY MEDICINE

## 2022-10-03 RX ADMIN — LOSARTAN POTASSIUM 100 MG: 50 TABLET, FILM COATED ORAL at 08:00

## 2022-10-03 RX ADMIN — AMLODIPINE BESYLATE 10 MG: 5 TABLET ORAL at 08:00

## 2022-10-03 RX ADMIN — ACETAMINOPHEN 650 MG: 325 TABLET ORAL at 06:16

## 2022-10-03 RX ADMIN — LABETALOL HYDROCHLORIDE 20 MG: 5 INJECTION, SOLUTION INTRAVENOUS at 00:07

## 2022-10-03 RX ADMIN — SPIRONOLACTONE 12.5 MG: 25 TABLET ORAL at 08:00

## 2022-10-03 RX ADMIN — SENNOSIDES AND DOCUSATE SODIUM 1 TABLET: 50; 8.6 TABLET ORAL at 08:00

## 2022-10-03 RX ADMIN — Medication 10 ML: at 23:02

## 2022-10-03 RX ADMIN — HYDRALAZINE HYDROCHLORIDE 10 MG: 20 INJECTION INTRAMUSCULAR; INTRAVENOUS at 17:54

## 2022-10-03 RX ADMIN — ACETAMINOPHEN 1000 MG: 500 TABLET, FILM COATED ORAL at 16:38

## 2022-10-03 RX ADMIN — Medication 10 ML: at 14:00

## 2022-10-03 RX ADMIN — Medication 10 ML: at 06:08

## 2022-10-03 RX ADMIN — OXYCODONE 5 MG: 5 TABLET ORAL at 23:01

## 2022-10-03 RX ADMIN — THERA TABS 1 TABLET: TAB at 08:00

## 2022-10-03 RX ADMIN — LABETALOL HYDROCHLORIDE 20 MG: 5 INJECTION, SOLUTION INTRAVENOUS at 16:38

## 2022-10-03 RX ADMIN — LABETALOL HYDROCHLORIDE 20 MG: 5 INJECTION, SOLUTION INTRAVENOUS at 23:20

## 2022-10-03 RX ADMIN — ACETAMINOPHEN 1000 MG: 500 TABLET, FILM COATED ORAL at 08:17

## 2022-10-03 RX ADMIN — ACETAMINOPHEN 1000 MG: 500 TABLET, FILM COATED ORAL at 23:01

## 2022-10-03 NOTE — PROGRESS NOTES
0720: SBAR received from [address]: TRANSFER - OUT REPORT:  Verbal report given to Mya Ervin (name) on Sydni Neighbours  being transferred to 5th Floor ([phone]) (unit) for routine progression of care     Report consisted of patients Situation, Background, Assessment and   Recommendations(SBAR). Information from the following report(s) SBAR, Kardex, Intake/Output, MAR, and Recent Results was reviewed with the receiving nurse. Lines:   Peripheral IV 10/01/22 Anterior;Right Forearm (Active)   Site Assessment Clean, dry, & intact 10/03/22 0810   Phlebitis Assessment 0 10/03/22 0810   Infiltration Assessment 0 10/03/22 0810   Dressing Status Clean, dry, & intact 10/03/22 0810   Dressing Type Transparent 10/03/22 0810   Hub Color/Line Status End cap changed 10/03/22 0810   Action Taken Open ports on tubing capped 10/03/22 0810   Alcohol Cap Used Yes 10/03/22 0810   Opportunity for questions and clarification was provided. Patient transported with RN.

## 2022-10-03 NOTE — PROGRESS NOTES
0700  Bedside and Verbal shift change report given to Candelaria Rojas RN (oncoming nurse) by Rena Astorga RN (offgoing nurse). Report included the following information SBAR, Kardex, Procedure Summary, Intake/Output, MAR, Recent Results, and Med Rec Status. 1900  Bedside and Verbal shift change report given to Gwen Alvarez RN (oncoming nurse) by Candelaria Rojas RN (offgoing nurse). Report included the following information SBAR, Kardex, Procedure Summary, Intake/Output, MAR, Recent Results, and Med Rec Status.

## 2022-10-03 NOTE — PROGRESS NOTES
Problem: Self Care Deficits Care Plan (Adult)  Goal: *Acute Goals and Plan of Care (Insert Text)  Description: FUNCTIONAL STATUS PRIOR TO ADMISSION: Patient was modified independent using a rolling walker for functional mobility. When she fell she forgot to take her walker. Independent basic ADL's and meal prep, hx of neuropathy bilateral feet    HOME SUPPORT: The patient lived with her daughter but did not require assist. Patient's daughter works and is away from home 4 days a week for 11 hours     Occupational Therapy Goals  Initiated 10/1/2022  1. Patient will perform grooming with modified independence standing at the sink within 7 day(s). 2.  Patient will perform lower body dressing with supervision/set-up and best technique within 7 day(s). 3.  Patient will tolerate > or = 10 minutes functional activity without significant decrease in blood pressure within 7 day(s). 4.  Patient will perform toilet transfers with supervision/set-up within 7 day(s). 5.  Patient will perform all aspects of toileting with supervision/set-up within 7 day(s). Outcome: Progressing Towards Goal     OCCUPATIONAL THERAPY TREATMENT  Patient: Fredi Russ (10 y.o. female)  Date: 10/3/2022  Diagnosis: Hypertensive urgency [I16.0] Hypertensive urgency      Precautions: Fall, Other (comment) (droplet)  Chart, occupational therapy assessment, plan of care, and goals were reviewed. ASSESSMENT  Patient continues with skilled OT services and is progressing towards goals. Ms. Hoda Bass continues to present with decreased activity tolerance, decreased strength, and impaired dynamic standing balance. She is received OOB in chair on RA with supportive daughter present. She requires cues for spinal precautions for pain control during functional activity but demonstrates good participation and efforts with ADLs, including simulated toileting, standing grooming, and distal LB dressing.  Pt able to demonstrate crossed leg technique for sock mgmt and continues to benefit from use of RW during mobility. Noted HR ranged from 90 bpm at rest to 113 with activity and pt with brief desaturation to 89% during ADLs in bathroom. Handouts for therapeutic exercises issued at end of session. Continue to recommend SNF at discharge to maximize safety and independence as daughter works 11 hour days and pt will be alone at that time. Current Level of Function Impacting Discharge (ADLs): overall SBA to min A for ADLs and ADL transfers    Other factors to consider for discharge: supportive daughter; good efforts; s/p Kyphoplasty         PLAN :  Patient continues to benefit from skilled intervention to address the above impairments. Continue treatment per established plan of care to address goals. Recommend with staff: OOB to chair for meals; mobility to bathroom for toileting; ADLs as needed    Recommend next OT session: serial standing tasks; dynamic balance; UE HEP    Recommendation for discharge: (in order for the patient to meet his/her long term goals)  Therapy up to 5 days/week in SNF setting    This discharge recommendation:  Has been made in collaboration with the attending provider and/or case management    IF patient discharges home will need the following DME: TBD       SUBJECTIVE:   Patient stated I feel pretty good today.     OBJECTIVE DATA SUMMARY:   Cognitive/Behavioral Status:  Neurologic State: Alert  Orientation Level: Oriented X4  Cognition: Appropriate decision making; Appropriate for age attention/concentration; Appropriate safety awareness; Follows commands  Perception: Appears intact  Perseveration: No perseveration noted  Safety/Judgement: Awareness of environment; Fall prevention;Good awareness of safety precautions; Home safety; Insight into deficits    Functional Mobility and Transfers for ADLs:  Bed Mobility:  Supine to Sit:  (pt received sitting OOB in chair)  Scooting: Contact guard assistance    Transfers:  Sit to Stand: Contact guard assistance  Functional Transfers  Bathroom Mobility: Contact guard assistance  Toilet Transfer : Contact guard assistance  Cues: Verbal cues provided  Adaptive Equipment: Grab bars; Walker (comment)  Bed to Chair: Contact guard assistance    Balance:  Sitting: Intact  Standing: With support  Standing - Static: Good;Constant support  Standing - Dynamic : Fair;Constant support    ADL Intervention:  Grooming  Grooming Assistance: Contact guard assistance  Position Performed: Standing (at sink with RW)  Washing Hands: Contact guard assistance    Lower Body Dressing Assistance  Socks: Set-up; Supervision  Leg Crossed Method Used: Yes  Position Performed: Seated in chair    Toileting  Clothing Management: Minimum assistance (simulated in bathroom)  Cues: Verbal cues provided  Adaptive Equipment: Grab bars; Walker    Cognitive Retraining  Safety/Judgement: Awareness of environment; Fall prevention;Good awareness of safety precautions; Home safety; Insight into deficits    Instructed patient to increase time sitting OOB in chair for pulmonary hygiene, skin integrity, and to increase endurance in preparation for ADLs. Instructed patient to sit OOB for all meals. Patient verbalized understanding of same. Therapeutic Exercises:   Pt instructed on UE and LE exercises at end of session for UE strengthening in prep for ADLs and ADL transfers. Pt issued handouts with pictures to increase carry-over and follow through. Instructed pt to perform exercises 2-3x/day with at least 5-10 reps of each. Pain:  Pt reporting minimal back pain this session    Activity Tolerance:   Fair    After treatment patient left in no apparent distress:   Sitting in chair, Call bell within reach, and Caregiver / family present    COMMUNICATION/COLLABORATION:   The patients plan of care was discussed with: Physical therapist and Registered nurse.      [place] JENNIFER Singh  Time Calculation: 23 mins

## 2022-10-03 NOTE — PROGRESS NOTES
CM follow up:    Patient accepted by [facility]. Spoke with patient's daughter Yazan Alejandra via phone, she is agreeable to plan for SNF at [facility]. Spoke with Jose Estrada at [facility], COVID+ status verified, she is reviewing with coordinator for acceptance. Will call back re: status.     Daryl Emmanuel, RN, MSN/Care manager  [phone]

## 2022-10-03 NOTE — PROGRESS NOTES
Bedside and Verbal shift change report given to [address] [place] [address] Road (oncoming nurse) by Mely Christianson (offgoing nurse). Report included the following information SBAR, Kardex, Intake/Output, MAR, Recent Results, Med Rec Status, and Cardiac Rhythm NSR .

## 2022-10-03 NOTE — PROGRESS NOTES
[address] [facility] [address]   Office [phone]  Fax [phone]          Subjective / Objective     24 Hour Events: elevated BP overnight with highest recorded 180/74 - labetalol 20 given. Pressures since normal.     Subjective: Patient resting comfortably in bed. C/o L upper arm pain likely 2/2 IV infiltration in left basilic. Denies chest pain, abdo pain, headache, dizziness, SOB, covid symps. Awaiting SNF placement. Temp (24hrs), Av.9 °F (36.6 °C), Min:97.5 °F (36.4 °C), Max:98.3 °F (36.8 °C)     Physical Exam  Constitutional:       General: She is not in acute distress. Appearance: Normal appearance. She is not ill-appearing. HENT:      Head: Normocephalic and atraumatic. Cardiovascular:      Rate and Rhythm: Normal rate and regular rhythm. Pulses: Normal pulses. Heart sounds: Murmur heard. Systolic murmur is present with a grade of 3/6. Pulmonary:      Effort: Pulmonary effort is normal.      Breath sounds: Normal breath sounds. Abdominal:      General: Abdomen is flat. Palpations: Abdomen is soft. Tenderness: There is no abdominal tenderness. Musculoskeletal:      Right shoulder: Normal.      Left shoulder: Tenderness present. No effusion or crepitus. Decreased range of motion. Cervical back: Neck supple. No rigidity. Right lower leg: No edema. Left lower leg: No edema. Lymphadenopathy:      Cervical: No cervical adenopathy. Skin:     General: Skin is warm and dry. Capillary Refill: Capillary refill takes less than 2 seconds. Comments: L arm bruising from IV infiltration    Neurological:      General: No focal deficit present. Mental Status: She is alert.       Respiratory:   O2 Device: None (Room air)     I/O:  Date 10/02/22 0700 - 10/03/22 0659 10/03/22 0700 - 10/04/22 0659   Shift 0660-3428 2511-5230 24 Hour Total 5045-9703 9449-9378 24 Hour Total   INTAKE   Shift Total(mL/kg)         OUTPUT Urine(mL/kg/hr) 0(0) 1300(1.4) 1300(0.7)        Urine Voided 0 1300 1300        Urine Occurrence(s) 0 x  0 x      Emesis/NG output 0  0        Emesis 0  0        Emesis Occurrence(s) 0 x  0 x      Stool 0  0        Stool Occurrence(s) 1 x  1 x        Stool 0  0      Shift Total(mL/kg) 0(0) 1300(17.2) 1300(17.2)      NET 0 -1300 -1300      Weight (kg) 75.4 75.4 75.4 75.4 75.4 75.4       Inpatient Medications  Current Facility-Administered Medications   Medication Dose Route Frequency    polyethylene glycol (MIRALAX) packet 17 g  17 g Oral DAILY    spironolactone (ALDACTONE) tablet 12.5 mg  12.5 mg Oral DAILY    amLODIPine (NORVASC) tablet 10 mg  10 mg Oral DAILY    hydrALAZINE (APRESOLINE) 20 mg/mL injection 10 mg  10 mg IntraVENous Q6H PRN    oxyCODONE IR (ROXICODONE) tablet 5 mg  5 mg Oral Q4H PRN    senna-docusate (PERICOLACE) 8.6-50 mg per tablet 1 Tablet  1 Tablet Oral DAILY    lidocaine 4 % patch 1 Patch  1 Patch TransDERmal Q24H    therapeutic multivitamin (THERAGRAN) tablet 1 Tablet  1 Tablet Oral DAILY    labetaloL (NORMODYNE;TRANDATE) 20 mg/4 mL (5 mg/mL) injection 20 mg  20 mg IntraVENous Q6H PRN    acetaminophen (TYLENOL) tablet 1,000 mg  1,000 mg Oral TID    HYDROmorphone (DILAUDID) syringe 0.2 mg  0.2 mg IntraVENous Q4H PRN    losartan (COZAAR) tablet 100 mg  100 mg Oral DAILY    sodium chloride (NS) flush 5-40 mL  5-40 mL IntraVENous Q8H    sodium chloride (NS) flush 5-40 mL  5-40 mL IntraVENous PRN    acetaminophen (TYLENOL) tablet 650 mg  650 mg Oral Q6H PRN    Or    acetaminophen (TYLENOL) suppository 650 mg  650 mg Rectal Q6H PRN         Allergies  Allergies   Allergen Reactions    Hyzaar [Losartan-Hydrochlorothiazide] Vertigo    Pseudoephedrine Other (comments)     Rapid heart rate.      Naprosyn [Naproxen] Not Reported This Time         CBC:  Recent Labs     10/03/22  0007 10/02/22  0916 10/01/22  0533   WBC 3.6 3.8 3.0*   HGB 10.0* 11.1* 10.9*   HCT 29.7* 33.9* 32.7*    196 176 Metabolic Panel:  Recent Labs     10/03/22  0007 10/02/22  0916 10/01/22  0533    139 138   K 3.8 4.1 3.4*   * 108 109*   CO2 25 26 24   BUN 10 7 10   CREA 0.48* 0.48* 0.42*   * 129* 114*   CA 9.1 9.7 9.3            Assessment and Plan     Deejay Morales is a 80 y.o. female with a pmhx of HTN and recent covid infection. She was admitted for hypertensive urgency and L1 compression fracture. Hypertensive urgency in s/o existing HTN: IMPROVING. POA /109. Has been uncontrolled outpt. -215. CTA chest neg. EKG NSR. Cr wnl. ALT 94 > 43.  > 40.  - continue home Losartan 100 mg PO daily   - Amlodipine 10mg  - Continue spironolactone 12.5mg - monitor response (max effect at 2-3 days after initiation)   - Labetalol 20mg prn for SBP >180 or DBP > 110  - Continue to monitor      Compression Fracture GLD on 9/23 s/p L1 Kyphoplasty on 9/30 by IR. MRI: Compression deformity without significant canal compromise L1. Edema in the superior endplate of L4. Severe spinal canal stenosis L3 L5.   - Dilaudid 0.2mg q4h prn for severe pain  - Oxycodone 5mg q4h prn for moderate pain  - Tylenol 650mg prn q6h prn for mild pain  - ortho on consult appreciate recs    Covid Positive on 9/20, 9/29. Asymptomatic.   - Droplet precautions. FEN/GI - Regular diet. Activity -  Bed rest  DVT prophylaxis - SCDs  GI prophylaxis - Not indicated at this time  Disposition - Plan to d/c to [facility].   Code Status - Full    I appreciate the opportunity to participate in the care of this patient,  Carson Rivas MD  [facility] Medicine Resident       For Billing    Chief Complaint   Patient presents with    Back Pain    Hypertension       Hospital Problems  Date Reviewed: 8/25/2022            Codes Class Noted POA    COVID-19 ICD-10-CM: U07.1  ICD-9-CM: 079.89  10/2/2022 Unknown        Closed compression fracture of body of L1 vertebra ([facility] [address] ICD-10-CM: S32.010A  ICD-9-CM: 805.4  10/2/2022 Unknown * (Principal) Hypertensive urgency ICD-10-CM: I16.0  ICD-9-CM: 401.9  9/28/2022 Unknown        Compression fracture of L1 vertebra ([facility]) ICD-10-CM: S32.010A  ICD-9-CM: 805.4  9/28/2022 Unknown        COVID-19 virus infection ICD-10-CM: U07.1  ICD-9-CM: 079.89  9/20/2022 Unknown

## 2022-10-03 NOTE — PROGRESS NOTES
Problem: Risk for Spread of Infection  Goal: Prevent transmission of infectious organism to others  Description: Prevent the transmission of infectious organisms to other patients, staff members, and visitors. Outcome: Progressing Towards Goal     Problem: Hypertension  Goal: *Blood pressure within specified parameters  Outcome: Progressing Towards Goal  Goal: *Fluid volume balance  Outcome: Progressing Towards Goal  Goal: *Labs within defined limits  Outcome: Progressing Towards Goal     Problem: Falls - Risk of  Goal: *Absence of Falls  Description: Document Marcel Fall Risk and appropriate interventions in the flowsheet. Outcome: Progressing Towards Goal  Note: Fall Risk Interventions:  Mobility Interventions: Patient to call before getting OOB         Medication Interventions: Patient to call before getting OOB    Elimination Interventions: Call light in reach    History of Falls Interventions: Evaluate medications/consider consulting pharmacy         Problem: Airway Clearance - Ineffective  Goal: Achieve or maintain patent airway  Outcome: Progressing Towards Goal     Problem: Gas Exchange - Impaired  Goal: Absence of hypoxia  Outcome: Progressing Towards Goal  Goal: Promote optimal lung function  Outcome: Progressing Towards Goal     Problem: Breathing Pattern - Ineffective  Goal: Ability to achieve and maintain a regular respiratory rate  Outcome: Progressing Towards Goal     Problem:  Body Temperature -  Risk of, Imbalanced  Goal: Ability to maintain a body temperature within defined limits  Outcome: Progressing Towards Goal  Goal: Will regain or maintain usual level of consciousness  Outcome: Progressing Towards Goal  Goal: Complications related to the disease process, condition or treatment will be avoided or minimized  Outcome: Progressing Towards Goal     Problem: Isolation Precautions - Risk of Spread of Infection  Goal: Prevent transmission of infectious organism to others  Outcome: Progressing Towards Goal     Problem: Nutrition Deficits  Goal: Optimize nutrtional status  Outcome: Progressing Towards Goal     Problem: Risk for Fluid Volume Deficit  Goal: Maintain normal heart rhythm  Outcome: Progressing Towards Goal  Goal: Maintain absence of muscle cramping  Outcome: Progressing Towards Goal  Goal: Maintain normal serum potassium, sodium, calcium, phosphorus, and pH  Outcome: Progressing Towards Goal     Problem: Loneliness or Risk for Loneliness  Goal: Demonstrate positive use of time alone when socialization is not possible  Outcome: Progressing Towards Goal     Problem: Fatigue  Goal: Verbalize increase energy and improved vitality  Outcome: Progressing Towards Goal

## 2022-10-04 VITALS
RESPIRATION RATE: 18 BRPM | SYSTOLIC BLOOD PRESSURE: 163 MMHG | DIASTOLIC BLOOD PRESSURE: 65 MMHG | OXYGEN SATURATION: 95 % | HEART RATE: 95 BPM | BODY MASS INDEX: 26.73 KG/M2 | WEIGHT: 166.3 LBS | HEIGHT: 66 IN | TEMPERATURE: 98.5 F

## 2022-10-04 LAB
ANION GAP SERPL CALC-SCNC: 5 MMOL/L (ref 5–15)
BUN SERPL-MCNC: 11 MG/DL (ref 6–20)
BUN/CREAT SERPL: 23 (ref 12–20)
CALCIUM SERPL-MCNC: 9.4 MG/DL (ref 8.5–10.1)
CHLORIDE SERPL-SCNC: 109 MMOL/L (ref 97–108)
CO2 SERPL-SCNC: 25 MMOL/L (ref 21–32)
CREAT SERPL-MCNC: 0.48 MG/DL (ref 0.55–1.02)
ERYTHROCYTE [DISTWIDTH] IN BLOOD BY AUTOMATED COUNT: 14.2 % (ref 11.5–14.5)
GLUCOSE SERPL-MCNC: 117 MG/DL (ref 65–100)
HCT VFR BLD AUTO: 28.9 % (ref 35–47)
HGB BLD-MCNC: 9.3 G/DL (ref 11.5–16)
MCH RBC QN AUTO: 28.4 PG (ref 26–34)
MCHC RBC AUTO-ENTMCNC: 32.2 G/DL (ref 30–36.5)
MCV RBC AUTO: 88.1 FL (ref 80–99)
NRBC # BLD: 0 K/UL (ref 0–0.01)
NRBC BLD-RTO: 0 PER 100 WBC
PLATELET # BLD AUTO: 182 K/UL (ref 150–400)
PMV BLD AUTO: 8.9 FL (ref 8.9–12.9)
POTASSIUM SERPL-SCNC: 3.9 MMOL/L (ref 3.5–5.1)
RBC # BLD AUTO: 3.28 M/UL (ref 3.8–5.2)
SODIUM SERPL-SCNC: 139 MMOL/L (ref 136–145)
WBC # BLD AUTO: 4.5 K/UL (ref 3.6–11)

## 2022-10-04 PROCEDURE — 36415 COLL VENOUS BLD VENIPUNCTURE: CPT

## 2022-10-04 PROCEDURE — 74011250637 HC RX REV CODE- 250/637: Performed by: STUDENT IN AN ORGANIZED HEALTH CARE EDUCATION/TRAINING PROGRAM

## 2022-10-04 PROCEDURE — 99238 HOSP IP/OBS DSCHRG MGMT 30/<: CPT | Performed by: FAMILY MEDICINE

## 2022-10-04 PROCEDURE — 80048 BASIC METABOLIC PNL TOTAL CA: CPT

## 2022-10-04 PROCEDURE — 85027 COMPLETE CBC AUTOMATED: CPT

## 2022-10-04 PROCEDURE — 74011250637 HC RX REV CODE- 250/637: Performed by: FAMILY MEDICINE

## 2022-10-04 RX ORDER — AMLODIPINE BESYLATE 10 MG/1
10 TABLET ORAL DAILY
Qty: 30 TABLET | Refills: 0 | Status: SHIPPED | OUTPATIENT
Start: 2022-10-05 | End: 2022-10-31 | Stop reason: SDUPTHER

## 2022-10-04 RX ORDER — SPIRONOLACTONE 25 MG/1
12.5 TABLET ORAL DAILY
Qty: 15 TABLET | Refills: 0 | Status: SHIPPED | OUTPATIENT
Start: 2022-10-05 | End: 2022-11-04

## 2022-10-04 RX ORDER — ACETAMINOPHEN 500 MG
1000 TABLET ORAL
Qty: 42 TABLET | Refills: 0 | Status: SHIPPED | OUTPATIENT
Start: 2022-10-04 | End: 2022-10-11

## 2022-10-04 RX ADMIN — SENNOSIDES AND DOCUSATE SODIUM 1 TABLET: 50; 8.6 TABLET ORAL at 08:52

## 2022-10-04 RX ADMIN — ACETAMINOPHEN 1000 MG: 500 TABLET, FILM COATED ORAL at 08:52

## 2022-10-04 RX ADMIN — THERA TABS 1 TABLET: TAB at 08:52

## 2022-10-04 RX ADMIN — LOSARTAN POTASSIUM 100 MG: 50 TABLET, FILM COATED ORAL at 08:52

## 2022-10-04 RX ADMIN — SPIRONOLACTONE 12.5 MG: 25 TABLET ORAL at 08:52

## 2022-10-04 RX ADMIN — AMLODIPINE BESYLATE 10 MG: 5 TABLET ORAL at 08:52

## 2022-10-04 NOTE — DISCHARGE INSTRUCTIONS
HOME DISCHARGE INSTRUCTIONS    Sydni Gonzalez / [id number] : 3/30/1931    Admission date: 2022 Discharge date: 10/4/2022 12:04 PM     Please bring this form with you to show your care provider at your follow-up appointment. Primary care provider:  Gary Byers MD    Discharging provider:  Jenni Carter DO  - Family Medicine Resident  Noam Bowers MD - Family Medicine Attending      You have been admitted to the hospital with the following diagnoses:    ACUTE DIAGNOSES:  Hypertensive urgency [I16.0]  L1 Compression Fracture, Kyphoplasty    MEDICATION CHANGES  START  Amlodipine 10mg daily (1 tablet) - this if for your blood pressure  Spironolactone 12.5 mg daily (1/2 tablet) - this is for your blood pressure     No other changes were made to your medications, please take all your other home medicines as previously prescribed. . . . . . . . . . . . . . . . . . . . . . . . . . . . . . . . . . . . . . . . . . . . . . . . . . . . . . . . . . . . . . . . . . . . . . . . Lilliam Duckworth FOLLOW-UP CARE RECOMMENDATIONS:  - Please monitor your blood pressure at home and follow up with your PCP as below    Appointments    MD Gary Skinner MD PCP - General PCP - [address] [place] [address] Provider Family Medicine Pediatric Medicine [phone] [address]      Next Steps: Go in 4 day(s)  Appointment:   Instructions: @ 3:30 PM w/ Dr Jarod Camarena for a virtual visit        DIET/what to eat:  Regular Diet    ACTIVITY:  PT/OT per Home Health    Specific symptoms to watch for: severe headaches, chest pain, shortness of breath, fever, chills, nausea, vomiting, diarrhea, change in mentation, falling, weakness, bleeding. What to do if new or unexpected symptoms occur? If you experience any of the above symptoms (or should other concerns or questions arise after discharge) please call your primary care physician.  Return to the emergency room if you cannot get hold of your doctor. It is very important that you keep your follow-up appointment(s). Please bring discharge papers, medication list (and/or medication bottles) to your follow-up appointments for review by your outpatient provider(s). Please check the list of medications and be sure it includes every medication (even non-prescription medications) that your provider wants you to take. It is important that you take the medication exactly as they are prescribed. Keep your medication in the bottles provided by the pharmacist and keep a list of the medication names, dosages, and times to be taken in your wallet. Do not take other medications without consulting your doctor. If you have any questions about your medications or other instructions, please talk to your nurse or care provider before you leave the hospital.     Information obtained by:     I understand that if any problems occur once I am at home I am to contact my physician. These instructions were explained to me and I had the opportunity to ask questions. I understand and acknowledge receipt of the instructions indicated above.                                                                                                                                                Physician's or R.N.'s Signature                                                                  Date/Time                                                                                                                                              Patient or Representative Signature                                                          Date/Time

## 2022-10-04 NOTE — PROGRESS NOTES
MARJ:   1) Home with family, [facility] and [facility]   2) Family will drive pt home at time of discharge and as needed   3) Risk of readmission- 10% [place] [address] Day 5:   12:00 PM- Pt remains on Med. Surg- COVID-19 positive and stable for d/c. Pt's family does not wish for pt to go to SNF- wish to bring pt home with [facility] (RN, PT and OT)- [facility] previously accepted. CM updated orders to add [facility] RN and [facility] program. Pt's family will drive pt home at time of discharge and as needed- state no questions or concerns at this time. No further CM needs identified, RN informed. Care Management Interventions  PCP Verified by CM:  Yes  Mode of Transport at Discharge: Self  Transition of Care Consult (CM Consult): Discharge Planning, [address]: No  Reason Outside [facility]: Out of service area  [address] [place]: No  Discharge Durable Medical Equipment: No  Health Maintenance Reviewed: Yes  Physical Therapy Consult: Yes  Occupational Therapy Consult: Yes  Speech Therapy Consult: No  Support Systems: Child(reynaldo), Friend/Neighbor  Confirm Follow Up Transport: Family  The Plan for Transition of Care is Related to the Following Treatment Goals : Hypertensive urgency, closed compression fracture  The Patient and/or Patient Representative was Provided with a Choice of Provider and Agrees with the Discharge Plan?: Yes  Name of the Patient Representative Who was Provided with a Choice of Provider and Agrees with the Discharge Plan: Spoke with pt's dtr  [place] of Choice List was Provided with Basic Dialogue that Supports the Patient's Individualized Plan of Care/Goals, Treatment Preferences and Shares the Quality Data Associated with the Providers?: Yes  Discharge Location  Patient Expects to be Discharged to[de-identified] Home with home health     SERVANDO Rodriguez, [address]

## 2022-10-04 NOTE — DISCHARGE SUMMARY
[facility] [address] [address]   Office [phone]  Fax [phone]       Discharge / Transfer / Off-Service Note     Name: Miriam Gutierrez MRN: [id number]  Sex: Female   YOB: 1931  Age: 80 y.o. PCP: Anirudh Mcgarry MD     Date of admission: 9/28/2022  Date of discharge/transfer: 10/4/2022    Attending physician at admission: Max Howard. Attending physician at discharge/transfer: Anabell Turner MD  Resident physician at discharge/transfer: Jania Valencia MD     Consultants during hospitalization  IP CONSULT  W [facility] TO [address] [place] [address]     Admission diagnoses   Hypertensive urgency [I16.0]    Recommended follow-up after discharge    1. PCP-Yolie Schreiber MD     Things to follow up on with PCP:   BP well controlled on current regimen. Pain control s/p L1 Kyphoplasty for L1 Compression     Medication Changes:  Current Discharge Medication List        START taking these medications    Details   amLODIPine (NORVASC) 10 mg tablet Take 1 Tablet by mouth daily for 30 days. Indications: high blood pressure  Qty: 30 Tablet, Refills: 0  Start date: 10/5/2022, End date: 11/4/2022      acetaminophen (TYLENOL) 500 mg tablet Take 2 Tablets by mouth every six (6) hours as needed for Pain for up to 7 days. Indications: backache  Qty: 42 Tablet, Refills: 0  Start date: 10/4/2022, End date: 10/11/2022      spironolactone (ALDACTONE) 25 mg tablet Take 0.5 Tablets by mouth daily for 30 days. Indications: high blood pressure  Qty: 15 Tablet, Refills: 0  Start date: 10/5/2022, End date: 11/4/2022           CONTINUE these medications which have NOT CHANGED    Details   losartan (COZAAR) 100 mg tablet Take 100 mg by mouth daily.       iron [id number] (Multigen Folic) tab capsule TAKE 1 TABLET BY MOUTH EVERY DAY  Qty: 90 Tablet, Refills: 1              No other changes were made to your medications, please take all your other home medicines as previously prescribed. History of Present Illness    Rafa Zavaleta is a 80 y.o. female with a PMH of HTN, recent Covid infection (9/20) who presents to the ED complaining of back pain. History provided by patient and pt's daughter. Pt mentioned that she suffered a GLF on Friday 9/23 while she was going to the bathroom without using her can or walker, she felt like she lost her balance and felt back and hit the door frame, this fall was witnessed by her daughter, she did not LOC, however she was having head pain and was taken to the ER for evaluation on same day. They did CT head which was negative. The following day she started having mild lower back pain which was progressively worsening. Pain is dull, 7/10 in intensity, no radiate, constant, worse with movement, better with rest and staying still. She has been taking OTC pain medication w/o relief. Today daughter mentioned she was crying due to back pain and she decided to bring her to the ED. Patient denies fever, chills, SOB, CP, abdominal pain. Of note, pt tested positive for Covid on 9/20. She was having UR sx by that time. Received Paxlovid which caused her to have diarrhea and she decided to stop after the first dose. Sx has resolved for now. Hospital course    Hypertensive urgency in s/o existing HTN: IMPROVING. POA /109. Has been uncontrolled outpt. -215. CTA chest neg. EKG NSR. Cr wnl. ALT 94 > 43.  > 40. BP now 163/65.   - BP meds as above       L1 Compression Fracture GLD on 9/23 s/p L1 Kyphoplasty on 9/30 by IR. MRI: Compression deformity without significant canal compromise L1. Edema in the superior endplate of L4. Severe spinal canal stenosis L3 L5.   - Tylenol 1000 mg prn q6h prn for mild pain     Covid Positive on 9/20, 9/29. Asymptomatic. Physical exam at discharge:    Vitals Reviewed.  Patient Vitals for the past 12 hrs:   Temp Pulse Resp BP SpO2   10/04/22 1148 98.5 °F (36.9 °C) 95 18 (!) 163/65 95 %   10/04/22 0731 98 °F (36.7 °C) 94 16 (!) 171/64 92 %   10/04/22 0700 -- 87 -- -- --   10/04/22 0313 98.1 °F (36.7 °C) 95 16 (!) 149/63 93 %        General: No acute distress  Head: Normocephalic, atraumatic  Eyes: Conjunctiva pink  Neck: Supple  ENT: No rhinorrhea  CV: Heart: regular rate  Resp: Lungs: clear to auscultation bilaterally  GI: + bowel sounds, non-tender to palpation, non-distended. Back: No CVA tenderness  Extremities: No lower extremity edema  Skin: Warm, dry  Neuro: Awake, alert, and oriented. Condition at discharge: Stable. Labs  Recent Labs     10/04/22  0217 10/03/22  0007 10/02/22  0916   WBC 4.5 3.6 3.8   HGB 9.3* 10.0* 11.1*   HCT 28.9* 29.7* 33.9*    191 196     Recent Labs     10/04/22  0217 10/03/22  0007 10/02/22  0916    139 139   K 3.9 3.8 4.1   * 110* 108   CO2 25 25 26   BUN 11 10 7   CREA 0.48* 0.48* 0.48*   * 124* 129*   CA 9.4 9.1 9.7     No results for input(s): ALT, AP, TBIL, TBILI, TP, ALB, GLOB, GGT, AML, LPSE in the last 72 hours. No lab exists for component: SGOT, GPT, AMYP, HLPSE  No results for input(s): PH, PCO2, PO2, TNIPOC, TROIQ, INR, PTP, APTT, FE, TIBC, PSAT, FERR, GLUCPOC, INREXT, INREXT in the last 72 hours. No lab exists for component: GLPOC    Micro:  No results found for: CULT    Imaging:  IR KYPHOPLASTY LUMBAR    Result Date: 9/30/2022  Additional clinical data: 19-year-old female with L1 vertebral body painful osteoporotic compression fracture from fall. : Manju Parker M.D. Estimated blood loss: Minimal Specimens: None Complications: None Procedure: 1. Fluoroscopy guided placement of cannulas into the bilateral L1 pedicles. 2. Balloon kyphoplasty of the L1 vertebral body with cement augmentation. 3. IV conscious sedation. Moderate intravenous conscious sedation was supervised by Dr. Luís Link.  The patient was independently monitored by a registered nurse assigned to the Department of Radiology using automated blood pressure, EKG, and pulse oximetry. The detail conscious sedation record is stored in the hospital information system. Medication: Versed: 2 mg Fentanyl: 100 mcg Intraprocedure time: 50 minutes FLUOROSCOPY TIME: 4.1 min FLUOROSCOPY DOSE (air kerma): 149.3 mGy Body: Following discussion of risks, benefits and alternatives, informed written consent was obtained. The patient was placed prone on the fluoroscopic exam table and prepped and draped in sterile fashion. The correct level was identified fluoroscopically. 1% lidocaine was administered to the subcutaneous tissues for local anesthesia, and 0.25% bupivacaine was administered into the periosteum of of the bilateral pedicles under fluoroscopic guidance using a 22-gauge 10 cm spinal needle for local anesthesia. Under biplane fluoroscopic guidance, the left L1 pedicle was traversed with a Kyphon cannula. The tip of the cannula was placed just within the vertebral body. A drill was then advanced to within the anterior portion of the vertebral body. This procedure was repeated on the right L1 pedicle. Into each cannula, a kyphoplasty balloon was inserted. Both balloons were inflated with to a pressure of approximately 200 PSI. The balloons were deflated and removed. The cement was then injected bilaterally under fluoroscopic guidance. The devices were removed and a dressing was applied. There were no immediate complications. There was a small amount of extravasation of cement into some small paravertebral venous branches, however this was self-limited and there was no distal cement embolization. 1. Kyphoplasty of the L1 vertebral body. 2. A fracture has occurred and the patient should be considered for osteoporosis treatment or testing.      MRI LUMB SPINE W WO CONT    Result Date: 9/28/2022  EXAM: MRI LUMB SPINE W WO CONT INDICATION: fx. COMPARISON: January 2018 TECHNIQUE: MR imaging of the lumbar spine was performed using the following sequences: sagittal T1, T2, STIR;  axial T1, T2 prior to and following contrast administration. CONTRAST 13 cc IV ProHance FINDINGS: Compression deformity with anterior wedging at L1, acute. Minimal grade 1 spondylolisthesis L3-L4. The conus appears unremarkable. No enhancing lesion. Some edema also seen in the superior endplate of L4 possibly related to stress reaction. T12-L1 shows no focal findings. There is no significant canal compromise from the fracture at L1. L1-L2 show no focal findings. L2-L3 shows some mild central canal stenosis with moderate facet hypertrophy ligamentum thickening and disc bulging. L3-L4 shows severe spinal canal stenosis without definite lateralization. L4-5 shows severe central canal stenosis. No lateralization. L5-S1 show no definite canal stenosis, there is chronic disc degeneration facet hypertrophy and some ligamentum thickening. There is bilateral moderate foraminal compromise. Compression deformity without significant canal compromise L1. 2. Edema in the superior endplate of L4. 3. Severe spinal canal stenosis L3 L5.     CT HEAD WO CONT    Result Date: 9/23/2022  Indication:  fall, head strike Comparison: CT March 2012 Findings: 5 mm axial images were obtained from the skull base through the vertex. CT dose reduction was achieved through the use of a standardized protocol tailored for this examination and automatic exposure control for dose modulation. The ventricles and cortical sulci are prominent, compatible with age related volume loss. There is no evidence of intracranial hemorrhage, mass, mass effect, or acute infarct. There is periventricular white matter disease. No extra-axial fluid collections are seen. The visualized paranasal sinuses and mastoid air cells are clear. The orbital structures are unremarkable. No osseous abnormalities are seen. 1. No evidence of acute infarct or intracranial hemorrhage.  2. Periventricular white matter disease is likely secondary to chronic small vessel ischemic changes. CT SPINE CERV WO CONT    Result Date: 9/23/2022  INDICATION:   fall, head strike COMPARISON: None. TECHNIQUE:   Noncontrast axial CT imaging of the cervical spine was performed. Coronal and sagittal reconstructions were obtained. CT dose reduction was achieved through the use of a standardized protocol tailored for this examination and automatic exposure control for dose modulation. FINDINGS: There is no evidence of acute osseous abnormality. There is no acute alignment abnormality. Vertebral body heights are maintained. There is no appreciable prevertebral soft tissue swelling or epidural hematoma. There is multilevel degenerative spondylosis. There is multilevel bilateral neuroforaminal stenosis. Evaluation of the paraspinal soft tissues demonstrates no significant pathology. The visualized lung apices are clear. 1.  No acute osseous abnormality. 2. Multilevel degenerative spondylosis. CTA CHEST ABD PELV W CONT    Result Date: 9/28/2022  INDICATION: back pain, htn, concern for dissection, concern for L-spine injury EXAMINATION:  CT ANGIOGRAPHY CHEST ABDOMEN AND PELVIS COMPARISON: None  TECHNIQUE:  CT Angiography of the chest abdomen and pelvis was performed following the uneventful administration of IV contrast.  3D image postprocessing was performed. CT dose reduction was achieved through the use of a standardized protocol tailored for this examination and automatic exposure control for dose modulation. Maximum intensity projections were reconstructed. FINDINGS: THYROID: Unremarkable. MEDIASTINUM/QUYEN: No mass or lymphadenopathy. HEART/PERICARDIUM: Unremarkable. AORTA:  no evidence of aortic dissection or aneurysm. Mild atherosclerotic disease. Major branch vessels - moderate to severe stenosis of the celiac axis origin. Atherosclerotic disease in the SMA. 2 left renal arteries. Single right renal artery. Patent.  LUNGS: Small groundglass nodular opacities in the right upper lobe series 3 image 12. Bibasilar atelectasis. . Liver: Steatosis Gallbladder: Unremarkable Spleen: No mass. Pancreas: No mass or ductal dilatation. Adrenals: Nonspecific thickening of the right adrenal gland Kidneys: No mass, calculus, or hydronephrosis. Stomach: Unremarkable. Small bowel: No dilatation or wall thickening. Colon: Extensive colonic diverticulosis Appendix: Unremarkable. Peritoneum: No ascites or pneumoperitoneum. Retroperitoneum: No lymphadenopathy or aortic aneurysm. Reproductive organs: Unremarkable Urinary bladder: No mass or calculus. Bones: L1 fracture. With approximately 50% height loss centrally with mild patchy sclerosis. Additional comments: N/A     1. No evidence of aortic dissection. 2.  Vascular disease as described. 3.  L1 compression fracture with patchy sclerosis. This is age-indeterminate. Given the patchy sclerosis, pathologic fracture is not excluded. Consider MRI with contrast for further assessment. Procedures / Diagnostic Studies  MRI lumb spine w wo cont 9/28: 1.  Compression deformity without significant canal compromise L1. 2. Edema in the superior endplate of L4. 3. Severe spinal canal stenosis L3 L5  L1 Kyphoplasty on 9/30    Chronic diagnoses   Problem List as of 10/4/2022 Date Reviewed: 8/25/2022            Codes Class Noted - Resolved    COVID-19 ICD-10-CM: U07.1  ICD-9-CM: 079.89  10/2/2022 - Present        Closed compression fracture of body of L1 vertebra ([facility] ICD-10-CM: S32.010A  ICD-9-CM: 805.4  10/2/2022 - Present        * (Principal) Hypertensive urgency ICD-10-CM: I16.0  ICD-9-CM: 401.9  9/28/2022 - Present        Compression fracture of L1 vertebra ([facility] ICD-10-CM: S32.010A  ICD-9-CM: 805.4  9/28/2022 - Present        COVID-19 virus infection ICD-10-CM: U07.1  ICD-9-CM: 079.89  9/20/2022 - Present        At moderate risk for fall ICD-10-CM: Z91.81  ICD-9-CM: V15.88  7/26/2022 - Present        Bilateral carpal tunnel syndrome ICD-10-CM: G56.03  ICD-9-CM: 354.0  6/15/2022 - Present        Trigger finger of right thumb ICD-10-CM: V12.204  ICD-9-CM: 727.03  6/15/2022 - Present        Neuropathy ICD-10-CM: G62.9  ICD-9-CM: 355.9  1/22/2018 - Present        Absolute anemia ICD-10-CM: D64.9  ICD-9-CM: 285.9  4/4/2017 - Present        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  ICD-9-CM: 375.15  11/8/2016 - Present        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 Indications: high blood pressure  Qty: 30 Tablet, Refills: 0  Start date: 10/5/2022, End date: 11/4/2022      acetaminophen (TYLENOL) 500 mg tablet Take 2 Tablets by mouth every six (6) hours as needed for Pain for up to 7 days. Indications: backache  Qty: 42 Tablet, Refills: 0  Start date: 10/4/2022, End date: 10/11/2022      spironolactone (ALDACTONE) 25 mg tablet Take 0.5 Tablets by mouth daily for 30 days. Indications: high blood pressure  Qty: 15 Tablet, Refills: 0  Start date: 10/5/2022, End date: 11/4/2022           CONTINUE these medications which have NOT CHANGED    Details   losartan (COZAAR) 100 mg tablet Take 100 mg by mouth daily. iron [id number] (Multigen Folic) tab capsule TAKE 1 TABLET BY MOUTH EVERY DAY  Qty: 90 Tablet, Refills: 1              Diet:  Regular diet.     Activity:  As tolerated     Disposition: Home or Self Care    Discharge instructions to patient/family  Please seek medical attention for any new or worsening symptoms particularly fever, chest pain, shortness of breath, abdominal pain, nausea, vomiting    Follow up plans/appointments  Follow-up Information       Follow up With Specialties Details Why Contact Gilda Soto MD Family Medicine, Pediatric Medicine Go in 4 day(s) @ 3:30 PM w/ Dr Stacey Ramon for a virtual visit [place]   [id number] [id number]  [place] [address] Services Follow up  [address] Leon MD  Family Medicine Resident       For Billing    Chief Complaint   Patient presents with    Back Pain    Hypertension       Hospital Problems  Date Reviewed: 8/25/2022            Codes Class Noted POA    COVID-19 ICD-10-CM: U07.1  ICD-9-CM: 079.89  10/2/2022 Unknown        Closed compression fracture of body of L1 vertebra ([facility] ICD-10-CM: S32.010A  ICD-9-CM: 805.4  10/2/2022 Unknown        * (Principal) Hypertensive urgency ICD-10-CM: I16.0  ICD-9-CM: 401.9  9/28/2022 Unknown        Compression fracture of L1 vertebra ([facility] ICD-10-CM: S32.010A  ICD-9-CM: 805.4  9/28/2022 Unknown        COVID-19 virus infection ICD-10-CM: U07.1  ICD-9-CM: 079.89  9/20/2022 Unknown

## 2022-10-04 NOTE — PROGRESS NOTES
Subjective / Objective     Subjective  Overnight Events: NAEO. Patient seen and examined at bedside. Reports feeling well this AM. L shoulder pain has improved. Denies CP, SOB, N/V, dysuria. Respiratory: O2 Device: None (Room air)   Visit Vitals  BP (!) 171/64 (BP 1 Location: Right upper arm, BP Patient Position: At rest)   Pulse 94   Temp 98 °F (36.7 °C)   Resp 16   Ht 5' 6\" (1.676 m)   Wt 166 lb 4.8 oz (75.4 kg)   SpO2 92%   BMI 26.84 kg/m²     Physical Examination:   General appearance - alert, well appearing, and in no distress  Chest - clear to auscultation, no wheezes, rales or rhonchi, symmetric air entry  Heart - normal rate, regular rhythm, normal S1, S2. No rubs, clicks or gallops. 3/6 systolic murmur. Abdomen - soft, nontender, nondistended, no masses or organomegaly  Neurological - alert, oriented, normal speech, no focal findings  Skin - warm, dry. L arm bruising from IV infiltration  Extremities - peripheral pulses normal, no pedal edema, no clubbing or cyanosis. ROM and tenderness of left shoulder improved, mild.    Psychiatric - normal speech and thought processes      I/O:  Date 10/03/22 0700 - 10/04/22 0659 10/04/22 0700 - 10/05/22 0659   Shift 3711-7973 4042-5718 24 Hour Total 4885-4143 0258-5997 24 Hour Total   INTAKE   Shift Total(mL/kg)         OUTPUT   Urine(mL/kg/hr)  900(1) 900(0.5)        Urine Voided  900 900        Urine Occurrence(s)  1 x 1 x      Shift Total(mL/kg)  900(11.9) 900(11.9)      NET  -900 -900      Weight (kg) 75.4 75.4 75.4 75.4 75.4 75.4       Inpatient Medications  Current Facility-Administered Medications   Medication    polyethylene glycol (MIRALAX) packet 17 g    spironolactone (ALDACTONE) tablet 12.5 mg    amLODIPine (NORVASC) tablet 10 mg    hydrALAZINE (APRESOLINE) 20 mg/mL injection 10 mg    oxyCODONE IR (ROXICODONE) tablet 5 mg    senna-docusate (PERICOLACE) 8.6-50 mg per tablet 1 Tablet    lidocaine 4 % patch 1 Patch    therapeutic multivitamin [facility]) tablet 1 Tablet    labetaloL (NORMODYNE;TRANDATE) 20 mg/4 mL (5 mg/mL) injection 20 mg    acetaminophen (TYLENOL) tablet 1,000 mg    HYDROmorphone (DILAUDID) syringe 0.2 mg    losartan (COZAAR) tablet 100 mg    sodium chloride (NS) flush 5-40 mL    sodium chloride (NS) flush 5-40 mL    acetaminophen (TYLENOL) tablet 650 mg    Or    acetaminophen (TYLENOL) suppository 650 mg     Current Facility-Administered Medications   Medication Dose Route Frequency    polyethylene glycol (MIRALAX) packet 17 g  17 g Oral DAILY    spironolactone (ALDACTONE) tablet 12.5 mg  12.5 mg Oral DAILY    amLODIPine (NORVASC) tablet 10 mg  10 mg Oral DAILY    hydrALAZINE (APRESOLINE) 20 mg/mL injection 10 mg  10 mg IntraVENous Q6H PRN    oxyCODONE IR (ROXICODONE) tablet 5 mg  5 mg Oral Q4H PRN    senna-docusate (PERICOLACE) 8.6-50 mg per tablet 1 Tablet  1 Tablet Oral DAILY    lidocaine 4 % patch 1 Patch  1 Patch TransDERmal Q24H    therapeutic multivitamin (THERAGRAN) tablet 1 Tablet  1 Tablet Oral DAILY    labetaloL (NORMODYNE;TRANDATE) 20 mg/4 mL (5 mg/mL) injection 20 mg  20 mg IntraVENous Q6H PRN    acetaminophen (TYLENOL) tablet 1,000 mg  1,000 mg Oral TID    HYDROmorphone (DILAUDID) syringe 0.2 mg  0.2 mg IntraVENous Q4H PRN    losartan (COZAAR) tablet 100 mg  100 mg Oral DAILY    sodium chloride (NS) flush 5-40 mL  5-40 mL IntraVENous Q8H    sodium chloride (NS) flush 5-40 mL  5-40 mL IntraVENous PRN    acetaminophen (TYLENOL) tablet 650 mg  650 mg Oral Q6H PRN    Or    acetaminophen (TYLENOL) suppository 650 mg  650 mg Rectal Q6H PRN     Allergies  Allergies   Allergen Reactions    Hyzaar [Losartan-Hydrochlorothiazide] Vertigo    Pseudoephedrine Other (comments)     Rapid heart rate.      Naprosyn [Naproxen] Not Reported This Time     CBC:  Recent Labs     10/04/22  0217 10/03/22  0007 10/02/22  0916   WBC 4.5 3.6 3.8   HGB 9.3* 10.0* 11.1*    736 494     Metabolic Panel:  Recent Labs     10/04/22  0212 10/03/22  0007 10/02/22  0916    139 139   K 3.9 3.8 4.1   * 110* 108   CO2 25 25 26   BUN 11 10 7   CREA 0.48* 0.48* 0.48*   * 124* 129*   CA 9.4 9.1 9.7     Imaging/procedures:   IR KYPHOPLASTY LUMBAR    Result Date: 9/30/2022  1. Kyphoplasty of the L1 vertebral body. 2. A fracture has occurred and the patient should be considered for osteoporosis treatment or testing. MRI LUMB SPINE W WO CONT    Result Date: 9/28/2022  Compression deformity without significant canal compromise L1. 2. Edema in the superior endplate of L4. 3. Severe spinal canal stenosis L3 L5. CTA CHEST ABD PELV W CONT    Result Date: 9/28/2022  1. No evidence of aortic dissection. 2.  Vascular disease as described. 3.  L1 compression fracture with patchy sclerosis. This is age-indeterminate. Given the patchy sclerosis, pathologic fracture is not excluded. Consider MRI with contrast for further assessment. Assessment and Plan     Tyree Parham is a 80 y.o. female admitted for hypertensive urgency and L1 compression fracture. Hypertensive urgency in s/o existing HTN: IMPROVING. POA /109. Has been uncontrolled outpt. -215. CTA chest neg. EKG NSR. Cr wnl. ALT 94 > 43.  > 40.  - Continue home Losartan 100 mg PO daily   - Amlodipine 10mg  - Continue spironolactone 12.5mg - monitor response (max effect at 2-3 days after initiation)   - Labetalol 20mg prn for SBP >180 or DBP > 110  - Continue to monitor       L1 Compression Fracture GLD on 9/23 s/p L1 Kyphoplasty on 9/30 by IR. MRI: Compression deformity without significant canal compromise L1. Edema in the superior endplate of L4. Severe spinal canal stenosis L3 L5.   - Dilaudid 0.2mg q4h prn for severe pain  - Oxycodone 5mg q4h prn for moderate pain  - Tylenol 650mg prn q6h prn for mild pain  - ortho on consult appreciate recs  - Awaiting SNF placement acceptance      Covid Positive on 9/20, 9/29. Asymptomatic.   - Droplet precautions. Patient will be discussed with MD Inessa Granda MD  Family Medicine Resident       For Billing    Chief Complaint   Patient presents with    Back Pain    Hypertension       Hospital Problems  Date Reviewed: 8/25/2022            Codes Class Noted POA    COVID-19 ICD-10-CM: U07.1  ICD-9-CM: 079.89  10/2/2022 Unknown        Closed compression fracture of body of L1 vertebra ([facility] ICD-10-CM: S32.010A  ICD-9-CM: 805.4  10/2/2022 Unknown        * (Principal) Hypertensive urgency ICD-10-CM: I16.0  ICD-9-CM: 401.9  9/28/2022 Unknown        Compression fracture of L1 vertebra ([facility] ICD-10-CM: S32.010A  ICD-9-CM: 805.4  9/28/2022 Unknown        COVID-19 virus infection ICD-10-CM: U07.1  ICD-9-CM: 079.89  9/20/2022 Unknown

## 2022-10-04 NOTE — PROGRESS NOTES
Discharge instructions/AVS discussed in detail with pt. Pt verbalizes understanding of all instructions, including where to get new medications. Pt denies any questions about instructions and has signed paper copy AVS. Pt discharged per MD order, being driven home by daughter. Pt was transferred to car via wheelchair. Pt in no apparent distress at time of discharge with no complaints. IV removed.

## 2022-10-04 NOTE — PROGRESS NOTES
Bedside and Verbal shift change report given to [facility] (oncoming nurse) by Paty Marrero RN (offgoing nurse). Report included the following information SBAR, Kardex, Intake/Output, MAR, Accordion, and Recent Results.

## 2022-10-04 NOTE — PROGRESS NOTES
10/04/22      Medicare pt has received Medicare 2nd IMM letter. Due to Contact Precautions letter was left attached outside of patients door and copy placed in chart will call patients family member listed on chart to give details of the letter.

## 2022-10-05 ENCOUNTER — PATIENT OUTREACH (OUTPATIENT)
Dept: CASE MANAGEMENT | Age: 87
End: 2022-10-05

## 2022-10-05 NOTE — PROGRESS NOTES
Care Transitions Initial Call    Call within 2 business days of discharge: Yes     Patient: Narinder Rincon Patient : 3/30/1931 MRN: [id number]    Last Discharge  Street       Date Complaint Diagnosis Description Type Department Provider    22 Back Pain; Hypertension Hypertensive urgency . .. ED to Hosp-Admission (Discharged) (ADMIT) [id number] Shannon Shepherd MD; Arabella Rogers. .. Was this an external facility discharge? No Discharge Facility: [facility]    Challenges to be reviewed by the provider   Additional needs identified to be addressed with provider: no         Method of communication with provider : none    Discussed COVID-19 related testing which was available at this time. Test results were positive. Patient informed of results, if available? yes     Advance Care Planning:   Does patient have an Advance Directive: not on file. Inpatient Readmission Risk score: Unplanned Readmit Risk Score: 10.1    Was this a readmission? no   Patient stated reason for the admission: \" fall\"    Patients top risk factors for readmission:  none noted    Interventions to address risk factors: Obtained and reviewed discharge summary and/or continuity of care documents    Care Transition Nurse (CTN) contacted the family by telephone to perform post hospital discharge assessment. Verified name and  with family as identifiers. Provided introduction to self, and explanation of the CTN role. CTN reviewed discharge instructions, medical action plan and red flags with family who verbalized understanding. Were discharge instructions available to patient? yes. Reviewed appropriate site of care based on symptoms and resources available to patient including: PCP and Specialist. Patient given an opportunity to ask questions and does not have any further questions or concerns at this time. The patient agrees to contact the PCP office for questions related to their healthcare.      Medication reconciliation was performed with patient, who verbalizes understanding of administration of home medications. Advised obtaining a 90-day supply of all daily and as-needed medications. Referral to Pharm D needed: no     Home Health/Outpatient orders at discharge: home health care, PT, OT, and [address]  Date of initial visit: 10/05/2022    Durable Medical Equipment ordered at discharge: none    Was patient discharged with a pulse oximeter? no    Discussed follow-up appointments. If no appointment was previously scheduled, appointment scheduling offered: yes. Is follow up appointment scheduled within 7 days of discharge? yes. [facility] follow up appointment(s):   Future Appointments   Date Time Provider Josh Millard   10/12/2022 11:30 AM Cody Wallis NP PMA BS AMB     Non-BS follow up appointment(s): none    Plan for follow-up call in 5-7 days based on severity of symptoms and risk factors. Plan for next call:  follow up   CTN provided contact information for future needs. Goals Addressed                   This Visit's Progress     Attends follow-up appointments as directed. 10/5/2022  -Will attend PCP follow up on 10/12/2022  -CTN to follow up in 1 week  SP       Prevent complications post hospitalization. 10/5/2022  -Will monitor BP daily and record. Will take record to PCP office follow up. Will report BP >180/100 or <90/50.    -CTN to follow up in 1 week  SP

## 2022-10-12 ENCOUNTER — VIRTUAL VISIT (OUTPATIENT)
Dept: FAMILY MEDICINE CLINIC | Age: 87
End: 2022-10-12
Payer: MEDICARE

## 2022-10-12 DIAGNOSIS — Z09 HOSPITAL DISCHARGE FOLLOW-UP: ICD-10-CM

## 2022-10-12 DIAGNOSIS — I16.0 HYPERTENSIVE URGENCY: ICD-10-CM

## 2022-10-12 DIAGNOSIS — S32.010A COMPRESSION FRACTURE OF L1 VERTEBRA, INITIAL ENCOUNTER (HCC): Primary | ICD-10-CM

## 2022-10-12 PROCEDURE — G8536 NO DOC ELDER MAL SCRN: HCPCS | Performed by: NURSE PRACTITIONER

## 2022-10-12 PROCEDURE — G8427 DOCREV CUR MEDS BY ELIG CLIN: HCPCS | Performed by: NURSE PRACTITIONER

## 2022-10-12 PROCEDURE — 1111F DSCHRG MED/CURRENT MED MERGE: CPT | Performed by: NURSE PRACTITIONER

## 2022-10-12 PROCEDURE — 99213 OFFICE O/P EST LOW 20 MIN: CPT | Performed by: NURSE PRACTITIONER

## 2022-10-12 PROCEDURE — 1123F ACP DISCUSS/DSCN MKR DOCD: CPT | Performed by: NURSE PRACTITIONER

## 2022-10-12 PROCEDURE — G0463 HOSPITAL OUTPT CLINIC VISIT: HCPCS | Performed by: NURSE PRACTITIONER

## 2022-10-12 PROCEDURE — 1090F PRES/ABSN URINE INCON ASSESS: CPT | Performed by: NURSE PRACTITIONER

## 2022-10-12 PROCEDURE — 1101F PT FALLS ASSESS-DOCD LE1/YR: CPT | Performed by: NURSE PRACTITIONER

## 2022-10-12 PROCEDURE — G8432 DEP SCR NOT DOC, RNG: HCPCS | Performed by: NURSE PRACTITIONER

## 2022-10-12 PROCEDURE — G8417 CALC BMI ABV UP PARAM F/U: HCPCS | Performed by: NURSE PRACTITIONER

## 2022-10-14 ENCOUNTER — PATIENT OUTREACH (OUTPATIENT)
Dept: CASE MANAGEMENT | Age: 87
End: 2022-10-14

## 2022-10-14 NOTE — PROGRESS NOTES
Care Transitions Follow Up Call    Challenges to be reviewed by the provider   Additional needs identified to be addressed with provider: no           Method of communication with provider : none    Care Transition Nurse (CTN) contacted the family by telephone to follow up after admission on 2022. Verified name and  with family as identifiers. Addressed changes since last contact: none  Follow up appointment completed? yes. Was follow up appointment scheduled within 7 days of discharge? yes. Advance Care Planning:   Does patient have an Advance Directive:   None    CTN reviewed discharge instructions, medical action plan and red flags with family and discussed any barriers to care and/or understanding of plan of care after discharge. Discussed appropriate site of care based on symptoms and resources available to patient including: PCP and Specialist. The family agrees to contact the PCP office for questions related to their healthcare. Patients top risk factors for readmission:  none noted    Interventions to address risk factors: Obtained and reviewed discharge summary and/or continuity of care documents    [facility](s): No future appointments. Non-[facility] follow up appointment(s): none    CTN provided contact information for future needs. Plan for follow-up call in 5-7 days based on severity of symptoms and risk factors. Plan for next call:  follow up        Goals Addressed                   This Visit's Progress     Attends follow-up appointments as directed. On track     10/14/2022  -Attended follow up with PCP on 10/12/2022  -No pending appointments at this time  -CTN to follow up in 2 weeks  SP  10/5/2022  -Will attend PCP follow up on 10/12/2022  -CTN to follow up in 1 week  SP       Prevent complications post hospitalization. On track     10/14/2022  -BP in range. Will continue to monitor daily and record.  -Daughter purchased a Life Alert system.  Will set up with weekend  -CTN to follow up in 1 week  SP  10/5/2022  -Will monitor BP daily and record. Will take record to PCP office follow up. Will report BP >180/100 or <90/50.    -CTN to follow up in 1 week  SP

## 2022-10-26 ENCOUNTER — PATIENT OUTREACH (OUTPATIENT)
Dept: CASE MANAGEMENT | Age: 87
End: 2022-10-26

## 2022-10-27 NOTE — PROGRESS NOTES
Care Transitions Outreach Attempt  Attempted to reach patient for transitions of care follow up. Unable to reach patient. Patient: Cris Palacios Patient : 3/30/1931 MRN: [id number]    Last Discharge  Street       Date Complaint Diagnosis Description Type Department Provider    22 Back Pain; Hypertension Hypertensive urgency . .. ED to Hosp-Admission (Discharged) (ADMIT) [id number] Yessenia Wilson MD; Virgle Aase. ..             Noted following upcoming appointments from discharge chart review:   [facility] follow up appointment(s):   Future Appointments   Date Time Provider Josh Millard   2022  1:40 PM Jimena Clay MD [facility] BS AMB     Non-BS follow up appointment(s): none

## 2022-10-31 RX ORDER — AMLODIPINE BESYLATE 10 MG/1
10 TABLET ORAL DAILY
Qty: 30 TABLET | Refills: 0 | Status: SHIPPED | OUTPATIENT
Start: 2022-10-31 | End: 2022-11-11

## 2022-11-08 ENCOUNTER — PATIENT OUTREACH (OUTPATIENT)
Dept: CASE MANAGEMENT | Age: 87
End: 2022-11-08

## 2022-11-08 NOTE — PROGRESS NOTES
Patient has graduated from the Transitions of Care Coordination  program on 11/8/2022. Patient/family has the ability to self-manage at this time Care management goals have been completed. Patient was not referred to the [facility] [place] team for further management. Goals Addressed                   This Visit's Progress     COMPLETED: Attends follow-up appointments as directed. 10/14/2022  -Attended follow up with PCP on 10/12/2022  -No pending appointments at this time  -CTN to follow up in 2 weeks  SP  10/5/2022  -Will attend PCP follow up on 10/12/2022  -CTN to follow up in 1 week  SP       COMPLETED: Prevent complications post hospitalization. 10/14/2022  -BP in range. Will continue to monitor daily and record.  -Daughter purchased a Life Alert system. Will set up with weekend  -CTN to follow up in 1 week  SP  10/5/2022  -Will monitor BP daily and record. Will take record to PCP office follow up. Will report BP >180/100 or <90/50. -CTN to follow up in 1 week  SP              Patient has Care Transition Nurse's contact information for any further questions, concerns, or needs.   Patients upcoming visits:    Future Appointments   Date Time Provider Josh Millard   11/11/2022  1:40 PM Ranulfo Clay MD [facility] BS AMB

## 2022-11-09 RX ORDER — AMLODIPINE BESYLATE 10 MG/1
TABLET ORAL
Qty: 30 TABLET | Refills: 0 | Status: SHIPPED | OUTPATIENT
Start: 2022-11-09

## 2022-11-11 ENCOUNTER — OFFICE VISIT (OUTPATIENT)
Dept: CARDIOLOGY CLINIC | Age: 87
End: 2022-11-11
Payer: MEDICARE

## 2022-11-11 VITALS
BODY MASS INDEX: 23.95 KG/M2 | OXYGEN SATURATION: 100 % | HEART RATE: 96 BPM | SYSTOLIC BLOOD PRESSURE: 144 MMHG | DIASTOLIC BLOOD PRESSURE: 76 MMHG | WEIGHT: 149 LBS | RESPIRATION RATE: 20 BRPM | HEIGHT: 66 IN

## 2022-11-11 DIAGNOSIS — I16.0 HYPERTENSIVE URGENCY: ICD-10-CM

## 2022-11-11 DIAGNOSIS — R01.1 MURMUR: ICD-10-CM

## 2022-11-11 DIAGNOSIS — Z76.89 ESTABLISHING CARE WITH NEW DOCTOR, ENCOUNTER FOR: Primary | ICD-10-CM

## 2022-11-11 PROCEDURE — G0463 HOSPITAL OUTPT CLINIC VISIT: HCPCS | Performed by: INTERNAL MEDICINE

## 2022-11-11 PROCEDURE — 99204 OFFICE O/P NEW MOD 45 MIN: CPT | Performed by: INTERNAL MEDICINE

## 2022-11-11 PROCEDURE — 93010 ELECTROCARDIOGRAM REPORT: CPT | Performed by: INTERNAL MEDICINE

## 2022-11-11 PROCEDURE — 93005 ELECTROCARDIOGRAM TRACING: CPT | Performed by: INTERNAL MEDICINE

## 2022-11-11 PROCEDURE — 1090F PRES/ABSN URINE INCON ASSESS: CPT | Performed by: INTERNAL MEDICINE

## 2022-11-11 NOTE — PROGRESS NOTES
All orders entered per verbal orders of Dr. Tea Littlejohn. Agustin Arango MD  Echo- Murmur, HTN     See Dr. Agustin Arango in 1 year.

## 2022-11-11 NOTE — PROGRESS NOTES
Reason to see patient: HTN    Referring: Jessica Gregorio MD    HPI: Justus Montero is a 80 y.o. female with past medical history significant for hypertension, meningitis, normal pressure hydrocephalus, had a fall at home and was admitted to the [facility] on September 28, 2022. She had L1 fracture. She underwent kyphoplasty. During the admission she was noted to have high blood pressure. Her blood pressure medications were more streamlined and she was discharged home on losartan, amlodipine and Aldactone. Home care nurse had found her diastolic blood pressure to be low in the 40s therefore her spironolactone was discontinued. She is currently only on amlodipine and losartan. She also has noticed mild bilateral ankle edema since discharge to the home. She did not have ankle edema previously. EKG today demonstrated normal sinus rhythm with right bundle branch block with normal axis    Plan:    1. Hypertension: Blood pressure is now better controlled with amlodipine and losartan. We will continue this medication. We will except a wider margin for her blood pressure. Even if blood pressure is ranging between 140 and 180 that would be okay for her. I would not recommend any knee-jerk reaction to her blood pressure. She used to run high blood pressures previously as well. 2.  Bilateral lower extremity edema: This is likely related to amlodipine. Check echocardiogram.    3. Murmur- Echo    4. See Dr. Yon Grady in 1 year. ATTENTION:   This medical record was transcribed using an electronic medical records/speech recognition system. Although proofread, it may and can contain electronic, spelling and other errors. Corrections may be executed at a later time. Please feel free to contact us for any clarifications as needed.       Past Medical History:   Diagnosis Date    Anemia     Sees Dr. Erickson Liu and is on a prescription multivitamin call Multigen which has corrected her anemia    Headache     Hypertension     Intracranial hypotension 1998    had blood patch to correct    Meningitis 1950 & 2011    Osteochondritis 1975    Snoring             Past Surgical History:   Procedure Laterality Date    HX CATARACT REMOVAL  1997    HX DILATION AND CURETTAGE  1972    HX OTHER SURGICAL  1975    osteochroditis    HX OTHER SURGICAL  1998    blood patch for intercranial spontaneious hypotension     IR KYPHOPLASTY LUMBAR  9/30/2022             Family History   Problem Relation Age of Onset    Lung Disease Brother         lung cancer    Diabetes Mother     Thyroid Disease Mother         goiter    Diabetes Sister     Heart Disease Sister     Heart Disease Father     Stroke Father     Hypertension Brother     Stroke Brother     Cancer Brother     Parkinson's Disease Sister     Other Sister         brain aneurysm    Alzheimer's Disease Sister            Social History     Socioeconomic History    Marital status:      Spouse name: Not on file    Number of children: Not on file    Years of education: Not on file    Highest education level: Not on file   Occupational History    Not on file   Tobacco Use    Smoking status: Former    Smokeless tobacco: Never   Substance and Sexual Activity    Alcohol use: Yes     Alcohol/week: 1.0 standard drink     Types: 1 Glasses of wine per week    Drug use: No    Sexual activity: Never   Other Topics Concern    Not on file   Social History Narrative    Not on file     Social Determinants of Health     Financial Resource Strain: Not on file   Food Insecurity: Not on file   Transportation Needs: Not on file   Physical Activity: Not on file   Stress: Not on file   Social Connections: Not on file   Intimate Partner Violence: Not on file   Housing Stability: Not on file         Allergies   Allergen Reactions    Hyzaar [Losartan-Hydrochlorothiazide] Vertigo    Pseudoephedrine Other (comments)     Rapid heart rate.      Naprosyn [Naproxen] Not Reported This Time Current Outpatient Medications   Medication Sig Dispense Refill    amLODIPine (NORVASC) 10 mg tablet TAKE 1 TABLET BY MOUTH ONCE DAILY FOR HIGH BLOOD PRESSURE FOR 30 DAYS 30 Tablet 0    losartan (COZAAR) 100 mg tablet Take 100 mg by mouth daily. iron [id number] (Multigen Folic) tab capsule TAKE 1 TABLET BY MOUTH EVERY DAY 90 Tablet 1    ASPIRIN/SALICYLAMIDE/CAFFEINE (BC HEADACHE POWDER PO) Take  by mouth as needed. ROS:  12 point review of systems was performed. All negative except for HPI     Physical Exam:  Visit Vitals  BP (!) 144/76   Pulse 96   Resp 20   Ht 5' 6\" (1.676 m)   Wt 149 lb (67.6 kg)   SpO2 100%   BMI 24.05 kg/m²       Gen:  Well-developed, well-nourished, in no acute distress  HEENT:  Pink conjunctivae, PERRL, hearing intact to voice, moist mucous membranes  Neck:  Supple, without masses, thyroid non-tender  Resp:  No accessory muscle use, clear breath sounds without wheezes rales or rhonchi  Card:  ESM grade 3/6 in aortic region.  Normal S1, S2 without thrills, bruits or peripheral edema  Abd:  Soft, non-tender, non-distended, normoactive bowel sounds are present, no palpable organomegaly and no detectable hernias  Lymph:  No cervical or inguinal adenopathy  Musc:  No cyanosis or clubbing  Skin:  No rashes or ulcers, skin turgor is good  Neuro:  Cranial nerves are grossly intact, no focal motor weakness, follows commands appropriately  Psych:  Good insight, oriented to person, place and time, alert     Labs:     Lab Results   Component Value Date/Time    WBC 4.5 10/04/2022 02:17 AM    HGB 9.3 (L) 10/04/2022 02:17 AM    HCT 28.9 (L) 10/04/2022 02:17 AM    PLATELET 809 69/99/8959 02:17 AM    MCV 88.1 10/04/2022 02:17 AM     Lab Results   Component Value Date/Time    Glucose 117 (H) 10/04/2022 02:17 AM    Glucose 104 (H) 01/24/2018 10:08 AM    LDL, calculated 138.2 (H) 08/02/2022 08:15 AM    Creatinine 0.48 (L) 10/04/2022 02:17 AM      Lab Results   Component Value Date/Time    Cholesterol, total 210 (H) 08/02/2022 08:15 AM    HDL Cholesterol 49 08/02/2022 08:15 AM    LDL, calculated 138.2 (H) 08/02/2022 08:15 AM    Triglyceride 114 08/02/2022 08:15 AM    CHOL/HDL Ratio 4.3 08/02/2022 08:15 AM     Lab Results   Component Value Date/Time    ALT (SGPT) 43 09/28/2022 09:50 AM    Alk.  phosphatase 63 09/28/2022 09:50 AM    Bilirubin, total 0.6 09/28/2022 09:50 AM    Albumin 3.0 (L) 09/28/2022 09:50 AM    Protein, total 6.5 09/28/2022 09:50 AM    PLATELET 282 94/11/7875 02:17 AM     No results found for: INR, INREXT, PTMR, PTP, PT1, PT2, INREXT   Lab Results   Component Value Date/Time    GFR est non-AA >60 10/03/2022 12:07 AM    GFR est AA >60 10/03/2022 12:07 AM    Creatinine 0.48 (L) 10/04/2022 02:17 AM    BUN 11 10/04/2022 02:17 AM    Sodium 139 10/04/2022 02:17 AM    Potassium 3.9 10/04/2022 02:17 AM    Chloride 109 (H) 10/04/2022 02:17 AM    CO2 25 10/04/2022 02:17 AM    Magnesium 2.0 05/06/2016 01:32 PM     No results found for: PSA, Pj Fan, PSAR3, [id number], [id number]  Lab Results   Component Value Date/Time    TSH 1.030 08/02/2019 01:18 PM      Lab Results   Component Value Date/Time    Glucose 117 (H) 10/04/2022 02:17 AM    Glucose 104 (H) 01/24/2018 10:08 AM      No results found for: CPK, RCK1, RCK2, RCK3, RCK4, CKMB, CKNDX, CKND1, TROPT, TROIQ, BNPP, BNP   No results found for: BNP, BNPP, BNPPPOC, XBNPT, BNPNT   Lab Results   Component Value Date/Time    Sodium 139 10/04/2022 02:17 AM    Potassium 3.9 10/04/2022 02:17 AM    Chloride 109 (H) 10/04/2022 02:17 AM    CO2 25 10/04/2022 02:17 AM    Anion gap 5 10/04/2022 02:17 AM    Glucose 117 (H) 10/04/2022 02:17 AM    Glucose 104 (H) 01/24/2018 10:08 AM    BUN 11 10/04/2022 02:17 AM    Creatinine 0.48 (L) 10/04/2022 02:17 AM    BUN/Creatinine ratio 23 (H) 10/04/2022 02:17 AM    GFR est AA >60 10/03/2022 12:07 AM    GFR est non-AA >60 10/03/2022 12:07 AM    Calcium 9.4 10/04/2022 02:17 AM      Lab Results   Component Value Date/Time    Sodium 139 10/04/2022 02:17 AM    Potassium 3.9 10/04/2022 02:17 AM    Chloride 109 (H) 10/04/2022 02:17 AM    CO2 25 10/04/2022 02:17 AM    Anion gap 5 10/04/2022 02:17 AM    Glucose 117 (H) 10/04/2022 02:17 AM    Glucose 104 (H) 01/24/2018 10:08 AM    BUN 11 10/04/2022 02:17 AM    Creatinine 0.48 (L) 10/04/2022 02:17 AM    BUN/Creatinine ratio 23 (H) 10/04/2022 02:17 AM    GFR est AA >60 10/03/2022 12:07 AM    GFR est non-AA >60 10/03/2022 12:07 AM    Calcium 9.4 10/04/2022 02:17 AM    Bilirubin, total 0.6 09/28/2022 09:50 AM    ALT (SGPT) 43 09/28/2022 09:50 AM    Alk. phosphatase 63 09/28/2022 09:50 AM    Protein, total 6.5 09/28/2022 09:50 AM    Albumin 3.0 (L) 09/28/2022 09:50 AM    Globulin 3.5 09/28/2022 09:50 AM    A-G Ratio 0.9 (L) 09/28/2022 09:50 AM      No results found for: HBA1C, HXN2VGXO, DZB5WWDH, UDW7AJDP      No results for input(s): CPK, CKMB, TROIQ in the last 72 hours.     No lab exists for component: CKQMB, CPKMB        Problem List:     Problem List  Date Reviewed: 8/25/2022            Codes Class Noted    COVID-19 ICD-10-CM: U07.1  ICD-9-CM: 079.89  10/2/2022        Closed compression fracture of body of L1 vertebra ([facility] [address] ICD-10-CM: S32.010A  ICD-9-CM: 805.4  10/2/2022        Hypertensive urgency ICD-10-CM: I16.0  ICD-9-CM: 401.9  9/28/2022        Compression fracture of L1 vertebra ([facility] [address] ICD-10-CM: S32.010A  ICD-9-CM: 805.4  9/28/2022        COVID-19 virus infection ICD-10-CM: U07.1  ICD-9-CM: 079.89  9/20/2022        At moderate risk for fall ICD-10-CM: Z91.81  ICD-9-CM: V15.88  7/26/2022        Bilateral carpal tunnel syndrome ICD-10-CM: G56.03  ICD-9-CM: 354.0  6/15/2022        Trigger finger of right thumb ICD-10-CM: Y09.572  ICD-9-CM: 727.03  6/15/2022        Neuropathy ICD-10-CM: G62.9  ICD-9-CM: 355.9  1/22/2018        Absolute anemia ICD-10-CM: D64.9  ICD-9-CM: 285.9  4/4/2017        Tear film insufficiency ICD-10-CM: Y60.537  ICD-9-CM: 375.15 11/8/2016        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

## 2022-11-11 NOTE — PROGRESS NOTES
Chief Complaint   Patient presents with    New Patient    Hypertension       Visit Vitals  BP (!) 144/76   Pulse 96   Resp 20   Ht 5' 6\" (1.676 m)   Wt 149 lb (67.6 kg)   SpO2 100%   BMI 24.05 kg/m²

## 2022-12-22 ENCOUNTER — TELEPHONE (OUTPATIENT)
Dept: FAMILY MEDICINE CLINIC | Age: 87
End: 2022-12-22

## 2022-12-22 NOTE — TELEPHONE ENCOUNTER
Nadine Francois from home health called because she wanted to let Dr Ben Hayes know that the pt didn't want to be seen by anyone this week so they are moving the discharge to next week.     [phone]

## 2022-12-28 RX ORDER — AMLODIPINE BESYLATE 10 MG/1
TABLET ORAL
Qty: 30 TABLET | Refills: 0 | Status: SHIPPED | OUTPATIENT
Start: 2022-12-28

## 2023-01-27 ENCOUNTER — ANCILLARY PROCEDURE (OUTPATIENT)
Dept: CARDIOLOGY CLINIC | Age: 88
End: 2023-01-27
Payer: MEDICARE

## 2023-01-27 VITALS
BODY MASS INDEX: 23.95 KG/M2 | HEIGHT: 66 IN | SYSTOLIC BLOOD PRESSURE: 136 MMHG | DIASTOLIC BLOOD PRESSURE: 80 MMHG | WEIGHT: 149 LBS

## 2023-01-27 DIAGNOSIS — I16.0 HYPERTENSIVE URGENCY: ICD-10-CM

## 2023-01-27 DIAGNOSIS — R01.1 MURMUR: ICD-10-CM

## 2023-01-27 LAB
ECHO AO ASC DIAM: 3 CM
ECHO AO ASCENDING AORTA INDEX: 1.7 CM/M2
ECHO AO ROOT DIAM: 3.1 CM
ECHO AO ROOT INDEX: 1.76 CM/M2
ECHO AV AREA PEAK VELOCITY: 1.3 CM2
ECHO AV AREA VTI: 1.9 CM2
ECHO AV AREA/BSA PEAK VELOCITY: 0.7 CM2/M2
ECHO AV AREA/BSA VTI: 1.1 CM2/M2
ECHO AV MEAN GRADIENT: 15 MMHG
ECHO AV MEAN VELOCITY: 1.8 M/S
ECHO AV PEAK GRADIENT: 47 MMHG
ECHO AV PEAK VELOCITY: 3.4 M/S
ECHO AV VELOCITY RATIO: 0.5
ECHO AV VTI: 49.7 CM
ECHO EST RA PRESSURE: 3 MMHG
ECHO LA DIAMETER INDEX: 2.27 CM/M2
ECHO LA DIAMETER: 4 CM
ECHO LA TO AORTIC ROOT RATIO: 1.29
ECHO LA VOL 2C: 65 ML (ref 22–52)
ECHO LA VOL 4C: 51 ML (ref 22–52)
ECHO LA VOL BP: 59 ML (ref 22–52)
ECHO LA VOL/BSA BIPLANE: 34 ML/M2 (ref 16–34)
ECHO LA VOLUME AREA LENGTH: 63 ML
ECHO LA VOLUME INDEX A2C: 37 ML/M2 (ref 16–34)
ECHO LA VOLUME INDEX A4C: 29 ML/M2 (ref 16–34)
ECHO LA VOLUME INDEX AREA LENGTH: 36 ML/M2 (ref 16–34)
ECHO LV E' LATERAL VELOCITY: 4 CM/S
ECHO LV E' SEPTAL VELOCITY: 4 CM/S
ECHO LV EDV A2C: 64 ML
ECHO LV EDV A4C: 64 ML
ECHO LV EDV BP: 65 ML (ref 56–104)
ECHO LV EDV INDEX A4C: 36 ML/M2
ECHO LV EDV INDEX BP: 37 ML/M2
ECHO LV EDV NDEX A2C: 36 ML/M2
ECHO LV EJECTION FRACTION A2C: 59 %
ECHO LV EJECTION FRACTION A4C: 59 %
ECHO LV EJECTION FRACTION BIPLANE: 60 % (ref 55–100)
ECHO LV ESV A2C: 26 ML
ECHO LV ESV A4C: 26 ML
ECHO LV ESV BP: 26 ML (ref 19–49)
ECHO LV ESV INDEX A2C: 15 ML/M2
ECHO LV ESV INDEX A4C: 15 ML/M2
ECHO LV ESV INDEX BP: 15 ML/M2
ECHO LV FRACTIONAL SHORTENING: 29 % (ref 28–44)
ECHO LV INTERNAL DIMENSION DIASTOLE INDEX: 1.76 CM/M2
ECHO LV INTERNAL DIMENSION DIASTOLIC: 3.1 CM (ref 3.9–5.3)
ECHO LV INTERNAL DIMENSION SYSTOLIC INDEX: 1.25 CM/M2
ECHO LV INTERNAL DIMENSION SYSTOLIC: 2.2 CM
ECHO LV IVSD: 1.5 CM (ref 0.6–0.9)
ECHO LV MASS 2D: 146.7 G (ref 67–162)
ECHO LV MASS INDEX 2D: 83.3 G/M2 (ref 43–95)
ECHO LV POSTERIOR WALL DIASTOLIC: 1.3 CM (ref 0.6–0.9)
ECHO LV RELATIVE WALL THICKNESS RATIO: 0.84
ECHO LVOT AREA: 2.8 CM2
ECHO LVOT AV VTI INDEX: 0.68
ECHO LVOT DIAM: 1.9 CM
ECHO LVOT MEAN GRADIENT: 7 MMHG
ECHO LVOT PEAK GRADIENT: 11 MMHG
ECHO LVOT PEAK VELOCITY: 1.7 M/S
ECHO LVOT STROKE VOLUME INDEX: 54.7 ML/M2
ECHO LVOT SV: 96.4 ML
ECHO LVOT VTI: 34 CM
ECHO MV A VELOCITY: 2.07 M/S
ECHO MV AREA PHT: 1.6 CM2
ECHO MV AREA VTI: 2 CM2
ECHO MV E DECELERATION TIME (DT): 487 MS
ECHO MV E VELOCITY: 1.33 M/S
ECHO MV E/A RATIO: 0.64
ECHO MV E/E' LATERAL: 33.25
ECHO MV E/E' RATIO (AVERAGED): 33.25
ECHO MV E/E' SEPTAL: 33.25
ECHO MV LVOT VTI INDEX: 1.44
ECHO MV MAX VELOCITY: 2.4 M/S
ECHO MV MEAN GRADIENT: 8 MMHG
ECHO MV MEAN VELOCITY: 1.3 M/S
ECHO MV PEAK GRADIENT: 22 MMHG
ECHO MV PRESSURE HALF TIME (PHT): 141.2 MS
ECHO MV VTI: 49 CM
ECHO RIGHT VENTRICULAR SYSTOLIC PRESSURE (RVSP): 39 MMHG
ECHO RV FREE WALL PEAK S': 12 CM/S
ECHO RV INTERNAL DIMENSION: 3.8 CM
ECHO RV TAPSE: 2.6 CM (ref 1.7–?)
ECHO TV REGURGITANT MAX VELOCITY: 2.98 M/S
ECHO TV REGURGITANT PEAK GRADIENT: 36 MMHG

## 2023-01-27 PROCEDURE — 93306 TTE W/DOPPLER COMPLETE: CPT | Performed by: INTERNAL MEDICINE

## 2023-01-30 RX ORDER — AMLODIPINE BESYLATE 10 MG/1
TABLET ORAL
Qty: 30 TABLET | Refills: 0 | OUTPATIENT
Start: 2023-01-30

## 2023-02-01 PROCEDURE — 93306 TTE W/DOPPLER COMPLETE: CPT | Performed by: INTERNAL MEDICINE

## 2023-02-05 RX ORDER — AMLODIPINE BESYLATE 10 MG/1
TABLET ORAL
Qty: 30 TABLET | Refills: 0 | Status: SHIPPED | OUTPATIENT
Start: 2023-02-05

## 2023-02-07 ENCOUNTER — TELEPHONE (OUTPATIENT)
Dept: CARDIOLOGY CLINIC | Age: 88
End: 2023-02-07

## 2023-02-07 RX ORDER — FUROSEMIDE 20 MG/1
20 TABLET ORAL DAILY
Qty: 90 TABLET | Refills: 3 | Status: SHIPPED | OUTPATIENT
Start: 2023-02-07

## 2023-02-07 NOTE — TELEPHONE ENCOUNTER
All orders entered per verbal orders of Dr. Tyra Maya    Moderate LVH   Mod MS   Mild PHTN     Start Lasix 20mg po daily. Spoke with the pt's daughter,Yadira, identified the pt with name and . Informed her of the Echo result and Dr Jerzy Frost Recommendations. I verified the Pharmacy. Yadira expressed understanding and agreement. Pt has no further questions or concerns at this time. Refill per VO of Dr. Blount Marcia:    Last appt: 2022    Future Appointments   Date Time Provider Josh Millard   2023  4:20 PM Christine Clay MD [facility] BS AMB       Requested Prescriptions     Pending Prescriptions Disp Refills    furosemide (LASIX) 20 mg tablet 90 Tablet 3     Sig: Take 1 Tablet by mouth daily.

## 2023-02-08 ENCOUNTER — TELEPHONE (OUTPATIENT)
Dept: FAMILY MEDICINE CLINIC | Age: 88
End: 2023-02-08

## 2023-02-08 DIAGNOSIS — I10 PRIMARY HYPERTENSION: Primary | ICD-10-CM

## 2023-02-08 RX ORDER — AMLODIPINE BESYLATE 10 MG/1
TABLET ORAL
Qty: 90 TABLET | Refills: 0 | Status: SHIPPED | OUTPATIENT
Start: 2023-02-08

## 2023-02-08 NOTE — TELEPHONE ENCOUNTER
Patient is having issues getting her blood pressure medication amlodipine which she was prescribed while she was in [place] from Dr Tyree Carrion. Her daughter was wondering if you could take over prescribing this medication and give a 90 day supply.     Please call daughter Marlin Ridley [phone]

## 2023-02-21 ENCOUNTER — OFFICE VISIT (OUTPATIENT)
Dept: FAMILY MEDICINE CLINIC | Age: 88
End: 2023-02-21
Payer: MEDICARE

## 2023-02-21 VITALS
OXYGEN SATURATION: 97 % | RESPIRATION RATE: 16 BRPM | DIASTOLIC BLOOD PRESSURE: 72 MMHG | BODY MASS INDEX: 23.3 KG/M2 | HEART RATE: 103 BPM | TEMPERATURE: 98.5 F | HEIGHT: 66 IN | WEIGHT: 145 LBS | SYSTOLIC BLOOD PRESSURE: 138 MMHG

## 2023-02-21 DIAGNOSIS — I10 PRIMARY HYPERTENSION: Primary | ICD-10-CM

## 2023-02-21 PROCEDURE — G0463 HOSPITAL OUTPT CLINIC VISIT: HCPCS | Performed by: INTERNAL MEDICINE

## 2023-02-21 PROCEDURE — 99213 OFFICE O/P EST LOW 20 MIN: CPT | Performed by: INTERNAL MEDICINE

## 2023-02-21 PROCEDURE — 1101F PT FALLS ASSESS-DOCD LE1/YR: CPT | Performed by: INTERNAL MEDICINE

## 2023-02-21 PROCEDURE — G8510 SCR DEP NEG, NO PLAN REQD: HCPCS | Performed by: INTERNAL MEDICINE

## 2023-02-21 PROCEDURE — G8427 DOCREV CUR MEDS BY ELIG CLIN: HCPCS | Performed by: INTERNAL MEDICINE

## 2023-02-21 PROCEDURE — G8420 CALC BMI NORM PARAMETERS: HCPCS | Performed by: INTERNAL MEDICINE

## 2023-02-21 PROCEDURE — G8536 NO DOC ELDER MAL SCRN: HCPCS | Performed by: INTERNAL MEDICINE

## 2023-02-21 PROCEDURE — 1123F ACP DISCUSS/DSCN MKR DOCD: CPT | Performed by: INTERNAL MEDICINE

## 2023-02-21 PROCEDURE — 1090F PRES/ABSN URINE INCON ASSESS: CPT | Performed by: INTERNAL MEDICINE

## 2023-02-21 RX ORDER — IRON ASPGLY/C/B12/FA/CA-TH/SUC 70-150-1MG
TABLET ORAL
Qty: 90 TABLET | Refills: 1 | Status: SHIPPED | OUTPATIENT
Start: 2023-02-21

## 2023-02-21 NOTE — PROGRESS NOTES
Identified pt with two pt identifiers(name and ). Chief Complaint   Patient presents with    Blood Pressure Check    Medication Evaluation     Patient has a question about her lasix     Other     Handicap parking form         Health Maintenance Due   Topic    DTaP/Tdap/Td series (1 - Tdap)    Shingles Vaccine (1 of 2)    Flu Vaccine (1)       Wt Readings from Last 3 Encounters:   23 145 lb (65.8 kg)   23 149 lb (67.6 kg)   22 149 lb (67.6 kg)     Temp Readings from Last 3 Encounters:   23 98.5 °F (36.9 °C) (Temporal)   10/04/22 98.5 °F (36.9 °C)   22 98.3 °F (36.8 °C)     BP Readings from Last 3 Encounters:   23 (!) 142/60   23 136/80   22 (!) 144/76     Pulse Readings from Last 3 Encounters:   23 (!) 103   22 96   10/04/22 95         Learning Assessment:  :     Learning Assessment 2016   PRIMARY LEARNER Patient Patient   HIGHEST LEVEL OF EDUCATION - PRIMARY LEARNER  - GRADUATED HIGH SCHOOL OR GED   BARRIERS PRIMARY LEARNER - NONE   CO-LEARNER CAREGIVER - No   PRIMARY LANGUAGE ENGLISH ENGLISH   LEARNER PREFERENCE PRIMARY DEMONSTRATION DEMONSTRATION     - LISTENING     - READING   ANSWERED BY patient  patient   RELATIONSHIP SELF SELF       Depression Screening:  :     3 most recent PHQ Screens 2023   Little interest or pleasure in doing things Not at all   Feeling down, depressed, irritable, or hopeless Not at all   Total Score PHQ 2 0       Fall Risk Assessment:  :     Fall Risk Assessment, last 12 mths 2022   Able to walk? Yes   Fall in past 12 months? 0   Do you feel unsteady? 0   Are you worried about falling 0   Is the gait abnormal? -   Number of falls in past 12 months -       Abuse Screening:  :     Abuse Screening Questionnaire 2023   Do you ever feel afraid of your partner? N N N N N   Are you in a relationship with someone who physically or mentally threatens you?  N N N N N Is it safe for you to go home? Y Y Y Y Y       Coordination of Care Questionnaire:  :     1) Have you been to an emergency room, urgent care clinic since your last visit? no   Hospitalized since your last visit? no             2) Have you seen or consulted any other health care providers outside of [address] [place] [address] since your last visit? no  (Include any pap smears or colon screenings in this section.)    3) Do you have an Advance Directive on file? no  Are you interested in receiving information about Advance Directives? no    Patient is accompanied by daughter I have received verbal consent from Prieto Chappell to discuss any/all medical information while they are present in the room. 4.  For patients aged 39-70: Has the patient had a colonoscopy / FIT/ Cologuard? NA - based on age      If the patient is female:    11. For patients aged 41-77: Has the patient had a mammogram within the past 2 years? NA - based on age or sex      10. For patients aged 21-65: Has the patient had a pap smear?  NA - based on age or sex

## 2023-02-26 NOTE — PROGRESS NOTES
CC:  Chief Complaint   Patient presents with    Blood Pressure Check    Medication Evaluation     Patient has a question about her lasix     Other     Handicap parking form        Assessment/Plan:   Diagnoses and all orders for this visit:    1. Primary hypertension   Well controlled in the office today. Mild tachycardia, but no symptoms. -     iron [id number] (Multigen Folic) tab capsule; TAKE 1 TABLET BY MOUTH EVERY DAY    I have discussed the diagnosis with the patient and the intended treatment plan as seen in the above orders. The patient has received an after-visit summary and questions were answered concerning future plans. Asked to return should symptoms worsen or not improve with treatment. Any pending labs and studies will be relayed to patient when they become available. Pt verbalizes understanding of plan of care and denies further questions or concerns at this time. Follow-up and Dispositions    Return if symptoms worsen or fail to improve, for Follow up 6 months for chronic issues. Subjective:     Gretchen Nuñez is a 80 y.o. female who presents for follow up of hypertension. She is tolerating Losartan and Amlodipine without any worrisome side effects. Diet and Lifestyle: generally follows a low fat low cholesterol diet, generally follows a low sodium diet, exercises sporadically, sedentary, nonsmoker  Home BP Monitoring: is borderline controlled at home, ranging 120-130's/70-90's    Cardiovascular ROS: taking medications as instructed, no medication side effects noted, no TIA's, no chest pain on exertion, no dyspnea on exertion, no swelling of ankles. New concerns: none. HISTORICAL  PMH, PSH, FHX, SOCHX, ALLERGIES and MEDICATIONS were reviewed and updated today. Review of Systems, additional:  Pertinent items are noted in HPI.     Objective:     Visit Vitals  /72 (BP 1 Location: Left upper arm, BP Patient Position: Sitting)   Pulse (!) 103   Temp 98.5 °F (36.9 °C) (Temporal)   Resp 16   Ht 5' 6\" (1.676 m)   Wt 145 lb (65.8 kg)   SpO2 97%   BMI 23.40 kg/m²     Appearance: alert, well appearing, and in no distress and normal appearing weight. General exam: CVS exam BP noted to be well controlled today in office, S1, S2 normal, no gallop, no murmur, chest clear, no JVD, no HSM, no edema, peripheral vascular exam both carotids normal upstroke without bruits, neurological exam alert, oriented, normal speech, no focal findings or movement disorder noted. Lab review: no lab studies available for review at time of visit.      Shelbi Gonzalez MD  [facility]  02/21/2023

## 2023-05-03 ENCOUNTER — OFFICE VISIT (OUTPATIENT)
Dept: FAMILY MEDICINE CLINIC | Age: 88
End: 2023-05-03
Payer: MEDICARE

## 2023-05-03 VITALS
HEIGHT: 66 IN | OXYGEN SATURATION: 95 % | HEART RATE: 110 BPM | DIASTOLIC BLOOD PRESSURE: 70 MMHG | BODY MASS INDEX: 23.46 KG/M2 | TEMPERATURE: 98.6 F | RESPIRATION RATE: 18 BRPM | WEIGHT: 146 LBS | SYSTOLIC BLOOD PRESSURE: 135 MMHG

## 2023-05-03 DIAGNOSIS — M53.3 SI (SACROILIAC) PAIN: Primary | ICD-10-CM

## 2023-05-03 DIAGNOSIS — Z98.890 H/O KYPHOPLASTY: ICD-10-CM

## 2023-05-03 PROCEDURE — G0463 HOSPITAL OUTPT CLINIC VISIT: HCPCS | Performed by: FAMILY MEDICINE

## 2023-05-03 PROCEDURE — G8432 DEP SCR NOT DOC, RNG: HCPCS | Performed by: FAMILY MEDICINE

## 2023-05-03 PROCEDURE — G8536 NO DOC ELDER MAL SCRN: HCPCS | Performed by: FAMILY MEDICINE

## 2023-05-03 PROCEDURE — G8427 DOCREV CUR MEDS BY ELIG CLIN: HCPCS | Performed by: FAMILY MEDICINE

## 2023-05-03 PROCEDURE — 1090F PRES/ABSN URINE INCON ASSESS: CPT | Performed by: FAMILY MEDICINE

## 2023-05-03 PROCEDURE — 99212 OFFICE O/P EST SF 10 MIN: CPT | Performed by: FAMILY MEDICINE

## 2023-05-03 PROCEDURE — 1101F PT FALLS ASSESS-DOCD LE1/YR: CPT | Performed by: FAMILY MEDICINE

## 2023-05-03 PROCEDURE — 1123F ACP DISCUSS/DSCN MKR DOCD: CPT | Performed by: FAMILY MEDICINE

## 2023-05-03 PROCEDURE — G8420 CALC BMI NORM PARAMETERS: HCPCS | Performed by: FAMILY MEDICINE

## 2023-05-08 NOTE — TELEPHONE ENCOUNTER
----- Message from Woodrow Merchant MD sent at 7/20/2018 12:42 PM EDT -----  Patient found to have osteopenia on bone density. OSTEOPENIA RECOMMENDATIONS:     - Vitamin D = 5787-0188 IU/day   - Calcium = 600 mg/day   - Gentle weight bearing exercises   - Bone density screening every 2-years. A pharmacist should be able to show you the best combination of these drugs. We will discuss this more after your follow up visit. Island Pedicle Flap Text: The defect edges were debeveled with a #15 scalpel blade.  Given the location of the defect, shape of the defect and the proximity to free margins an island pedicle advancement flap was deemed most appropriate.  Using a sterile surgical marker, an appropriate advancement flap was drawn incorporating the defect, outlining the appropriate donor tissue and placing the expected incisions within the relaxed skin tension lines where possible.    The area thus outlined was incised deep to adipose tissue with a #15 scalpel blade.  The skin margins were undermined to an appropriate distance in all directions around the primary defect and laterally outward around the island pedicle utilizing iris scissors.  There was minimal undermining beneath the pedicle flap.

## 2023-05-30 ENCOUNTER — APPOINTMENT (OUTPATIENT)
Facility: HOSPITAL | Age: 88
End: 2023-05-30
Payer: MEDICARE

## 2023-05-30 ENCOUNTER — HOSPITAL ENCOUNTER (EMERGENCY)
Facility: HOSPITAL | Age: 88
Discharge: HOME | End: 2023-05-30
Attending: EMERGENCY MEDICINE
Payer: MEDICARE

## 2023-05-30 VITALS
BODY MASS INDEX: 23.46 KG/M2 | OXYGEN SATURATION: 96 % | TEMPERATURE: 98.9 F | WEIGHT: 146 LBS | HEART RATE: 92 BPM | DIASTOLIC BLOOD PRESSURE: 49 MMHG | HEIGHT: 66 IN | SYSTOLIC BLOOD PRESSURE: 148 MMHG | RESPIRATION RATE: 16 BRPM
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
## 2023-05-30 PROCEDURE — 72190 X-RAY EXAM OF PELVIS: CPT

## 2023-05-30 PROCEDURE — 99283 EMERGENCY DEPT VISIT LOW MDM: CPT

## 2023-05-30 PROCEDURE — 72100 X-RAY EXAM L-S SPINE 2/3 VWS: CPT

## 2023-05-30 PROCEDURE — 72220 X-RAY EXAM SACRUM TAILBONE: CPT

## 2023-05-30 RX ORDER — MULTIVIT-MIN/IRON/FOLIC ACID/K 18-600-40
CAPSULE ORAL
COMMUNITY

## 2023-05-30 RX ORDER — M-VIT,TX,IRON,MINS/CALC/FOLIC 27MG-0.4MG
1 TABLET ORAL DAILY
COMMUNITY

## 2023-05-30 RX ORDER — LOSARTAN POTASSIUM 100 MG/1
TABLET ORAL
Qty: 90 TABLET | Refills: 0 | Status: SHIPPED | OUTPATIENT
Start: 2023-05-30

## 2023-05-30 ASSESSMENT — LIFESTYLE VARIABLES
HOW MANY STANDARD DRINKS CONTAINING ALCOHOL DO YOU HAVE ON A TYPICAL DAY: 1 OR 2
HOW OFTEN DO YOU HAVE A DRINK CONTAINING ALCOHOL: MONTHLY OR LESS

## 2023-05-30 ASSESSMENT — PAIN SCALES - GENERAL: PAINLEVEL_OUTOF10: 0

## 2023-05-30 NOTE — ED TRIAGE NOTES
Pt ambulatory with walker, reports while standing and putting on her jacket she fell and landed on her butt, denies loc or head injury, reports mild pain in buttock.

## 2023-05-30 NOTE — ED NOTES
Pt was discharged and given instructions by Dr Rudy Cobb. Pt verbalized good understanding of all discharge instructions and F/U care. All questions answered. Pt in stable condition on discharge. PT Ambulatory with walker left with family member.         Mary Anne Deleon RN  05/30/23 [id number]

## 2023-05-30 NOTE — ED NOTES
Pt. Seated on stretcher, family at bedside, blankets provided for comfort, no complaints or concerns at this time.       Adele Farley RN  05/30/23 4898

## 2023-05-30 NOTE — ED NOTES
Pt to restroom with walker and returned to stretcher, family at bedside.       Martin Garnica RN  05/30/23 3509

## 2023-05-30 NOTE — ED PROVIDER NOTES
[facility] EMERGENCY DEPT  EMERGENCY DEPARTMENT ENCOUNTER      Pt Name: Dana Carlson  MRN: [id number]  [place] 3/30/1931  Date of evaluation: 5/30/2023  Provider: Paul Narayan, [facility]       Chief Complaint   Patient presents with    Fall         HISTORY OF PRESENT ILLNESS   (Location/Symptom, Timing/Onset, Context/Setting, Quality, Duration, Modifying Factors, Severity)  Note limiting factors. 70-year-old female who slipped putting on her jacket. Courtney Michaels on her backside. No head strike. Patient complained of right gluteal discomfort at the time of fall. Currently no complaints of any pain. Concern for lumbar fracture given previous episode of the same where she was found to have lumbar fracture several days later. Requesting lumbar imaging. No other complaints. Review of External Medical Records:     Nursing Notes were reviewed. REVIEW OF SYSTEMS    (2-9 systems for level 4, 10 or more for level 5)     Review of Systems    Except as noted above the remainder of the review of systems was reviewed and negative.        PAST MEDICAL HISTORY     Past Medical History:   Diagnosis Date    Anemia     Sees Dr. Liliane Nance and is on a prescription multivitamin call Multigen which has corrected her anemia    Headache     Hypertension     Intracranial hypotension 1998    had blood patch to correct    Meningitis 1950 & 2011    Osteochondritis 1975    Snoring          SURGICAL HISTORY       Past Surgical History:   Procedure Laterality Date    CATARACT REMOVAL  1997    DILATION AND CURETTAGE OF UTERUS  1972    IR KYPHOPLASTY LUMBAR FIRST LEVEL  9/30/2022    IR KYPHOPLASTY LUMBAR FIRST LEVEL 9/30/2022 [facility] IR    IR KYPHOPLASTY LUMBAR FIRST LEVEL  9/30/2022    OTHER SURGICAL HISTORY  1998    blood patch for intercranial spontaneious hypotension     OTHER SURGICAL HISTORY  1975    osteochroditis         CURRENT MEDICATIONS       Discharge Medication List as of 5/30/2023  3:15 PM

## 2023-08-06 RX ORDER — LOSARTAN POTASSIUM 100 MG/1
TABLET ORAL
Qty: 90 TABLET | Refills: 0 | Status: SHIPPED | OUTPATIENT
Start: 2023-08-06

## 2023-11-08 ENCOUNTER — OFFICE VISIT (OUTPATIENT)
Age: 88
End: 2023-11-08
Payer: MEDICARE

## 2023-11-08 VITALS
DIASTOLIC BLOOD PRESSURE: 54 MMHG | HEART RATE: 82 BPM | BODY MASS INDEX: 23.91 KG/M2 | HEIGHT: 66 IN | TEMPERATURE: 98.3 F | OXYGEN SATURATION: 99 % | SYSTOLIC BLOOD PRESSURE: 138 MMHG | WEIGHT: 148.8 LBS | RESPIRATION RATE: 16 BRPM

## 2023-11-08 DIAGNOSIS — I10 PRIMARY HYPERTENSION: ICD-10-CM

## 2023-11-08 DIAGNOSIS — E78.2 MIXED HYPERLIPIDEMIA: ICD-10-CM

## 2023-11-08 DIAGNOSIS — D50.9 IRON DEFICIENCY ANEMIA, UNSPECIFIED IRON DEFICIENCY ANEMIA TYPE: ICD-10-CM

## 2023-11-08 DIAGNOSIS — Z00.00 MEDICARE ANNUAL WELLNESS VISIT, SUBSEQUENT: Primary | ICD-10-CM

## 2023-11-08 DIAGNOSIS — E55.9 VITAMIN D DEFICIENCY, UNSPECIFIED: ICD-10-CM

## 2023-11-08 PROBLEM — R60.9 PERIPHERAL EDEMA: Status: ACTIVE | Noted: 2023-05-31

## 2023-11-08 PROBLEM — R60.0 PERIPHERAL EDEMA: Status: ACTIVE | Noted: 2023-05-31

## 2023-11-08 PROCEDURE — 1090F PRES/ABSN URINE INCON ASSESS: CPT | Performed by: INTERNAL MEDICINE

## 2023-11-08 PROCEDURE — 99214 OFFICE O/P EST MOD 30 MIN: CPT | Performed by: INTERNAL MEDICINE

## 2023-11-08 PROCEDURE — G8484 FLU IMMUNIZE NO ADMIN: HCPCS | Performed by: INTERNAL MEDICINE

## 2023-11-08 PROCEDURE — 1036F TOBACCO NON-USER: CPT | Performed by: INTERNAL MEDICINE

## 2023-11-08 PROCEDURE — G8420 CALC BMI NORM PARAMETERS: HCPCS | Performed by: INTERNAL MEDICINE

## 2023-11-08 PROCEDURE — G0439 PPPS, SUBSEQ VISIT: HCPCS | Performed by: INTERNAL MEDICINE

## 2023-11-08 PROCEDURE — 1123F ACP DISCUSS/DSCN MKR DOCD: CPT | Performed by: INTERNAL MEDICINE

## 2023-11-08 PROCEDURE — G8427 DOCREV CUR MEDS BY ELIG CLIN: HCPCS | Performed by: INTERNAL MEDICINE

## 2023-11-08 RX ORDER — IRON ASPGLY/C/B12/FA/CA-TH/SUC 70-150-1MG
1 TABLET ORAL DAILY
Qty: 90 TABLET | Refills: 1 | Status: SHIPPED | OUTPATIENT
Start: 2023-11-08

## 2023-11-08 RX ORDER — IRON ASPGLY/C/B12/FA/CA-TH/SUC 70-150-1MG
1 TABLET ORAL DAILY
COMMUNITY
End: 2023-11-08 | Stop reason: SDUPTHER

## 2023-11-08 RX ORDER — AMLODIPINE BESYLATE 10 MG/1
TABLET ORAL
Qty: 90 TABLET | Refills: 1 | Status: SHIPPED | OUTPATIENT
Start: 2023-11-08

## 2023-11-08 SDOH — ECONOMIC STABILITY: FOOD INSECURITY: WITHIN THE PAST 12 MONTHS, YOU WORRIED THAT YOUR FOOD WOULD RUN OUT BEFORE YOU GOT MONEY TO BUY MORE.: NEVER TRUE

## 2023-11-08 SDOH — ECONOMIC STABILITY: HOUSING INSECURITY
IN THE LAST 12 MONTHS, WAS THERE A TIME WHEN YOU DID NOT HAVE A STEADY PLACE TO SLEEP OR SLEPT IN A SHELTER (INCLUDING NOW)?: NO

## 2023-11-08 SDOH — ECONOMIC STABILITY: INCOME INSECURITY: HOW HARD IS IT FOR YOU TO PAY FOR THE VERY BASICS LIKE FOOD, HOUSING, MEDICAL CARE, AND HEATING?: NOT HARD AT ALL

## 2023-11-08 SDOH — ECONOMIC STABILITY: FOOD INSECURITY: WITHIN THE PAST 12 MONTHS, THE FOOD YOU BOUGHT JUST DIDN'T LAST AND YOU DIDN'T HAVE MONEY TO GET MORE.: NEVER TRUE

## 2023-11-08 ASSESSMENT — PATIENT HEALTH QUESTIONNAIRE - PHQ9
SUM OF ALL RESPONSES TO PHQ QUESTIONS 1-9: 0
2. FEELING DOWN, DEPRESSED OR HOPELESS: 0
1. LITTLE INTEREST OR PLEASURE IN DOING THINGS: 0
SUM OF ALL RESPONSES TO PHQ QUESTIONS 1-9: 0
SUM OF ALL RESPONSES TO PHQ9 QUESTIONS 1 & 2: 0

## 2023-11-08 ASSESSMENT — LIFESTYLE VARIABLES
HOW OFTEN DO YOU HAVE A DRINK CONTAINING ALCOHOL: NEVER
HOW MANY STANDARD DRINKS CONTAINING ALCOHOL DO YOU HAVE ON A TYPICAL DAY: PATIENT DOES NOT DRINK

## 2023-11-18 RX ORDER — LOSARTAN POTASSIUM 100 MG/1
TABLET ORAL
Qty: 90 TABLET | Refills: 0 | Status: SHIPPED | OUTPATIENT
Start: 2023-11-18

## 2023-12-01 ENCOUNTER — NURSE ONLY (OUTPATIENT)
Age: 88
End: 2023-12-01

## 2023-12-01 DIAGNOSIS — E55.9 VITAMIN D DEFICIENCY, UNSPECIFIED: ICD-10-CM

## 2023-12-01 DIAGNOSIS — I10 PRIMARY HYPERTENSION: ICD-10-CM

## 2023-12-01 DIAGNOSIS — E78.2 MIXED HYPERLIPIDEMIA: ICD-10-CM

## 2023-12-01 LAB
ALBUMIN SERPL-MCNC: 3.6 G/DL (ref 3.5–5)
ALBUMIN/GLOB SERPL: 1.1 (ref 1.1–2.2)
ALP SERPL-CCNC: 78 U/L (ref 45–117)
ALT SERPL-CCNC: 21 U/L (ref 12–78)
ANION GAP SERPL CALC-SCNC: 5 MMOL/L (ref 5–15)
AST SERPL-CCNC: 27 U/L (ref 15–37)
BASOPHILS # BLD: 0 K/UL (ref 0–0.1)
BASOPHILS NFR BLD: 1 % (ref 0–1)
BILIRUB SERPL-MCNC: 0.5 MG/DL (ref 0.2–1)
BUN SERPL-MCNC: 12 MG/DL (ref 6–20)
BUN/CREAT SERPL: 18 (ref 12–20)
CALCIUM SERPL-MCNC: 10 MG/DL (ref 8.5–10.1)
CHLORIDE SERPL-SCNC: 106 MMOL/L (ref 97–108)
CHOLEST SERPL-MCNC: 214 MG/DL
CO2 SERPL-SCNC: 30 MMOL/L (ref 21–32)
CREAT SERPL-MCNC: 0.68 MG/DL (ref 0.55–1.02)
DIFFERENTIAL METHOD BLD: ABNORMAL
EOSINOPHIL # BLD: 0 K/UL (ref 0–0.4)
EOSINOPHIL NFR BLD: 0 % (ref 0–7)
ERYTHROCYTE [DISTWIDTH] IN BLOOD BY AUTOMATED COUNT: 13.5 % (ref 11.5–14.5)
GLOBULIN SER CALC-MCNC: 3.2 G/DL (ref 2–4)
GLUCOSE SERPL-MCNC: 113 MG/DL (ref 65–100)
HCT VFR BLD AUTO: 39.3 % (ref 35–47)
HDLC SERPL-MCNC: 58 MG/DL
HDLC SERPL: 3.7 (ref 0–5)
HGB BLD-MCNC: 12.6 G/DL (ref 11.5–16)
IMM GRANULOCYTES # BLD AUTO: 0.1 K/UL (ref 0–0.04)
IMM GRANULOCYTES NFR BLD AUTO: 2 % (ref 0–0.5)
LDLC SERPL CALC-MCNC: 132.4 MG/DL (ref 0–100)
LYMPHOCYTES # BLD: 2.7 K/UL (ref 0.8–3.5)
LYMPHOCYTES NFR BLD: 65 % (ref 12–49)
MCH RBC QN AUTO: 28.4 PG (ref 26–34)
MCHC RBC AUTO-ENTMCNC: 32.1 G/DL (ref 30–36.5)
MCV RBC AUTO: 88.7 FL (ref 80–99)
MONOCYTES # BLD: 0.3 K/UL (ref 0–1)
MONOCYTES NFR BLD: 8 % (ref 5–13)
NEUTS SEG # BLD: 1 K/UL (ref 1.8–8)
NEUTS SEG NFR BLD: 24 % (ref 32–75)
NRBC # BLD: 0 K/UL (ref 0–0.01)
NRBC BLD-RTO: 0 PER 100 WBC
PLATELET # BLD AUTO: 190 K/UL (ref 150–400)
PMV BLD AUTO: 9 FL (ref 8.9–12.9)
POTASSIUM SERPL-SCNC: 4.7 MMOL/L (ref 3.5–5.1)
PROT SERPL-MCNC: 6.8 G/DL (ref 6.4–8.2)
RBC # BLD AUTO: 4.43 M/UL (ref 3.8–5.2)
RBC MORPH BLD: ABNORMAL
SODIUM SERPL-SCNC: 141 MMOL/L (ref 136–145)
TRIGL SERPL-MCNC: 118 MG/DL
VLDLC SERPL CALC-MCNC: 23.6 MG/DL
WBC # BLD AUTO: 4.1 K/UL (ref 3.6–11)

## 2023-12-02 LAB — 25(OH)D3 SERPL-MCNC: 41.3 NG/ML (ref 30–100)

## 2023-12-04 DIAGNOSIS — D50.9 IRON DEFICIENCY ANEMIA, UNSPECIFIED IRON DEFICIENCY ANEMIA TYPE: Primary | ICD-10-CM

## 2023-12-04 DIAGNOSIS — R79.89 ABNORMAL CBC: Primary | ICD-10-CM

## 2023-12-04 DIAGNOSIS — R79.89 ABNORMAL CBC: ICD-10-CM

## 2023-12-05 ENCOUNTER — TELEPHONE (OUTPATIENT)
Age: 88
End: 2023-12-05

## 2023-12-05 NOTE — TELEPHONE ENCOUNTER
----- Message from Dori Mijares MD sent at 12/4/2023  7:31 AM EST -----  Please let patient know that her labs are stable. HOWEVER, her CBC-D was abnormal showing low neutrophils and elevated Lymphocytes. This could be just an acute finding, but it needs to be rechecked. I have placed an order for a repeat CBC with a path review. This should be repeated in 1-2 weeks. Thanks!

## 2023-12-06 ENCOUNTER — TELEPHONE (OUTPATIENT)
Age: 88
End: 2023-12-06

## 2023-12-06 NOTE — TELEPHONE ENCOUNTER
----- Message from Jose Reveles sent at 12/6/2023  9:43 AM EST -----  Subject: Appointment Request    Reason for Call: Established Patient Appointment needed: Semi-Routine Eye   Problem    QUESTIONS    Reason for appointment request? No appointments available during search     Additional Information for Provider? Patient is experiencing redness in   her eyes. Patient was seen at [facility] a month ago for eye redness   and drainage and was treated and symptoms improved but now symptoms have   retuned. Patients daughter is looking to be seen either tomorrow or the   next day if possible.  Please advise.   ---------------------------------------------------------------------------  --------------  Ursula GARZA  [phone]; OK to leave message on voicemail  ---------------------------------------------------------------------------  --------------  SCRIPT ANSWERS

## 2023-12-06 NOTE — TELEPHONE ENCOUNTER
RELAYED DR Marlaine Cushing MESSAGE TO DAUGHTER AND SCHEDULED MS HUGHES FOR A LAB REDRAW ON 12/22/23

## 2023-12-27 ENCOUNTER — TELEPHONE (OUTPATIENT)
Age: 88
End: 2023-12-27

## 2023-12-27 NOTE — TELEPHONE ENCOUNTER
----- Message from Rubia Galindo MD sent at 12/27/2023  6:05 AM EST -----  Please let patient and daughter know that her peripheral smear did show some abnormalities with the her white count. I am still awaiting a final study with flow cytometry (where they further evaluation the nature of the problem). Neutrophil count was back to normal and Lymphocytes are almost back to normal as well. I think we can repeat these labs in 1-month and if still any abnormality, send to hematology. I will be in contact once the other part of the lab returns. Thanks! 100

## 2023-12-28 ENCOUNTER — TELEPHONE (OUTPATIENT)
Age: 88
End: 2023-12-28

## 2023-12-28 DIAGNOSIS — R79.9 ABNORMAL BLOOD SMEAR: Primary | ICD-10-CM

## 2023-12-28 NOTE — TELEPHONE ENCOUNTER
Called and spoke with daughter Sara Borjas and let her know that that patient needed to be seen by hematology for most recent lab work. She understood. Gave her referral information.

## 2023-12-28 NOTE — TELEPHONE ENCOUNTER
----- Message from Belén Villaseñor MD sent at 12/28/2023 12:20 PM EST -----  Please let the patient know that the additional study on her blood suggest that she needs to see a hematologist at this time for further evaluation. It was abnormal. I have placed a referral to hematology. Please give information. Thanks!

## 2024-01-09 DIAGNOSIS — R79.89 ABNORMAL CBC: Primary | ICD-10-CM

## 2024-01-10 ENCOUNTER — TELEPHONE (OUTPATIENT)
Age: 89
End: 2024-01-10

## 2024-01-10 NOTE — TELEPHONE ENCOUNTER
----- Message from Rafaela Anderson MD sent at 1/9/2024  8:47 AM EST -----  Please let patient and her daughter know that the final results from her blood smear returned and showed some abnormalities that need to be evaluated by hematology. I will put a referral in the chart and please give them the information. She had some abnormal cells noted and this needs further management.

## 2024-01-10 NOTE — PROGRESS NOTES
[facility] [place]  Medical Oncology at [place]  [phone]    Hematology / Oncology Consult    Reason for Visit:   Karyn Richardson is a 89 y.o. female who is seen in consultation at the request of Dr. Rafaela Anderson for evaluation of \"abnormal blood smear.\"    Hematology Oncology Treatment History:     Diagnosis: T-large granular lymphocytic leukemia    Stage: n/a    Pathology:   12/22/23 peripheral smear:  Blood, smear:        Normochromic normocytic erythrocytes with mild anisopoikilocytosis.   Neutrophils are normal in number with occasional hypolobated forms.   Lymphocytes include large granular forms and occasional atypical forms.   Monocytes are unremarkable.   Platelets are normal in number.     -Flow cytometry:  Diagnosis: Phenotypically abnormal T-cells detected by flow cytometry  Comments: The flow cytometry findings are concerning for a T-cell   lymphoproliferative disorder; however, a reactive condition cannot be   completely excluded. Please correlate with morphology and clinical   information for more definitive diagnosis.  PCR testing for TCR-gamma gene   is ordered and results will be reported separately if specimen   quantity/quality is sufficient.     -MOLECULAR DIAGNOSTICS - T-Cell Receptor Gamma Gene Rearrangement   T-Cell Receptor Gamma Gene Rearrangement - Positive     Prior Treatment: None    Current Treatment: Observation    History of Present Illness:   Karyn Richardson is a 89 y.o. female who is seen for elevated lymphocyte count. Pt states she is doing well overall. She was noted to have an abnormal CBC with some increased lymphocytes and immature cells during a routine wellness visit. Had Covid in 2022, but denies any recurrent or serious infections. No unintentional weight loss, fevers, chills, sweats. No enlarged LN.   Accompanied by daughter today.     Past Medical History:   Diagnosis Date    Anemia     Sees Dr. Chino De La Cruz and is on a prescription multivitamin call

## 2024-01-10 NOTE — TELEPHONE ENCOUNTER
Labs reviewed with patient and they verbalized understanding   Already have appt and was aware of labs.

## 2024-01-12 ENCOUNTER — OFFICE VISIT (OUTPATIENT)
Age: 89
End: 2024-01-12
Payer: MEDICARE

## 2024-01-12 VITALS
HEIGHT: 66 IN | SYSTOLIC BLOOD PRESSURE: 150 MMHG | WEIGHT: 143 LBS | BODY MASS INDEX: 22.98 KG/M2 | HEART RATE: 95 BPM | DIASTOLIC BLOOD PRESSURE: 71 MMHG | RESPIRATION RATE: 18 BRPM | TEMPERATURE: 98.1 F | OXYGEN SATURATION: 97 %

## 2024-01-12 DIAGNOSIS — Z86.2 HISTORY OF ANEMIA: ICD-10-CM

## 2024-01-12 DIAGNOSIS — C91.Z0 T-CELL LARGE GRANULAR LYMPHOCYTIC LEUKEMIA (HCC): Primary | ICD-10-CM

## 2024-01-12 DIAGNOSIS — I10 PRIMARY HYPERTENSION: ICD-10-CM

## 2024-01-12 PROCEDURE — 99204 OFFICE O/P NEW MOD 45 MIN: CPT | Performed by: INTERNAL MEDICINE

## 2024-01-12 ASSESSMENT — PATIENT HEALTH QUESTIONNAIRE - PHQ9
SUM OF ALL RESPONSES TO PHQ QUESTIONS 1-9: 0
SUM OF ALL RESPONSES TO PHQ QUESTIONS 1-9: 0
SUM OF ALL RESPONSES TO PHQ9 QUESTIONS 1 & 2: 0
2. FEELING DOWN, DEPRESSED OR HOPELESS: 0
SUM OF ALL RESPONSES TO PHQ QUESTIONS 1-9: 0
SUM OF ALL RESPONSES TO PHQ QUESTIONS 1-9: 0
1. LITTLE INTEREST OR PLEASURE IN DOING THINGS: 0

## 2024-01-12 NOTE — PROGRESS NOTES
Chief Complaint   Patient presents with    New Patient           Vitals:    01/12/24 1151   BP: (!) 150/71   Pulse: 95   Resp: 18   Temp: 98.1 °F (36.7 °C)   SpO2: 97%      Patient reports a fall in 2022,Admitted to [facility] x 1 week FX vertebrae & Kyphoplasty       1. Have you been to the ER, urgent care clinic since your last visit?  Hospitalized since your last visit?  New Patient  2. Have you seen or consulted any other health care providers outside of the [facility] since your last visit?  Include any pap smears or colon screening. New Patient

## 2024-01-26 ENCOUNTER — HOSPITAL ENCOUNTER (OUTPATIENT)
Facility: HOSPITAL | Age: 89
End: 2024-01-26
Attending: INTERNAL MEDICINE
Payer: MEDICARE

## 2024-01-26 DIAGNOSIS — C91.Z0 T-CELL LARGE GRANULAR LYMPHOCYTIC LEUKEMIA (HCC): ICD-10-CM

## 2024-01-26 PROCEDURE — 76700 US EXAM ABDOM COMPLETE: CPT

## 2024-02-14 RX ORDER — LOSARTAN POTASSIUM 100 MG/1
TABLET ORAL
Qty: 90 TABLET | Refills: 0 | Status: SHIPPED | OUTPATIENT
Start: 2024-02-14

## 2024-04-26 LAB
ALBUMIN SERPL-MCNC: 4.3 G/DL (ref 3.6–4.6)
ALBUMIN/GLOB SERPL: 1.7 {RATIO} (ref 1.2–2.2)
ALP SERPL-CCNC: 93 IU/L (ref 44–121)
ALT SERPL-CCNC: 13 IU/L (ref 0–32)
AST SERPL-CCNC: 23 IU/L (ref 0–40)
BASOPHILS # BLD AUTO: 0 X10E3/UL (ref 0–0.2)
BASOPHILS NFR BLD AUTO: 0 %
BILIRUB SERPL-MCNC: 0.4 MG/DL (ref 0–1.2)
BUN SERPL-MCNC: 16 MG/DL (ref 10–36)
BUN/CREAT SERPL: 24 (ref 12–28)
CALCIUM SERPL-MCNC: 10.6 MG/DL (ref 8.7–10.3)
CHLORIDE SERPL-SCNC: 101 MMOL/L (ref 96–106)
CO2 SERPL-SCNC: 20 MMOL/L (ref 20–29)
CREAT SERPL-MCNC: 0.68 MG/DL (ref 0.57–1)
EGFRCR SERPLBLD CKD-EPI 2021: 81 ML/MIN/1.73
EOSINOPHIL # BLD AUTO: 0 X10E3/UL (ref 0–0.4)
EOSINOPHIL NFR BLD AUTO: 0 %
ERYTHROCYTE [DISTWIDTH] IN BLOOD BY AUTOMATED COUNT: 13.8 % (ref 11.7–15.4)
GLOBULIN SER CALC-MCNC: 2.5 G/DL (ref 1.5–4.5)
GLUCOSE SERPL-MCNC: 127 MG/DL (ref 70–99)
HCT VFR BLD AUTO: 42.1 % (ref 34–46.6)
HGB BLD-MCNC: 13.6 G/DL (ref 11.1–15.9)
LYMPHOCYTES # BLD AUTO: 0.7 X10E3/UL (ref 0.7–3.1)
LYMPHOCYTES NFR BLD AUTO: 14 %
MCH RBC QN AUTO: 28.3 PG (ref 26.6–33)
MCHC RBC AUTO-ENTMCNC: 32.3 G/DL (ref 31.5–35.7)
MCV RBC AUTO: 88 FL (ref 79–97)
MONOCYTES # BLD AUTO: 0.2 X10E3/UL (ref 0.1–0.9)
MONOCYTES NFR BLD AUTO: 4 %
MORPHOLOGY BLD-IMP: NORMAL
NEUTROPHILS # BLD AUTO: 4.2 X10E3/UL (ref 1.4–7)
NEUTROPHILS NFR BLD AUTO: 82 %
PLATELET # BLD AUTO: 189 X10E3/UL (ref 150–450)
POTASSIUM SERPL-SCNC: 4.2 MMOL/L (ref 3.5–5.2)
PROT SERPL-MCNC: 6.8 G/DL (ref 6–8.5)
RBC # BLD AUTO: 4.81 X10E6/UL (ref 3.77–5.28)
SODIUM SERPL-SCNC: 138 MMOL/L (ref 134–144)
WBC # BLD AUTO: 5.1 X10E3/UL (ref 3.4–10.8)

## 2024-04-30 LAB
LDH SERPL L TO P-CCNC: 220 IU/L (ref 119–226)
LDH1 CFR SERPL ELPH: 24 % (ref 17–32)
LDH2 CFR SERPL ELPH: 42 % (ref 25–40)
LDH3 CFR SERPL ELPH: 20 % (ref 17–27)
LDH4 CFR SERPL ELPH: 7 % (ref 5–13)
LDH5 CFR SERPL ELPH: 7 % (ref 4–20)

## 2024-05-03 ENCOUNTER — TELEPHONE (OUTPATIENT)
Age: 89
End: 2024-05-03

## 2024-05-03 NOTE — TELEPHONE ENCOUNTER
Patients daughter called and stated they will not be able it make it to her appointment today and rescheduled for the 24th at 9:45.

## 2024-05-06 DIAGNOSIS — I10 PRIMARY HYPERTENSION: ICD-10-CM

## 2024-05-06 RX ORDER — AMLODIPINE BESYLATE 10 MG/1
TABLET ORAL
Qty: 90 TABLET | Refills: 0 | Status: SHIPPED | OUTPATIENT
Start: 2024-05-06

## 2024-05-13 RX ORDER — LOSARTAN POTASSIUM 100 MG/1
TABLET ORAL
Qty: 90 TABLET | Refills: 0 | Status: SHIPPED | OUTPATIENT
Start: 2024-05-13

## 2024-05-15 ENCOUNTER — TELEPHONE (OUTPATIENT)
Age: 89
End: 2024-05-15

## 2024-05-27 DIAGNOSIS — D50.9 IRON DEFICIENCY ANEMIA, UNSPECIFIED IRON DEFICIENCY ANEMIA TYPE: ICD-10-CM

## 2024-05-27 RX ORDER — IRON ASPGLY/C/B12/FA/CA-TH/SUC 70-150-1MG
1 TABLET ORAL DAILY
Qty: 90 TABLET | Refills: 0 | Status: SHIPPED | OUTPATIENT
Start: 2024-05-27

## 2024-05-29 NOTE — PROGRESS NOTES
[facility] [place]  Medical Oncology at [place]  [phone]    Hematology / Oncology Established Visit    Reason for Visit:   Karyn Richardson is a 89 y.o. female who is seen for follow up of T-LGL.    Hematology Oncology Treatment History:     Diagnosis: T-large granular lymphocytic leukemia    Stage: n/a    Pathology:   12/22/23 peripheral smear:  Blood, smear:        Normochromic normocytic erythrocytes with mild anisopoikilocytosis.   Neutrophils are normal in number with occasional hypolobated forms.   Lymphocytes include large granular forms and occasional atypical forms.   Monocytes are unremarkable.   Platelets are normal in number.     -Flow cytometry:  Diagnosis: Phenotypically abnormal T-cells detected by flow cytometry  Comments: The flow cytometry findings are concerning for a T-cell   lymphoproliferative disorder; however, a reactive condition cannot be   completely excluded. Please correlate with morphology and clinical   information for more definitive diagnosis.  PCR testing for TCR-gamma gene   is ordered and results will be reported separately if specimen   quantity/quality is sufficient.     -MOLECULAR DIAGNOSTICS - T-Cell Receptor Gamma Gene Rearrangement   T-Cell Receptor Gamma Gene Rearrangement - Positive     Prior Treatment: None    Current Treatment: Observation    History of Present Illness:   Karyn Richardson is a 89 y.o. female who is seen for follow up of T-LGL.   Pt states she is doing well overall. She was noted to have an abnormal CBC with some increased lymphocytes and immature cells during a routine wellness visit. Had Covid in 2022, but denies any recurrent or serious infections. No unintentional weight loss, fevers, chills, sweats. No enlarged LN. Today, she is feeling well overall with stable energy levels. Has questions about her Multigen folic vitamin.  Accompanied by daughter today.     Past Medical History:   Diagnosis Date    Anemia     Sees Dr. Chino De La Cruz

## 2024-05-30 ENCOUNTER — OFFICE VISIT (OUTPATIENT)
Age: 89
End: 2024-05-30
Payer: MEDICARE

## 2024-05-30 VITALS
HEIGHT: 66 IN | DIASTOLIC BLOOD PRESSURE: 72 MMHG | TEMPERATURE: 97.3 F | OXYGEN SATURATION: 96 % | HEART RATE: 81 BPM | WEIGHT: 143 LBS | SYSTOLIC BLOOD PRESSURE: 158 MMHG | RESPIRATION RATE: 18 BRPM | BODY MASS INDEX: 22.98 KG/M2

## 2024-05-30 DIAGNOSIS — E83.52 HYPERCALCEMIA: ICD-10-CM

## 2024-05-30 DIAGNOSIS — C91.Z0 T-CELL LARGE GRANULAR LYMPHOCYTIC LEUKEMIA (HCC): Primary | ICD-10-CM

## 2024-05-30 DIAGNOSIS — I10 PRIMARY HYPERTENSION: ICD-10-CM

## 2024-05-30 LAB
25(OH)D3 SERPL-MCNC: 42 NG/ML (ref 30–100)
CALCIUM SERPL-MCNC: 10.9 MG/DL (ref 8.5–10.1)
PTH-INTACT SERPL-MCNC: 93.8 PG/ML (ref 18.4–88)

## 2024-05-30 PROCEDURE — G8427 DOCREV CUR MEDS BY ELIG CLIN: HCPCS | Performed by: NURSE PRACTITIONER

## 2024-05-30 PROCEDURE — 99214 OFFICE O/P EST MOD 30 MIN: CPT | Performed by: NURSE PRACTITIONER

## 2024-05-30 PROCEDURE — 1036F TOBACCO NON-USER: CPT | Performed by: NURSE PRACTITIONER

## 2024-05-30 PROCEDURE — G8420 CALC BMI NORM PARAMETERS: HCPCS | Performed by: NURSE PRACTITIONER

## 2024-05-30 PROCEDURE — 1090F PRES/ABSN URINE INCON ASSESS: CPT | Performed by: NURSE PRACTITIONER

## 2024-05-30 PROCEDURE — 1123F ACP DISCUSS/DSCN MKR DOCD: CPT | Performed by: NURSE PRACTITIONER

## 2024-05-30 ASSESSMENT — PATIENT HEALTH QUESTIONNAIRE - PHQ9
SUM OF ALL RESPONSES TO PHQ QUESTIONS 1-9: 0
2. FEELING DOWN, DEPRESSED OR HOPELESS: NOT AT ALL
1. LITTLE INTEREST OR PLEASURE IN DOING THINGS: NOT AT ALL
SUM OF ALL RESPONSES TO PHQ QUESTIONS 1-9: 0
SUM OF ALL RESPONSES TO PHQ9 QUESTIONS 1 & 2: 0
SUM OF ALL RESPONSES TO PHQ QUESTIONS 1-9: 0
SUM OF ALL RESPONSES TO PHQ QUESTIONS 1-9: 0

## 2024-05-30 NOTE — PROGRESS NOTES
Chief Complaint   Patient presents with    Follow-up           Vitals:    05/30/24 1009   BP: (!) 158/72   Pulse: 81   Resp: 18   Temp: 97.3 °F (36.3 °C)   SpO2: 96%            1. Have you been to the ER, urgent care clinic since your last visit?  Hospitalized since your last visit?  No  2. Have you seen or consulted any other health care providers outside of the [facility] since your last visit?  Include any pap smears or colon screening. Yes Opthomologist Dr Rogel

## 2024-06-11 ENCOUNTER — TELEPHONE (OUTPATIENT)
Age: 89
End: 2024-06-11

## 2024-06-11 NOTE — TELEPHONE ENCOUNTER
Pt daughter called in stating she would like  clarification on if the pt should schedule a follow up visit with her PCP for high calcium.     # [phone]

## 2024-06-12 NOTE — TELEPHONE ENCOUNTER
06/12/24 11:55 AM Attempted to call patient's daughter, Pratima (listed on PHI) via contact number provided. No answer and voicemail has not been activated yet. Will attempt to call daughter back later if no return call received.

## 2024-06-14 NOTE — TELEPHONE ENCOUNTER
[facility]  [phone]    06/14/24 2:55 PM EDT - Attempted to reach the patient's daughter, Pratima.  There was no answer.  Left a voicemail for her to call back at her earliest convenience along with the phone number to our office.           [facility]  [phone]    06/14/24 3:00 PM EDT - Received call from Pratima.  Advised her of the NP's response below.  Pratima voiced understanding and gratitude for the call.  She states the patient has an appointment to follow-up with her PCP.  No further questions or concerns.         NP's message:   She can follow up with PCP and discuss how frequently PCP wants to monitor her calcium level. Dr. Ritter will recheck in 6 months, so she may want to check her calcium level checked in the next 3 months. Thank you

## 2024-06-28 ENCOUNTER — OFFICE VISIT (OUTPATIENT)
Age: 89
End: 2024-06-28
Payer: MEDICARE

## 2024-06-28 ENCOUNTER — NURSE ONLY (OUTPATIENT)
Age: 89
End: 2024-06-28
Payer: MEDICARE

## 2024-06-28 VITALS
TEMPERATURE: 99 F | RESPIRATION RATE: 15 BRPM | BODY MASS INDEX: 23.95 KG/M2 | DIASTOLIC BLOOD PRESSURE: 60 MMHG | SYSTOLIC BLOOD PRESSURE: 136 MMHG | OXYGEN SATURATION: 95 % | HEIGHT: 66 IN | WEIGHT: 149 LBS | HEART RATE: 86 BPM

## 2024-06-28 DIAGNOSIS — R53.83 MALAISE AND FATIGUE: ICD-10-CM

## 2024-06-28 DIAGNOSIS — R53.81 MALAISE AND FATIGUE: ICD-10-CM

## 2024-06-28 DIAGNOSIS — E83.52 HYPERCALCEMIA: Primary | ICD-10-CM

## 2024-06-28 DIAGNOSIS — E83.52 HYPERCALCEMIA: ICD-10-CM

## 2024-06-28 PROCEDURE — G8427 DOCREV CUR MEDS BY ELIG CLIN: HCPCS | Performed by: INTERNAL MEDICINE

## 2024-06-28 PROCEDURE — 1090F PRES/ABSN URINE INCON ASSESS: CPT | Performed by: INTERNAL MEDICINE

## 2024-06-28 PROCEDURE — 99213 OFFICE O/P EST LOW 20 MIN: CPT | Performed by: INTERNAL MEDICINE

## 2024-06-28 PROCEDURE — G8420 CALC BMI NORM PARAMETERS: HCPCS | Performed by: INTERNAL MEDICINE

## 2024-06-28 PROCEDURE — 1123F ACP DISCUSS/DSCN MKR DOCD: CPT | Performed by: INTERNAL MEDICINE

## 2024-06-28 PROCEDURE — 1036F TOBACCO NON-USER: CPT | Performed by: INTERNAL MEDICINE

## 2024-06-28 RX ORDER — TOBRAMYCIN / DEXAMETHASONE 3; .5 MG/ML; MG/ML
SUSPENSION/ DROPS OPHTHALMIC
COMMUNITY
Start: 2024-06-14 | End: 2024-06-28 | Stop reason: CLARIF

## 2024-06-28 ASSESSMENT — ENCOUNTER SYMPTOMS
RESPIRATORY NEGATIVE: 1
GASTROINTESTINAL NEGATIVE: 1
EYES NEGATIVE: 1
ALLERGIC/IMMUNOLOGIC NEGATIVE: 1

## 2024-06-28 NOTE — PROGRESS NOTES
Chief Complaint   Patient presents with    Results    Follow-up     Follow up from DR. Ritter with regards to thyroid.     Assessment/ Plan:   Karyn was seen today for results and follow-up.    Diagnoses and all orders for this visit:    Hypercalcemia  -     CBC with Auto Differential; Future  -     Comprehensive Metabolic Panel; Future  -     Calcium, Ionized ([facility] Default); Future  -     PTH, Intact; Future  -     Vitamin D 25 Hydroxy; Future  -     Magnesium; Future    Malaise and fatigue  -     TSH + Free T4 Panel; Future    I have discussed the diagnosis with the patient and the intended treatment plan as seen in the above orders. The patient has received an after-visit summary and questions were answered concerning future plans. Asked to return should symptoms worsen or not improve with treatment. Any pending labs and studies will be relayed to patient when they become available.     Pt verbalizes understanding of plan of care and denies further questions or concerns at this time.       Follow Up:  Return if symptoms worsen or fail to improve.    Subjective:   Karyn Richardson is a 89 y.o. female who presents today for follow up. She had been evaluated by Dr. Ritter (oncology) for leukocytosis and subseqently found to have an elevated calcium - which was new. Repeat calcium and iPTH were also elevated. She has no hypercalcemia symptoms.   We are asked to help in the investigation and etiology of the hypercalcemia.   Will start with labs as outlined above.   She also has had onset of itchy red eyes and ophthalmologist put her on long term Tobradex.   She asked about the possibility of ESTELLE, but has no h/o hyperthyroid. We will check TSH + T4 today.     HISTORICAL  PMH, PSH, FHX, SOCHX, ALLERGIES and MEDS were reviewed and updated today.      Review of Systems  Review of Systems   Constitutional: Negative.    HENT: Negative.     Eyes: Negative.    Respiratory: Negative.     Cardiovascular: Negative.

## 2024-06-28 NOTE — PROGRESS NOTES
Rm    Chief Complaint   Patient presents with    Results    Follow-up     Follow up from DR. Ritter with regards to thyroid.        /60 (Site: Left Upper Arm)   Pulse 86   Temp 99 °F (37.2 °C)   Resp 15   Ht 1.676 m (5' 6\")   Wt 67.6 kg (149 lb)   SpO2 95%   BMI 24.05 kg/m²      1. Have you been to the ER, urgent care clinic since your last visit?  Hospitalized since your last visit? No     2. Have you seen or consulted any other health care providers outside of the [facility] since your last visit?  Include any pap smears or colon screening. No     Health Maintenance Due   Topic Date Due    COVID-19 Vaccine (1) Never done    DTaP/Tdap/Td vaccine (1 - Tdap) Never done    Shingles vaccine (1 of 2) Never done    Respiratory Syncytial Virus (RSV) Pregnant or age 60 yrs+ (1 - 1-dose 60+ series) Never done             No data to display                 Failed to redirect to the Timeline version of the [employer] SmartLink.    Failed to redirect to the Timeline version of the [employer] SmartLink.

## 2024-06-29 LAB
25(OH)D3 SERPL-MCNC: 37.7 NG/ML (ref 30–100)
ALBUMIN SERPL-MCNC: 3.9 G/DL (ref 3.5–5)
ALBUMIN/GLOB SERPL: 1.1 (ref 1.1–2.2)
ALP SERPL-CCNC: 94 U/L (ref 45–117)
ALT SERPL-CCNC: 22 U/L (ref 12–78)
ANION GAP SERPL CALC-SCNC: 6 MMOL/L (ref 5–15)
AST SERPL-CCNC: 23 U/L (ref 15–37)
BASOPHILS # BLD: 0 K/UL (ref 0–0.1)
BASOPHILS NFR BLD: 0 % (ref 0–1)
BILIRUB SERPL-MCNC: 0.7 MG/DL (ref 0.2–1)
BUN SERPL-MCNC: 7 MG/DL (ref 6–20)
BUN/CREAT SERPL: 9 (ref 12–20)
CALCIUM SERPL-MCNC: 10.4 MG/DL (ref 8.5–10.1)
CALCIUM SERPL-MCNC: 10.8 MG/DL (ref 8.5–10.1)
CHLORIDE SERPL-SCNC: 105 MMOL/L (ref 97–108)
CO2 SERPL-SCNC: 27 MMOL/L (ref 21–32)
CREAT SERPL-MCNC: 0.77 MG/DL (ref 0.55–1.02)
DIFFERENTIAL METHOD BLD: ABNORMAL
EOSINOPHIL # BLD: 0 K/UL (ref 0–0.4)
EOSINOPHIL NFR BLD: 0 % (ref 0–7)
ERYTHROCYTE [DISTWIDTH] IN BLOOD BY AUTOMATED COUNT: 14.1 % (ref 11.5–14.5)
GLOBULIN SER CALC-MCNC: 3.4 G/DL (ref 2–4)
GLUCOSE SERPL-MCNC: 111 MG/DL (ref 65–100)
HCT VFR BLD AUTO: 40.8 % (ref 35–47)
HGB BLD-MCNC: 13.4 G/DL (ref 11.5–16)
IMM GRANULOCYTES # BLD AUTO: 0 K/UL
IMM GRANULOCYTES NFR BLD AUTO: 0 %
LYMPHOCYTES # BLD: 1.5 K/UL (ref 0.8–3.5)
LYMPHOCYTES NFR BLD: 45 % (ref 12–49)
MAGNESIUM SERPL-MCNC: 2.3 MG/DL (ref 1.6–2.4)
MCH RBC QN AUTO: 28.7 PG (ref 26–34)
MCHC RBC AUTO-ENTMCNC: 32.8 G/DL (ref 30–36.5)
MCV RBC AUTO: 87.4 FL (ref 80–99)
MONOCYTES # BLD: 0.1 K/UL (ref 0–1)
MONOCYTES NFR BLD: 4 % (ref 5–13)
NEUTS BAND NFR BLD MANUAL: 2 % (ref 0–6)
NEUTS SEG # BLD: 1.7 K/UL (ref 1.8–8)
NEUTS SEG NFR BLD: 49 % (ref 32–75)
NRBC # BLD: 0 K/UL (ref 0–0.01)
NRBC BLD-RTO: 0 PER 100 WBC
PLATELET # BLD AUTO: 189 K/UL (ref 150–400)
PMV BLD AUTO: 9.3 FL (ref 8.9–12.9)
POTASSIUM SERPL-SCNC: 4.2 MMOL/L (ref 3.5–5.1)
PROT SERPL-MCNC: 7.3 G/DL (ref 6.4–8.2)
PTH-INTACT SERPL-MCNC: 108.7 PG/ML (ref 18.4–88)
RBC # BLD AUTO: 4.67 M/UL (ref 3.8–5.2)
RBC MORPH BLD: ABNORMAL
SODIUM SERPL-SCNC: 138 MMOL/L (ref 136–145)
T4 FREE SERPL-MCNC: 1.1 NG/DL (ref 0.8–1.5)
TSH SERPL DL<=0.05 MIU/L-ACNC: 1.44 UIU/ML (ref 0.36–3.74)
WBC # BLD AUTO: 3.3 K/UL (ref 3.6–11)

## 2024-06-30 DIAGNOSIS — E83.52 HYPERCALCEMIA: Primary | ICD-10-CM

## 2024-06-30 LAB — CA-I SERPL ISE-MCNC: 5.5 MG/DL (ref 4.5–5.6)

## 2024-07-01 ENCOUNTER — TELEPHONE (OUTPATIENT)
Age: 89
End: 2024-07-01

## 2024-07-01 NOTE — TELEPHONE ENCOUNTER
----- Message from Rafaela Anderson MD sent at 6/30/2024  8:45 PM EDT -----  Just before closing the chart, her ionized calcium came back normal.   Will still refer to endocrinology.   Will send to see Dr. Becky Perez.   Give referral info.     Thanks!

## 2024-07-01 NOTE — TELEPHONE ENCOUNTER
----- Message from Rafaela Anderson MD sent at 6/30/2024  8:24 PM EDT -----  Please let patient know that her calcium remains elevated and her iPTH is also elevated. I am awaiting the ionized calcium, but more likely than not, I will give her a referral to see an endocrinologist to evaluate why her calcium has elevated and to correct the findings. Thanks!

## 2024-08-09 DIAGNOSIS — I10 PRIMARY HYPERTENSION: ICD-10-CM

## 2024-08-09 RX ORDER — LOSARTAN POTASSIUM 100 MG/1
TABLET ORAL
Qty: 90 TABLET | Refills: 0 | Status: SHIPPED | OUTPATIENT
Start: 2024-08-09

## 2024-08-10 RX ORDER — AMLODIPINE BESYLATE 10 MG/1
TABLET ORAL
Qty: 90 TABLET | Refills: 0 | Status: SHIPPED | OUTPATIENT
Start: 2024-08-10

## 2024-08-28 DIAGNOSIS — D50.9 IRON DEFICIENCY ANEMIA, UNSPECIFIED IRON DEFICIENCY ANEMIA TYPE: ICD-10-CM

## 2024-08-29 RX ORDER — IRON ASPGLY/C/B12/FA/CA-TH/SUC 70-150-1MG
1 TABLET ORAL DAILY
Qty: 90 TABLET | Refills: 0 | Status: SHIPPED | OUTPATIENT
Start: 2024-08-29

## 2024-09-09 ENCOUNTER — APPOINTMENT (OUTPATIENT)
Facility: HOSPITAL | Age: 89
End: 2024-09-09
Payer: MEDICARE

## 2024-09-09 ENCOUNTER — OFFICE VISIT (OUTPATIENT)
Age: 89
End: 2024-09-09
Payer: MEDICARE

## 2024-09-09 ENCOUNTER — HOSPITAL ENCOUNTER (EMERGENCY)
Facility: HOSPITAL | Age: 89
Discharge: HOME | End: 2024-09-09
Attending: STUDENT IN AN ORGANIZED HEALTH CARE EDUCATION/TRAINING PROGRAM
Payer: MEDICARE

## 2024-09-09 VITALS
HEART RATE: 94 BPM | TEMPERATURE: 97.8 F | SYSTOLIC BLOOD PRESSURE: 155 MMHG | WEIGHT: 149 LBS | RESPIRATION RATE: 13 BRPM | DIASTOLIC BLOOD PRESSURE: 59 MMHG | OXYGEN SATURATION: 94 % | BODY MASS INDEX: 23.95 KG/M2 | HEIGHT: 66 IN

## 2024-09-09 VITALS
HEIGHT: 66 IN | SYSTOLIC BLOOD PRESSURE: 126 MMHG | DIASTOLIC BLOOD PRESSURE: 58 MMHG | TEMPERATURE: 98.2 F | OXYGEN SATURATION: 98 % | WEIGHT: 149 LBS | BODY MASS INDEX: 23.95 KG/M2 | HEART RATE: 104 BPM | RESPIRATION RATE: 20 BRPM

## 2024-09-09 DIAGNOSIS — N30.01 ACUTE CYSTITIS WITH HEMATURIA: Primary | ICD-10-CM

## 2024-09-09 DIAGNOSIS — Z91.81 AT HIGH RISK FOR FALLS: ICD-10-CM

## 2024-09-09 DIAGNOSIS — R65.10 SIRS (SYSTEMIC INFLAMMATORY RESPONSE SYNDROME) (HCC): Primary | ICD-10-CM

## 2024-09-09 LAB
ALBUMIN SERPL-MCNC: 3.1 G/DL (ref 3.5–5)
ALBUMIN/GLOB SERPL: 0.8 (ref 1.1–2.2)
ALP SERPL-CCNC: 90 U/L (ref 45–117)
ALT SERPL-CCNC: 25 U/L (ref 12–78)
AMORPH CRY URNS QL MICRO: ABNORMAL
ANION GAP SERPL CALC-SCNC: 8 MMOL/L (ref 2–12)
APPEARANCE UR: ABNORMAL
AST SERPL-CCNC: 31 U/L (ref 15–37)
BACTERIA URNS QL MICRO: NEGATIVE /HPF
BASOPHILS # BLD: 0 K/UL (ref 0–0.1)
BASOPHILS NFR BLD: 0 % (ref 0–1)
BILIRUB SERPL-MCNC: 0.4 MG/DL (ref 0.2–1)
BILIRUB UR QL: NEGATIVE
BUN SERPL-MCNC: 13 MG/DL (ref 6–20)
BUN/CREAT SERPL: 13 (ref 12–20)
CALCIUM SERPL-MCNC: 10.1 MG/DL (ref 8.5–10.1)
CHLORIDE SERPL-SCNC: 99 MMOL/L (ref 97–108)
CO2 SERPL-SCNC: 26 MMOL/L (ref 21–32)
COLOR UR: ABNORMAL
COMMENT:: NORMAL
CREAT SERPL-MCNC: 1.03 MG/DL (ref 0.55–1.02)
DIFFERENTIAL METHOD BLD: NORMAL
EOSINOPHIL # BLD: 0 K/UL (ref 0–0.4)
EOSINOPHIL NFR BLD: 0 % (ref 0–7)
EPITH CASTS URNS QL MICRO: ABNORMAL /LPF
ERYTHROCYTE [DISTWIDTH] IN BLOOD BY AUTOMATED COUNT: 13.3 % (ref 11.5–14.5)
FLUAV RNA SPEC QL NAA+PROBE: NOT DETECTED
FLUBV RNA SPEC QL NAA+PROBE: NOT DETECTED
GLOBULIN SER CALC-MCNC: 4 G/DL (ref 2–4)
GLUCOSE SERPL-MCNC: 177 MG/DL (ref 65–100)
GLUCOSE UR STRIP.AUTO-MCNC: NEGATIVE MG/DL
HCT VFR BLD AUTO: 35 % (ref 35–47)
HGB BLD-MCNC: 11.8 G/DL (ref 11.5–16)
HGB UR QL STRIP: ABNORMAL
IMM GRANULOCYTES # BLD AUTO: 0 K/UL
IMM GRANULOCYTES NFR BLD AUTO: 0 %
KETONES UR QL STRIP.AUTO: NEGATIVE MG/DL
LEUKOCYTE ESTERASE UR QL STRIP.AUTO: ABNORMAL
LYMPHOCYTES # BLD: 1.3 K/UL (ref 0.8–3.5)
LYMPHOCYTES NFR BLD: 32 % (ref 12–49)
MAGNESIUM SERPL-MCNC: 1.8 MG/DL (ref 1.6–2.4)
MCH RBC QN AUTO: 28 PG (ref 26–34)
MCHC RBC AUTO-ENTMCNC: 33.7 G/DL (ref 30–36.5)
MCV RBC AUTO: 82.9 FL (ref 80–99)
MONOCYTES # BLD: 0.3 K/UL (ref 0–1)
MONOCYTES NFR BLD: 8 % (ref 5–13)
NEUTS BAND NFR BLD MANUAL: 2 % (ref 0–6)
NEUTS SEG # BLD: 2.6 K/UL (ref 1.8–8)
NEUTS SEG NFR BLD: 58 % (ref 32–75)
NITRITE UR QL STRIP.AUTO: NEGATIVE
NRBC # BLD: 0 K/UL (ref 0–0.01)
NRBC BLD-RTO: 0 PER 100 WBC
NT PRO BNP: 1120 PG/ML (ref 0–450)
PH UR STRIP: 6 (ref 5–8)
PLATELET # BLD AUTO: 247 K/UL (ref 150–400)
PMV BLD AUTO: 8.9 FL (ref 8.9–12.9)
POTASSIUM SERPL-SCNC: 4.1 MMOL/L (ref 3.5–5.1)
PROT SERPL-MCNC: 7.1 G/DL (ref 6.4–8.2)
PROT UR STRIP-MCNC: NEGATIVE MG/DL
RBC # BLD AUTO: 4.22 M/UL (ref 3.8–5.2)
RBC #/AREA URNS HPF: ABNORMAL /HPF (ref 0–5)
RBC MORPH BLD: NORMAL
SARS-COV-2 RNA RESP QL NAA+PROBE: NOT DETECTED
SODIUM SERPL-SCNC: 133 MMOL/L (ref 136–145)
SOURCE: NORMAL
SP GR UR REFRACTOMETRY: 1.01 (ref 1–1.03)
SPECIMEN HOLD: NORMAL
TROPONIN I SERPL HS-MCNC: 11 NG/L (ref 0–51)
TROPONIN I SERPL HS-MCNC: 15 NG/L (ref 0–51)
URINE CULTURE IF INDICATED: ABNORMAL
UROBILINOGEN UR QL STRIP.AUTO: 0.2 EU/DL (ref 0.2–1)
WBC # BLD AUTO: 4.2 K/UL (ref 3.6–11)
WBC MORPH BLD: NORMAL
WBC URNS QL MICRO: ABNORMAL /HPF (ref 0–4)

## 2024-09-09 PROCEDURE — 99215 OFFICE O/P EST HI 40 MIN: CPT | Performed by: FAMILY MEDICINE

## 2024-09-09 PROCEDURE — 80053 COMPREHEN METABOLIC PANEL: CPT

## 2024-09-09 PROCEDURE — 71046 X-RAY EXAM CHEST 2 VIEWS: CPT

## 2024-09-09 PROCEDURE — 84484 ASSAY OF TROPONIN QUANT: CPT

## 2024-09-09 PROCEDURE — 99285 EMERGENCY DEPT VISIT HI MDM: CPT

## 2024-09-09 PROCEDURE — 1123F ACP DISCUSS/DSCN MKR DOCD: CPT | Performed by: FAMILY MEDICINE

## 2024-09-09 PROCEDURE — 36415 COLL VENOUS BLD VENIPUNCTURE: CPT

## 2024-09-09 PROCEDURE — 6360000002 HC RX W HCPCS: Performed by: STUDENT IN AN ORGANIZED HEALTH CARE EDUCATION/TRAINING PROGRAM

## 2024-09-09 PROCEDURE — 96374 THER/PROPH/DIAG INJ IV PUSH: CPT

## 2024-09-09 PROCEDURE — 85025 COMPLETE CBC W/AUTO DIFF WBC: CPT

## 2024-09-09 PROCEDURE — 93005 ELECTROCARDIOGRAM TRACING: CPT | Performed by: STUDENT IN AN ORGANIZED HEALTH CARE EDUCATION/TRAINING PROGRAM

## 2024-09-09 PROCEDURE — 87636 SARSCOV2 & INF A&B AMP PRB: CPT

## 2024-09-09 PROCEDURE — 83880 ASSAY OF NATRIURETIC PEPTIDE: CPT

## 2024-09-09 PROCEDURE — 83735 ASSAY OF MAGNESIUM: CPT

## 2024-09-09 PROCEDURE — 2580000003 HC RX 258: Performed by: STUDENT IN AN ORGANIZED HEALTH CARE EDUCATION/TRAINING PROGRAM

## 2024-09-09 PROCEDURE — 81001 URINALYSIS AUTO W/SCOPE: CPT

## 2024-09-09 RX ORDER — CIPROFLOXACIN 500 MG/1
500 TABLET, FILM COATED ORAL 2 TIMES DAILY
Qty: 20 TABLET | Refills: 0 | Status: SHIPPED | OUTPATIENT
Start: 2024-09-10 | End: 2024-09-20

## 2024-09-09 RX ADMIN — WATER 1000 MG: 1 INJECTION INTRAMUSCULAR; INTRAVENOUS; SUBCUTANEOUS at 20:04

## 2024-09-09 ASSESSMENT — PAIN - FUNCTIONAL ASSESSMENT: PAIN_FUNCTIONAL_ASSESSMENT: NONE - DENIES PAIN

## 2024-09-09 ASSESSMENT — PATIENT HEALTH QUESTIONNAIRE - PHQ9
SUM OF ALL RESPONSES TO PHQ QUESTIONS 1-9: 0
1. LITTLE INTEREST OR PLEASURE IN DOING THINGS: NOT AT ALL
2. FEELING DOWN, DEPRESSED OR HOPELESS: NOT AT ALL
SUM OF ALL RESPONSES TO PHQ QUESTIONS 1-9: 0
SUM OF ALL RESPONSES TO PHQ9 QUESTIONS 1 & 2: 0

## 2024-09-09 ASSESSMENT — LIFESTYLE VARIABLES
HOW OFTEN DO YOU HAVE A DRINK CONTAINING ALCOHOL: MONTHLY OR LESS
HOW MANY STANDARD DRINKS CONTAINING ALCOHOL DO YOU HAVE ON A TYPICAL DAY: 1 OR 2
HOW OFTEN DO YOU HAVE A DRINK CONTAINING ALCOHOL: NEVER

## 2024-09-09 ASSESSMENT — PAIN SCALES - GENERAL: PAINLEVEL_OUTOF10: 0

## 2024-09-10 LAB
EKG ATRIAL RATE: 113 BPM
EKG DIAGNOSIS: NORMAL
EKG P AXIS: 59 DEGREES
EKG P-R INTERVAL: 182 MS
EKG Q-T INTERVAL: 350 MS
EKG QRS DURATION: 124 MS
EKG QTC CALCULATION (BAZETT): 480 MS
EKG R AXIS: 7 DEGREES
EKG T AXIS: 49 DEGREES
EKG VENTRICULAR RATE: 113 BPM

## 2024-09-10 PROCEDURE — 93010 ELECTROCARDIOGRAM REPORT: CPT | Performed by: SPECIALIST

## 2024-09-11 ENCOUNTER — TELEPHONE (OUTPATIENT)
Age: 89
End: 2024-09-11

## 2024-09-11 NOTE — TELEPHONE ENCOUNTER
Patients daughter Paula is calling to scheduled patient appointment with Dr. Barreto. Patient was last seen on 11/11/2022. Patient recently went to her PCP and from their she was told to go to the [place] ER due to feeling very fatigued and elevated heart rate. Patient also had an abnormal EKG. Patient needs a sooner appointment than what was offered during call. Patients adam Mitchell would like a call back ANA Mitchell(Patients daughter) phone # [phone]

## 2024-09-13 ENCOUNTER — OFFICE VISIT (OUTPATIENT)
Age: 89
End: 2024-09-13
Payer: MEDICARE

## 2024-09-13 VITALS
DIASTOLIC BLOOD PRESSURE: 64 MMHG | BODY MASS INDEX: 23.78 KG/M2 | SYSTOLIC BLOOD PRESSURE: 147 MMHG | HEIGHT: 66 IN | HEART RATE: 121 BPM | WEIGHT: 148 LBS

## 2024-09-13 DIAGNOSIS — M81.0 AGE-RELATED OSTEOPOROSIS WITHOUT CURRENT PATHOLOGICAL FRACTURE: ICD-10-CM

## 2024-09-13 DIAGNOSIS — R79.89 ELEVATED PARATHYROID HORMONE: Primary | ICD-10-CM

## 2024-09-13 DIAGNOSIS — E55.9 VITAMIN D DEFICIENCY: ICD-10-CM

## 2024-09-13 DIAGNOSIS — E83.52 HYPERCALCEMIA: ICD-10-CM

## 2024-09-13 PROCEDURE — G8420 CALC BMI NORM PARAMETERS: HCPCS | Performed by: INTERNAL MEDICINE

## 2024-09-13 PROCEDURE — G8428 CUR MEDS NOT DOCUMENT: HCPCS | Performed by: INTERNAL MEDICINE

## 2024-09-13 PROCEDURE — 1090F PRES/ABSN URINE INCON ASSESS: CPT | Performed by: INTERNAL MEDICINE

## 2024-09-13 PROCEDURE — 99204 OFFICE O/P NEW MOD 45 MIN: CPT | Performed by: INTERNAL MEDICINE

## 2024-09-13 PROCEDURE — 1036F TOBACCO NON-USER: CPT | Performed by: INTERNAL MEDICINE

## 2024-09-13 PROCEDURE — G2211 COMPLEX E/M VISIT ADD ON: HCPCS | Performed by: INTERNAL MEDICINE

## 2024-09-13 PROCEDURE — 1123F ACP DISCUSS/DSCN MKR DOCD: CPT | Performed by: INTERNAL MEDICINE

## 2024-09-13 NOTE — TELEPHONE ENCOUNTER
Patients daughter Paula is calling again to get a sooner appointment than 11/29/2024 with Dr. Barreto. She would like a call back ASA. Patient recently went to her PCP and from their she was told to go to the [place] ER due to feeling very fatigued and elevated heart rate. Patient also had an abnormal EKG. Patient was last seen by Dr. Barreto on 11/11/2022.     Paula(Patients daughter) phone # [phone]

## 2024-09-19 ENCOUNTER — OFFICE VISIT (OUTPATIENT)
Age: 89
End: 2024-09-19
Payer: MEDICARE

## 2024-09-19 VITALS
WEIGHT: 147 LBS | HEIGHT: 66 IN | SYSTOLIC BLOOD PRESSURE: 150 MMHG | OXYGEN SATURATION: 97 % | DIASTOLIC BLOOD PRESSURE: 60 MMHG | BODY MASS INDEX: 23.63 KG/M2 | HEART RATE: 98 BPM

## 2024-09-19 DIAGNOSIS — I34.2 NONRHEUMATIC MITRAL VALVE STENOSIS: ICD-10-CM

## 2024-09-19 DIAGNOSIS — R01.1 CARDIAC MURMUR, UNSPECIFIED: ICD-10-CM

## 2024-09-19 DIAGNOSIS — I34.2 NONRHEUMATIC MITRAL VALVE STENOSIS: Primary | ICD-10-CM

## 2024-09-19 DIAGNOSIS — R01.1 CARDIAC MURMUR, UNSPECIFIED: Primary | ICD-10-CM

## 2024-09-19 PROCEDURE — 1036F TOBACCO NON-USER: CPT | Performed by: INTERNAL MEDICINE

## 2024-09-19 PROCEDURE — G8428 CUR MEDS NOT DOCUMENT: HCPCS | Performed by: INTERNAL MEDICINE

## 2024-09-19 PROCEDURE — 99214 OFFICE O/P EST MOD 30 MIN: CPT | Performed by: INTERNAL MEDICINE

## 2024-09-19 PROCEDURE — G8420 CALC BMI NORM PARAMETERS: HCPCS | Performed by: INTERNAL MEDICINE

## 2024-09-19 PROCEDURE — 1090F PRES/ABSN URINE INCON ASSESS: CPT | Performed by: INTERNAL MEDICINE

## 2024-09-19 PROCEDURE — 1123F ACP DISCUSS/DSCN MKR DOCD: CPT | Performed by: INTERNAL MEDICINE

## 2024-09-19 RX ORDER — METOPROLOL SUCCINATE 25 MG/1
12.5 TABLET, EXTENDED RELEASE ORAL DAILY
COMMUNITY
End: 2024-09-19 | Stop reason: SDUPTHER

## 2024-09-19 RX ORDER — METOPROLOL SUCCINATE 25 MG/1
12.5 TABLET, EXTENDED RELEASE ORAL DAILY
Qty: 45 TABLET | Refills: 3 | Status: SHIPPED | OUTPATIENT
Start: 2024-09-19

## 2024-11-03 DIAGNOSIS — I10 PRIMARY HYPERTENSION: ICD-10-CM

## 2024-11-04 RX ORDER — AMLODIPINE BESYLATE 10 MG/1
TABLET ORAL
Qty: 90 TABLET | Refills: 0 | Status: SHIPPED | OUTPATIENT
Start: 2024-11-04

## 2024-11-04 RX ORDER — LOSARTAN POTASSIUM 100 MG/1
TABLET ORAL
Qty: 90 TABLET | Refills: 0 | Status: SHIPPED | OUTPATIENT
Start: 2024-11-04

## 2024-11-13 ENCOUNTER — ANCILLARY PROCEDURE (OUTPATIENT)
Age: 89
End: 2024-11-13
Payer: MEDICARE

## 2024-11-13 VITALS
WEIGHT: 147 LBS | HEART RATE: 69 BPM | SYSTOLIC BLOOD PRESSURE: 142 MMHG | HEIGHT: 66 IN | BODY MASS INDEX: 23.63 KG/M2 | DIASTOLIC BLOOD PRESSURE: 64 MMHG

## 2024-11-13 DIAGNOSIS — R01.1 CARDIAC MURMUR, UNSPECIFIED: ICD-10-CM

## 2024-11-13 DIAGNOSIS — C91.Z0 T-CELL LARGE GRANULAR LYMPHOCYTIC LEUKEMIA (HCC): ICD-10-CM

## 2024-11-13 DIAGNOSIS — I34.2 NONRHEUMATIC MITRAL VALVE STENOSIS: ICD-10-CM

## 2024-11-13 PROCEDURE — 93306 TTE W/DOPPLER COMPLETE: CPT | Performed by: INTERNAL MEDICINE

## 2024-11-14 LAB
ALBUMIN SERPL-MCNC: 3.6 G/DL (ref 3.5–5)
ALBUMIN/GLOB SERPL: 1.1 (ref 1.1–2.2)
ALP SERPL-CCNC: 88 U/L (ref 45–117)
ALT SERPL-CCNC: 20 U/L (ref 12–78)
ANION GAP SERPL CALC-SCNC: 6 MMOL/L (ref 2–12)
AST SERPL-CCNC: 24 U/L (ref 15–37)
BASOPHILS # BLD: 0 K/UL (ref 0–0.1)
BASOPHILS NFR BLD: 1 % (ref 0–1)
BILIRUB SERPL-MCNC: 0.5 MG/DL (ref 0.2–1)
BUN SERPL-MCNC: 13 MG/DL (ref 6–20)
BUN/CREAT SERPL: 18 (ref 12–20)
CALCIUM SERPL-MCNC: 9.9 MG/DL (ref 8.5–10.1)
CHLORIDE SERPL-SCNC: 101 MMOL/L (ref 97–108)
CO2 SERPL-SCNC: 28 MMOL/L (ref 21–32)
CREAT SERPL-MCNC: 0.73 MG/DL (ref 0.55–1.02)
DIFFERENTIAL METHOD BLD: ABNORMAL
ECHO AO ASC DIAM: 2.8 CM
ECHO AO ASCENDING AORTA INDEX: 1.6 CM/M2
ECHO AO ROOT DIAM: 3 CM
ECHO AO ROOT INDEX: 1.71 CM/M2
ECHO AV AREA PEAK VELOCITY: 1 CM2
ECHO AV AREA VTI: 1.3 CM2
ECHO AV AREA/BSA PEAK VELOCITY: 0.6 CM2/M2
ECHO AV AREA/BSA VTI: 0.7 CM2/M2
ECHO AV MEAN GRADIENT: 15 MMHG
ECHO AV MEAN VELOCITY: 1.8 M/S
ECHO AV PEAK GRADIENT: 44 MMHG
ECHO AV PEAK VELOCITY: 3.3 M/S
ECHO AV VELOCITY RATIO: 0.52
ECHO AV VTI: 55.6 CM
ECHO BSA: 1.76 M2
ECHO EST RA PRESSURE: 3 MMHG
ECHO LA DIAMETER INDEX: 2.4 CM/M2
ECHO LA DIAMETER: 4.2 CM
ECHO LA TO AORTIC ROOT RATIO: 1.4
ECHO LA VOL A-L A2C: 75 ML (ref 22–52)
ECHO LA VOL A-L A4C: 69 ML (ref 22–52)
ECHO LA VOL BP: 68 ML (ref 22–52)
ECHO LA VOL MOD A2C: 72 ML (ref 22–52)
ECHO LA VOL MOD A4C: 63 ML (ref 22–52)
ECHO LA VOL/BSA BIPLANE: 39 ML/M2 (ref 16–34)
ECHO LA VOLUME AREA LENGTH: 73 ML
ECHO LA VOLUME INDEX A-L A2C: 43 ML/M2 (ref 16–34)
ECHO LA VOLUME INDEX A-L A4C: 39 ML/M2 (ref 16–34)
ECHO LA VOLUME INDEX AREA LENGTH: 42 ML/M2 (ref 16–34)
ECHO LA VOLUME INDEX MOD A2C: 41 ML/M2 (ref 16–34)
ECHO LA VOLUME INDEX MOD A4C: 36 ML/M2 (ref 16–34)
ECHO LV E' LATERAL VELOCITY: 5.8 CM/S
ECHO LV E' SEPTAL VELOCITY: 4.2 CM/S
ECHO LV EDV A2C: 52 ML
ECHO LV EDV A4C: 72 ML
ECHO LV EDV BP: 63 ML (ref 56–104)
ECHO LV EDV INDEX A4C: 41 ML/M2
ECHO LV EDV INDEX BP: 36 ML/M2
ECHO LV EDV NDEX A2C: 30 ML/M2
ECHO LV EJECTION FRACTION A2C: 66 %
ECHO LV EJECTION FRACTION A4C: 62 %
ECHO LV EJECTION FRACTION BIPLANE: 65 % (ref 55–100)
ECHO LV ESV A2C: 17 ML
ECHO LV ESV A4C: 28 ML
ECHO LV ESV BP: 22 ML (ref 19–49)
ECHO LV ESV INDEX A2C: 10 ML/M2
ECHO LV ESV INDEX A4C: 16 ML/M2
ECHO LV ESV INDEX BP: 13 ML/M2
ECHO LV FRACTIONAL SHORTENING: 33 % (ref 28–44)
ECHO LV INTERNAL DIMENSION DIASTOLE INDEX: 2.06 CM/M2
ECHO LV INTERNAL DIMENSION DIASTOLIC: 3.6 CM (ref 3.9–5.3)
ECHO LV INTERNAL DIMENSION SYSTOLIC INDEX: 1.37 CM/M2
ECHO LV INTERNAL DIMENSION SYSTOLIC: 2.4 CM
ECHO LV IVSD: 1.3 CM (ref 0.6–0.9)
ECHO LV MASS 2D: 150.6 G (ref 67–162)
ECHO LV MASS INDEX 2D: 86.1 G/M2 (ref 43–95)
ECHO LV POSTERIOR WALL DIASTOLIC: 1.2 CM (ref 0.6–0.9)
ECHO LV RELATIVE WALL THICKNESS RATIO: 0.67
ECHO LVOT AREA: 2 CM2
ECHO LVOT AV VTI INDEX: 0.65
ECHO LVOT DIAM: 1.6 CM
ECHO LVOT MEAN GRADIENT: 7 MMHG
ECHO LVOT PEAK GRADIENT: 12 MMHG
ECHO LVOT PEAK VELOCITY: 1.7 M/S
ECHO LVOT STROKE VOLUME INDEX: 41.6 ML/M2
ECHO LVOT SV: 72.7 ML
ECHO LVOT VTI: 36.2 CM
ECHO MV A VELOCITY: 1.99 M/S
ECHO MV AREA PHT: 2 CM2
ECHO MV AREA VTI: 1.1 CM2
ECHO MV E DECELERATION TIME (DT): 386 MS
ECHO MV E VELOCITY: 1.5 M/S
ECHO MV E/A RATIO: 0.75
ECHO MV E/E' LATERAL: 25.86
ECHO MV E/E' RATIO (AVERAGED): 30.79
ECHO MV E/E' SEPTAL: 35.71
ECHO MV LVOT VTI INDEX: 1.77
ECHO MV MAX VELOCITY: 2 M/S
ECHO MV MEAN GRADIENT: 7 MMHG
ECHO MV MEAN VELOCITY: 1.2 M/S
ECHO MV PEAK GRADIENT: 16 MMHG
ECHO MV PRESSURE HALF TIME (PHT): 111.9 MS
ECHO MV VTI: 64.2 CM
ECHO RIGHT VENTRICULAR SYSTOLIC PRESSURE (RVSP): 42 MMHG
ECHO RV FREE WALL PEAK S': 15.4 CM/S
ECHO RV INTERNAL DIMENSION: 4.3 CM
ECHO RV TAPSE: 3 CM (ref 1.7–?)
ECHO TV REGURGITANT MAX VELOCITY: 3.11 M/S
ECHO TV REGURGITANT PEAK GRADIENT: 39 MMHG
EOSINOPHIL # BLD: 0 K/UL (ref 0–0.4)
EOSINOPHIL NFR BLD: 1 % (ref 0–7)
ERYTHROCYTE [DISTWIDTH] IN BLOOD BY AUTOMATED COUNT: 14.3 % (ref 11.5–14.5)
GLOBULIN SER CALC-MCNC: 3.4 G/DL (ref 2–4)
GLUCOSE SERPL-MCNC: 104 MG/DL (ref 65–100)
HCT VFR BLD AUTO: 36.7 % (ref 35–47)
HGB BLD-MCNC: 12.1 G/DL (ref 11.5–16)
IMM GRANULOCYTES # BLD AUTO: 0 K/UL (ref 0–0.04)
IMM GRANULOCYTES NFR BLD AUTO: 1 % (ref 0–0.5)
LDH SERPL L TO P-CCNC: 203 U/L (ref 81–246)
LYMPHOCYTES # BLD: 2.4 K/UL (ref 0.8–3.5)
LYMPHOCYTES NFR BLD: 64 % (ref 12–49)
MCH RBC QN AUTO: 27.6 PG (ref 26–34)
MCHC RBC AUTO-ENTMCNC: 33 G/DL (ref 30–36.5)
MCV RBC AUTO: 83.6 FL (ref 80–99)
MONOCYTES # BLD: 0.3 K/UL (ref 0–1)
MONOCYTES NFR BLD: 9 % (ref 5–13)
NEUTS SEG # BLD: 0.8 K/UL (ref 1.8–8)
NEUTS SEG NFR BLD: 24 % (ref 32–75)
NRBC # BLD: 0 K/UL (ref 0–0.01)
NRBC BLD-RTO: 0 PER 100 WBC
PLATELET # BLD AUTO: 187 K/UL (ref 150–400)
PMV BLD AUTO: 9.2 FL (ref 8.9–12.9)
POTASSIUM SERPL-SCNC: 4.6 MMOL/L (ref 3.5–5.1)
PROT SERPL-MCNC: 7 G/DL (ref 6.4–8.2)
RBC # BLD AUTO: 4.39 M/UL (ref 3.8–5.2)
RBC MORPH BLD: ABNORMAL
SODIUM SERPL-SCNC: 135 MMOL/L (ref 136–145)
WBC # BLD AUTO: 3.5 K/UL (ref 3.6–11)

## 2024-11-19 DIAGNOSIS — I34.2 NONRHEUMATIC MITRAL VALVE STENOSIS: Primary | ICD-10-CM

## 2024-11-19 DIAGNOSIS — R01.1 CARDIAC MURMUR, UNSPECIFIED: ICD-10-CM

## 2024-12-06 ENCOUNTER — TELEPHONE (OUTPATIENT)
Age: 88
End: 2024-12-06

## 2024-12-06 NOTE — TELEPHONE ENCOUNTER
Called pt daughter and LVM to reschedule todays appt for within the next month as Dr Ritter is out of office

## 2025-01-28 RX ORDER — LOSARTAN POTASSIUM 100 MG/1
TABLET ORAL
Qty: 90 TABLET | Refills: 0 | Status: SHIPPED | OUTPATIENT
Start: 2025-01-28

## 2025-02-09 DIAGNOSIS — I10 PRIMARY HYPERTENSION: ICD-10-CM

## 2025-02-10 RX ORDER — AMLODIPINE BESYLATE 10 MG/1
TABLET ORAL
Qty: 90 TABLET | Refills: 0 | Status: SHIPPED | OUTPATIENT
Start: 2025-02-10

## 2025-04-28 RX ORDER — LOSARTAN POTASSIUM 100 MG/1
100 TABLET ORAL EVERY MORNING
Qty: 90 TABLET | Refills: 3 | Status: SHIPPED | OUTPATIENT
Start: 2025-04-28

## 2025-05-12 DIAGNOSIS — I10 PRIMARY HYPERTENSION: ICD-10-CM

## 2025-05-12 RX ORDER — AMLODIPINE BESYLATE 10 MG/1
10 TABLET ORAL DAILY
Qty: 90 TABLET | Refills: 0 | Status: SHIPPED | OUTPATIENT
Start: 2025-05-12

## 2025-05-16 ENCOUNTER — HOSPITAL ENCOUNTER (EMERGENCY)
Facility: HOSPITAL | Age: 89
Discharge: HOME | End: 2025-05-16
Attending: EMERGENCY MEDICINE
Payer: MEDICARE

## 2025-05-16 ENCOUNTER — APPOINTMENT (OUTPATIENT)
Facility: HOSPITAL | Age: 89
End: 2025-05-16
Payer: MEDICARE

## 2025-05-16 VITALS
HEART RATE: 94 BPM | RESPIRATION RATE: 15 BRPM | OXYGEN SATURATION: 99 % | HEIGHT: 66 IN | SYSTOLIC BLOOD PRESSURE: 184 MMHG | BODY MASS INDEX: 22.98 KG/M2 | TEMPERATURE: 97.9 F | WEIGHT: 143 LBS | DIASTOLIC BLOOD PRESSURE: 68 MMHG

## 2025-05-16 DIAGNOSIS — M25.562 ACUTE PAIN OF LEFT KNEE: Primary | ICD-10-CM
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
## 2025-05-16 LAB
ALBUMIN SERPL-MCNC: 3.9 G/DL (ref 3.5–5)
ALBUMIN/GLOB SERPL: 1 (ref 1.1–2.2)
ALP SERPL-CCNC: 107 U/L (ref 45–117)
ALT SERPL-CCNC: 26 U/L (ref 12–78)
ANION GAP SERPL CALC-SCNC: 8 MMOL/L (ref 2–12)
AST SERPL-CCNC: 27 U/L (ref 15–37)
BASOPHILS # BLD: 0 K/UL (ref 0–0.1)
BASOPHILS NFR BLD: 0 % (ref 0–1)
BILIRUB SERPL-MCNC: 0.7 MG/DL (ref 0.2–1)
BUN SERPL-MCNC: 11 MG/DL (ref 6–20)
BUN/CREAT SERPL: 13 (ref 12–20)
CALCIUM SERPL-MCNC: 10.3 MG/DL (ref 8.5–10.1)
CHLORIDE SERPL-SCNC: 104 MMOL/L (ref 97–108)
CO2 SERPL-SCNC: 28 MMOL/L (ref 21–32)
CREAT SERPL-MCNC: 0.88 MG/DL (ref 0.55–1.02)
DIFFERENTIAL METHOD BLD: ABNORMAL
EKG ATRIAL RATE: 92 BPM
EKG DIAGNOSIS: NORMAL
EKG P AXIS: 53 DEGREES
EKG P-R INTERVAL: 178 MS
EKG Q-T INTERVAL: 394 MS
EKG QRS DURATION: 132 MS
EKG QTC CALCULATION (BAZETT): 487 MS
EKG R AXIS: -35 DEGREES
EKG T AXIS: 23 DEGREES
EKG VENTRICULAR RATE: 92 BPM
EOSINOPHIL # BLD: 0 K/UL (ref 0–0.4)
EOSINOPHIL NFR BLD: 0 % (ref 0–7)
ERYTHROCYTE [DISTWIDTH] IN BLOOD BY AUTOMATED COUNT: 13.9 % (ref 11.5–14.5)
GLOBULIN SER CALC-MCNC: 3.9 G/DL (ref 2–4)
GLUCOSE SERPL-MCNC: 117 MG/DL (ref 65–100)
HCT VFR BLD AUTO: 43.1 % (ref 35–47)
HGB BLD-MCNC: 14.2 G/DL (ref 11.5–16)
IMM GRANULOCYTES # BLD AUTO: 0 K/UL
IMM GRANULOCYTES NFR BLD AUTO: 0 %
LYMPHOCYTES # BLD: 1.55 K/UL (ref 0.8–3.5)
LYMPHOCYTES NFR BLD: 42 % (ref 12–49)
MCH RBC QN AUTO: 28 PG (ref 26–34)
MCHC RBC AUTO-ENTMCNC: 32.9 G/DL (ref 30–36.5)
MCV RBC AUTO: 85 FL (ref 80–99)
MONOCYTES # BLD: 0.19 K/UL (ref 0–1)
MONOCYTES NFR BLD: 5 % (ref 5–13)
NEUTS BAND NFR BLD MANUAL: 2 % (ref 0–6)
NEUTS SEG # BLD: 1.96 K/UL (ref 1.8–8)
NEUTS SEG NFR BLD: 51 % (ref 32–75)
NRBC # BLD: 0 K/UL (ref 0–0.01)
NRBC BLD-RTO: 0 PER 100 WBC
PLATELET # BLD AUTO: 163 K/UL (ref 150–400)
PMV BLD AUTO: 8.8 FL (ref 8.9–12.9)
POTASSIUM SERPL-SCNC: 4 MMOL/L (ref 3.5–5.1)
PROT SERPL-MCNC: 7.8 G/DL (ref 6.4–8.2)
RBC # BLD AUTO: 5.07 M/UL (ref 3.8–5.2)
RBC MORPH BLD: ABNORMAL
SODIUM SERPL-SCNC: 140 MMOL/L (ref 136–145)
TROPONIN I SERPL HS-MCNC: 62 NG/L (ref 0–51)
TROPONIN I SERPL HS-MCNC: 65 NG/L (ref 0–51)
WBC # BLD AUTO: 3.7 K/UL (ref 3.6–11)

## 2025-05-16 PROCEDURE — 70450 CT HEAD/BRAIN W/O DYE: CPT

## 2025-05-16 PROCEDURE — 93005 ELECTROCARDIOGRAM TRACING: CPT | Performed by: STUDENT IN AN ORGANIZED HEALTH CARE EDUCATION/TRAINING PROGRAM

## 2025-05-16 PROCEDURE — 80053 COMPREHEN METABOLIC PANEL: CPT

## 2025-05-16 PROCEDURE — 85025 COMPLETE CBC W/AUTO DIFF WBC: CPT

## 2025-05-16 PROCEDURE — 99285 EMERGENCY DEPT VISIT HI MDM: CPT

## 2025-05-16 PROCEDURE — 36415 COLL VENOUS BLD VENIPUNCTURE: CPT

## 2025-05-16 PROCEDURE — 73562 X-RAY EXAM OF KNEE 3: CPT

## 2025-05-16 PROCEDURE — 84484 ASSAY OF TROPONIN QUANT: CPT

## 2025-05-16 PROCEDURE — 93005 ELECTROCARDIOGRAM TRACING: CPT | Performed by: EMERGENCY MEDICINE

## 2025-05-16 NOTE — ED NOTES
Pt ambulated in room with rollator successfully. Pt reported that the weightbearing pain was better with the wrap in place. Pt ambulated with rollator to the bathroom. Daughter stayed in bathroom with patient.

## 2025-05-16 NOTE — PROGRESS NOTES
Spiritual Health History and Assessment/Progress Note  [facility]    Initial Encounter,  ,  ,      Name: Karyn Richardson MRN: [id number]    Age: 89 y.o.     Sex: female   Language: English   Mandaen: Latter-day   <principal problem not specified>     Date: 5/16/2025            Total Time Calculated: 10 min              Spiritual Assessment began in [place] EMERGENCY DEPARTMENT        Referral/Consult From: Nurse   Encounter Overview/Reason: Initial Encounter  Service Provided For: Patient, Family    Jyoti, Belief, Meaning:   Patient is connected with a jyoti tradition or spiritual practice  Family/Friends are connected with a jyoti tradition or spiritual practice      Importance and Influence:  Patient has spiritual/personal beliefs that influence decisions regarding their health  Family/Friends have spiritual/personal beliefs that influence decisions regarding the patient's health    Community:  Patient feels well-supported. Support system includes: Children and Extended family  Family/Friends feel well-supported. Support system includes: Parent/s, Children, and Extended family    Assessment and Plan of Care:   Patient Interventions include: Facilitated expression of thoughts and feelings and Provided sacramental/Advent ritual  Family/Friends Interventions include: Facilitated expression of thoughts and feelings and Provided sacramental/Advent ritual    Patient Plan of Care: Spiritual Care available upon further referral  Family/Friends Plan of Care: Spiritual Care available upon further referral    Chart review. Checked in with nurse prior to visitation. Met and conversed with patient. Patient is a born again Mu-ism. Patient's Restorationist is important to her and influences her morals, values, and ethics. Patient has daughter present at bedside. Patient communicated that her jyoti helps get her through difficult times. Patient had a fall and injured her knee. Patient shared why she was present

## 2025-05-16 NOTE — ED PROVIDER NOTES
[facility]  EMERGENCY DEPARTMENT ENCOUNTER      Pt Name: Karyn Richardson  MRN: [id number]  Birthdate 3/30/1931  Date of evaluation: 5/16/2025  Provider: Cuba Wilkinson DO    CHIEF COMPLAINT       Chief Complaint   Patient presents with    syncope    Fall    Leg Pain         HISTORY OF PRESENT ILLNESS   (Location/Symptom, Timing/Onset, Context/Setting, Quality, Duration, Modifying Factors, Severity)  Note limiting factors.   89-year-old female comes in after a fall.  Patient believes she had a syncopal/near syncopal episode in her 4-year.  She states that she does not remember falling.  She complains of left medial knee pain with weightbearing.  She denies head neck or back pain.  She denies other complaints.  She is having syncopal episodes in the past.  She denies tripping on anything.            Review of External Medical Records:     Nursing Notes were reviewed.    REVIEW OF SYSTEMS    (2-9 systems for level 4, 10 or more for level 5)     Review of Systems    Except as noted above the remainder of the review of systems was reviewed and negative.       PAST MEDICAL HISTORY     Past Medical History:   Diagnosis Date    Anemia     Sees Dr. Chino De La Cruz and is on a prescription multivitamin call Multigen which has corrected her anemia    Dry eye     Headache     Hypertension     Intracranial hypotension 1998    had blood patch to correct    Meningitis 1950 & 2011    Osteochondritis 1975    Snoring          SURGICAL HISTORY       Past Surgical History:   Procedure Laterality Date    CATARACT REMOVAL  1997    DILATION AND CURETTAGE OF UTERUS  1972    IR KYPHOPLASTY LUMBAR 1 VERTEBRAL BODY  9/30/2022    IR KYPHOPLASTY LUMBAR FIRST LEVEL 9/30/2022 [facility]    IR KYPHOPLASTY LUMBAR 1 VERTEBRAL BODY  9/30/2022    OTHER SURGICAL HISTORY  1998    blood patch for intercranial spontaneious hypotension     OTHER SURGICAL HISTORY  1975    osteochroditis         CURRENT MEDICATIONS    Session: 0 min   Housing Stability: Unknown (11/8/2023)    Housing Stability Vital Sign     Unstable Housing in the Last Year: No           PHYSICAL EXAM    (up to 7 for level 4, 8 or more for level 5)     ED Triage Vitals [05/16/25 1310]   BP Systolic BP Percentile Diastolic BP Percentile Temp Temp Source Pulse Respirations SpO2   (!) 206/78 -- -- 97.9 °F (36.6 °C) Tympanic (!) 108 20 95 %      Height Weight - Scale         1.676 m (5' 6\") 64.9 kg (143 lb)             Body mass index is 23.08 kg/m².    Physical Exam  Vitals and nursing note reviewed.   Constitutional:       Appearance: Normal appearance.   HENT:      Head: Normocephalic and atraumatic.   Cardiovascular:      Rate and Rhythm: Normal rate and regular rhythm.      Heart sounds: Murmur heard.   Pulmonary:      Effort: Pulmonary effort is normal.      Breath sounds: Normal breath sounds.   Abdominal:      Tenderness: There is no abdominal tenderness.   Musculoskeletal:         General: No swelling, tenderness or deformity. Normal range of motion.      Comments: Gross exam of the left knee is unremarkable for traumatic findings   Neurological:      Mental Status: She is alert and oriented to person, place, and time.   Psychiatric:         Mood and Affect: Mood normal.         DIAGNOSTIC RESULTS     EKG: All EKG's are interpreted by the Emergency Department Physician who either signs or Co-signs this chart in the absence of a cardiologist.        RADIOLOGY:   Non-plain film images such as CT, Ultrasound and MRI are read by the radiologist. Plain radiographic images are visualized and preliminarily interpreted by the emergency physician with the below findings:        Interpretation per the Radiologist below, if available at the time of this note:    XR KNEE LEFT (3 VIEWS)   Final Result   No acute bony abnormalities.   Small joint effusion.            Electronically signed by LEATHA Sánchez      CT HEAD WO CONTRAST   Final Result   No acute changes.

## 2025-05-16 NOTE — ED NOTES
ED SIGN OUT NOTE  Care assumed at [facility] 2:39 PM EDT    Patient was signed out to me by Dr. Wilkinson.     Briefly, pt with fall today, pt initially reported passing out walking in her house today, however on further questioning pt does not believe she passed out, pt reports knee pain with walking otherwise without complaints.  CT head and xray knee negative.     Patient is awaiting Labs.    BP (!) 184/68   Pulse 98   Temp 97.9 °F (36.6 °C) (Tympanic)   Resp 11   Ht 1.676 m (5' 6\")   Wt 64.9 kg (143 lb)   SpO2 99%   BMI 23.08 kg/m²     Labs Reviewed   CBC WITH AUTO DIFFERENTIAL - Abnormal; Notable for the following components:       Result Value    MPV 8.8 (*)     All other components within normal limits   COMPREHENSIVE METABOLIC PANEL - Abnormal; Notable for the following components:    Glucose 117 (*)     Calcium 10.3 (*)     Albumin/Globulin Ratio 1.0 (*)     All other components within normal limits   TROPONIN - Abnormal; Notable for the following components:    Troponin, High Sensitivity 62 (*)     All other components within normal limits   TROPONIN - Abnormal; Notable for the following components:    Troponin, High Sensitivity 65 (*)     All other components within normal limits     XR KNEE LEFT (3 VIEWS)   Final Result   No acute bony abnormalities.   Small joint effusion.            Electronically signed by LEATHA Sánchez      CT HEAD WO CONTRAST   Final Result   No acute changes.      Electronically signed by Jackson Mendoza MD          ED Course as of 05/16/25 1828   Fri May 16, 2025   1334 EKG 1327  Independent evaluation shows normal sinus rhythm at 92 bpm with a left axis deviation.  Right bundle branch block pattern.  No STEMI [GG]   1504 WBC: 3.7 [SL]   1504 Hemoglobin Quant: 14.2 [SL]   1510 XR KNEE LEFT (3 VIEWS)  No acute bony abnormalities. [SL]   1510 CT HEAD WO CONTRAST  No acute changes. [SL]   1550 Sodium: 140 [SL]   1550 Potassium: 4.0 [SL]   1550 Chloride: 104 [SL]   1550

## 2025-05-16 NOTE — ED TRIAGE NOTES
Pt to room via wheelchair. Pt states she was walking in the foyer and she had a syncopal episode causing her to fall. Pt here with left medial knee and leg pain with weightbearing. Denies any head or neck pain. Pt states she has had syncopal episodes before but has not been seen for the syncope.

## 2025-05-19 LAB
EKG ATRIAL RATE: 101 BPM
EKG ATRIAL RATE: 92 BPM
EKG DIAGNOSIS: NORMAL
EKG DIAGNOSIS: NORMAL
EKG P AXIS: 51 DEGREES
EKG P AXIS: 53 DEGREES
EKG P-R INTERVAL: 178 MS
EKG P-R INTERVAL: 184 MS
EKG Q-T INTERVAL: 394 MS
EKG Q-T INTERVAL: 394 MS
EKG QRS DURATION: 128 MS
EKG QRS DURATION: 132 MS
EKG QTC CALCULATION (BAZETT): 487 MS
EKG QTC CALCULATION (BAZETT): 510 MS
EKG R AXIS: -35 DEGREES
EKG R AXIS: -84 DEGREES
EKG T AXIS: 23 DEGREES
EKG T AXIS: 27 DEGREES
EKG VENTRICULAR RATE: 101 BPM
EKG VENTRICULAR RATE: 92 BPM

## 2025-05-19 PROCEDURE — 93010 ELECTROCARDIOGRAM REPORT: CPT | Performed by: SPECIALIST

## 2025-06-06 SDOH — ECONOMIC STABILITY: INCOME INSECURITY: IN THE LAST 12 MONTHS, WAS THERE A TIME WHEN YOU WERE NOT ABLE TO PAY THE MORTGAGE OR RENT ON TIME?: NO

## 2025-06-06 SDOH — ECONOMIC STABILITY: FOOD INSECURITY: WITHIN THE PAST 12 MONTHS, THE FOOD YOU BOUGHT JUST DIDN'T LAST AND YOU DIDN'T HAVE MONEY TO GET MORE.: NEVER TRUE

## 2025-06-06 SDOH — ECONOMIC STABILITY: TRANSPORTATION INSECURITY
IN THE PAST 12 MONTHS, HAS LACK OF TRANSPORTATION KEPT YOU FROM MEETINGS, WORK, OR FROM GETTING THINGS NEEDED FOR DAILY LIVING?: NO

## 2025-06-06 SDOH — ECONOMIC STABILITY: TRANSPORTATION INSECURITY
IN THE PAST 12 MONTHS, HAS THE LACK OF TRANSPORTATION KEPT YOU FROM MEDICAL APPOINTMENTS OR FROM GETTING MEDICATIONS?: NO

## 2025-06-06 SDOH — ECONOMIC STABILITY: FOOD INSECURITY: WITHIN THE PAST 12 MONTHS, YOU WORRIED THAT YOUR FOOD WOULD RUN OUT BEFORE YOU GOT MONEY TO BUY MORE.: NEVER TRUE

## 2025-06-06 SDOH — HEALTH STABILITY: PHYSICAL HEALTH: ON AVERAGE, HOW MANY DAYS PER WEEK DO YOU ENGAGE IN MODERATE TO STRENUOUS EXERCISE (LIKE A BRISK WALK)?: 0 DAYS

## 2025-06-06 ASSESSMENT — PATIENT HEALTH QUESTIONNAIRE - PHQ9
2. FEELING DOWN, DEPRESSED OR HOPELESS: SEVERAL DAYS
SUM OF ALL RESPONSES TO PHQ QUESTIONS 1-9: 2
1. LITTLE INTEREST OR PLEASURE IN DOING THINGS: SEVERAL DAYS
SUM OF ALL RESPONSES TO PHQ QUESTIONS 1-9: 2

## 2025-06-06 ASSESSMENT — LIFESTYLE VARIABLES
HOW OFTEN DO YOU HAVE SIX OR MORE DRINKS ON ONE OCCASION: 1
HOW OFTEN DO YOU HAVE A DRINK CONTAINING ALCOHOL: 2
HOW MANY STANDARD DRINKS CONTAINING ALCOHOL DO YOU HAVE ON A TYPICAL DAY: 1
HOW OFTEN DO YOU HAVE A DRINK CONTAINING ALCOHOL: MONTHLY OR LESS
HOW MANY STANDARD DRINKS CONTAINING ALCOHOL DO YOU HAVE ON A TYPICAL DAY: 1 OR 2

## 2025-06-09 ENCOUNTER — OFFICE VISIT (OUTPATIENT)
Age: 89
End: 2025-06-09
Payer: MEDICARE

## 2025-06-09 VITALS
BODY MASS INDEX: 23.72 KG/M2 | RESPIRATION RATE: 16 BRPM | SYSTOLIC BLOOD PRESSURE: 138 MMHG | HEIGHT: 66 IN | HEART RATE: 78 BPM | OXYGEN SATURATION: 97 % | WEIGHT: 147.6 LBS | DIASTOLIC BLOOD PRESSURE: 62 MMHG | TEMPERATURE: 98.2 F

## 2025-06-09 DIAGNOSIS — C91.Z0 T-CELL LARGE GRANULAR LYMPHOCYTIC LEUKEMIA (HCC): ICD-10-CM

## 2025-06-09 DIAGNOSIS — Z00.00 MEDICARE ANNUAL WELLNESS VISIT, SUBSEQUENT: Primary | ICD-10-CM

## 2025-06-09 DIAGNOSIS — D50.9 IRON DEFICIENCY ANEMIA, UNSPECIFIED IRON DEFICIENCY ANEMIA TYPE: ICD-10-CM

## 2025-06-09 PROCEDURE — G0439 PPPS, SUBSEQ VISIT: HCPCS | Performed by: INTERNAL MEDICINE

## 2025-06-09 PROCEDURE — 99213 OFFICE O/P EST LOW 20 MIN: CPT | Performed by: INTERNAL MEDICINE

## 2025-06-09 PROCEDURE — 1160F RVW MEDS BY RX/DR IN RCRD: CPT | Performed by: INTERNAL MEDICINE

## 2025-06-09 PROCEDURE — G8420 CALC BMI NORM PARAMETERS: HCPCS | Performed by: INTERNAL MEDICINE

## 2025-06-09 PROCEDURE — 1123F ACP DISCUSS/DSCN MKR DOCD: CPT | Performed by: INTERNAL MEDICINE

## 2025-06-09 PROCEDURE — 1159F MED LIST DOCD IN RCRD: CPT | Performed by: INTERNAL MEDICINE

## 2025-06-09 PROCEDURE — 1036F TOBACCO NON-USER: CPT | Performed by: INTERNAL MEDICINE

## 2025-06-09 PROCEDURE — G8427 DOCREV CUR MEDS BY ELIG CLIN: HCPCS | Performed by: INTERNAL MEDICINE

## 2025-06-09 PROCEDURE — 1090F PRES/ABSN URINE INCON ASSESS: CPT | Performed by: INTERNAL MEDICINE

## 2025-06-09 RX ORDER — IRON ASPGLY/C/B12/FA/CA-TH/SUC 70-150-1MG
1 TABLET ORAL DAILY
Qty: 90 TABLET | Refills: 0 | Status: SHIPPED | OUTPATIENT
Start: 2025-06-09

## 2025-06-09 RX ORDER — METOPROLOL SUCCINATE 25 MG/1
12.5 TABLET, EXTENDED RELEASE ORAL DAILY
Qty: 15 TABLET | Refills: 5
Start: 2025-06-09

## 2025-06-09 ASSESSMENT — PATIENT HEALTH QUESTIONNAIRE - PHQ9
8. MOVING OR SPEAKING SO SLOWLY THAT OTHER PEOPLE COULD HAVE NOTICED. OR THE OPPOSITE, BEING SO FIGETY OR RESTLESS THAT YOU HAVE BEEN MOVING AROUND A LOT MORE THAN USUAL: NOT AT ALL
3. TROUBLE FALLING OR STAYING ASLEEP: MORE THAN HALF THE DAYS
4. FEELING TIRED OR HAVING LITTLE ENERGY: MORE THAN HALF THE DAYS
1. LITTLE INTEREST OR PLEASURE IN DOING THINGS: SEVERAL DAYS
SUM OF ALL RESPONSES TO PHQ QUESTIONS 1-9: 6
8. MOVING OR SPEAKING SO SLOWLY THAT OTHER PEOPLE COULD HAVE NOTICED. OR THE OPPOSITE - BEING SO FIDGETY OR RESTLESS THAT YOU HAVE BEEN MOVING AROUND A LOT MORE THAN USUAL: NOT AT ALL
10. IF YOU CHECKED OFF ANY PROBLEMS, HOW DIFFICULT HAVE THESE PROBLEMS MADE IT FOR YOU TO DO YOUR WORK, TAKE CARE OF THINGS AT HOME, OR GET ALONG WITH OTHER PEOPLE: SOMEWHAT DIFFICULT
2. FEELING DOWN, DEPRESSED OR HOPELESS: SEVERAL DAYS
6. FEELING BAD ABOUT YOURSELF - OR THAT YOU ARE A FAILURE OR HAVE LET YOURSELF OR YOUR FAMILY DOWN: NOT AT ALL
10. IF YOU CHECKED OFF ANY PROBLEMS, HOW DIFFICULT HAVE THESE PROBLEMS MADE IT FOR YOU TO DO YOUR WORK, TAKE CARE OF THINGS AT HOME, OR GET ALONG WITH OTHER PEOPLE: SOMEWHAT DIFFICULT
6. FEELING BAD ABOUT YOURSELF - OR THAT YOU ARE A FAILURE OR HAVE LET YOURSELF OR YOUR FAMILY DOWN: NOT AT ALL
SUM OF ALL RESPONSES TO PHQ QUESTIONS 1-9: 6
SUM OF ALL RESPONSES TO PHQ QUESTIONS 1-9: 6
3. TROUBLE FALLING OR STAYING ASLEEP: MORE THAN HALF THE DAYS
2. FEELING DOWN, DEPRESSED OR HOPELESS: SEVERAL DAYS
7. TROUBLE CONCENTRATING ON THINGS, SUCH AS READING THE NEWSPAPER OR WATCHING TELEVISION: NOT AT ALL
9. THOUGHTS THAT YOU WOULD BE BETTER OFF DEAD, OR OF HURTING YOURSELF: NOT AT ALL
SUM OF ALL RESPONSES TO PHQ QUESTIONS 1-9: 6
4. FEELING TIRED OR HAVING LITTLE ENERGY: MORE THAN HALF THE DAYS
5. POOR APPETITE OR OVEREATING: NOT AT ALL
9. THOUGHTS THAT YOU WOULD BE BETTER OFF DEAD, OR OF HURTING YOURSELF: NOT AT ALL
1. LITTLE INTEREST OR PLEASURE IN DOING THINGS: SEVERAL DAYS
SUM OF ALL RESPONSES TO PHQ QUESTIONS 1-9: 6
7. TROUBLE CONCENTRATING ON THINGS, SUCH AS READING THE NEWSPAPER OR WATCHING TELEVISION: NOT AT ALL
5. POOR APPETITE OR OVEREATING: NOT AT ALL

## 2025-06-09 NOTE — PATIENT INSTRUCTIONS

## 2025-06-09 NOTE — PROGRESS NOTES
Identified pt with two pt identifiers(name and )    Chief Complaint   Patient presents with    Medicare AWV        Health Maintenance Due   Topic    COVID-19 Vaccine (1)    DTaP/Tdap/Td vaccine (1 - Tdap)    Shingles vaccine (1 of 2)    Respiratory Syncytial Virus (RSV) Pregnant or age 60 yrs+ (1 - 1-dose 75+ series)    Pneumococcal 50+ years Vaccine (3 of 3 - PCV)    Annual Wellness Visit (Medicare)        Wt Readings from Last 3 Encounters:   25 67 kg (147 lb 9.6 oz)   25 64.9 kg (143 lb)   24 66.7 kg (147 lb)     Temp Readings from Last 3 Encounters:   25 98.2 °F (36.8 °C) (Temporal)   25 97.9 °F (36.6 °C) (Tympanic)   24 97.8 °F (36.6 °C) (Oral)     BP Readings from Last 3 Encounters:   25 (!) 148/62   25 (!) 184/68   24 (!) 142/64     Pulse Readings from Last 3 Encounters:   25 78   25 94   24 69           Depression Screening:  :         2025     2:24 PM 2025     9:33 AM 2024     3:49 PM 2024    10:12 AM 2024    11:55 AM 2023     3:35 PM 2023     3:00 PM   PHQ-9 Questionaire   Little interest or pleasure in doing things 1 1 0 0 0 0 0   Feeling down, depressed, or hopeless 1 1 0 0 0 0 0   Trouble falling or staying asleep, or sleeping too much 2         Feeling tired or having little energy 2         Poor appetite or overeating 0         Feeling bad about yourself - or that you are a failure or have let yourself or your family down 0         Trouble concentrating on things, such as reading the newspaper or watching television 0         Moving or speaking so slowly that other people could have noticed. Or the opposite - being so fidgety or restless that you have been moving around a lot more than usual 0         Thoughts that you would be better off dead, or of hurting yourself in some way 0         PHQ-9 Total Score 6  2  0 0 0 0 0   If you checked off any problems, how difficult have these problems made

## 2025-06-09 NOTE — PROGRESS NOTES
Medicare Annual Wellness Visit    Karyn Richardson is here for Medicare AWV    Assessment & Plan   Medicare annual wellness visit, subsequent  Anticipatory guidance discussed.   Immunizations reviewed  HM updated.   T-cell large granular lymphocytic leukemia (HCC)  Assessment & Plan:   Monitored by specialist- no acute findings meriting change in the plan  Iron deficiency anemia, unspecified iron deficiency anemia type  -     iron aspGly-vitC-B12-FA-Ca-suc (MULTIGEN FOLIC) -2-1 MG TABS caplet; Take 1 tablet by mouth daily, Disp-90 tablet, R-0Normal    Patient is doing fairly well. Note review of the work up included a CT scan of her brain and no evidence of acute findings.   She has declined bone density and mammogram at this time.   She also declined any further labs since they were just done in the ER recently.     We reviewed her HM and updated. She is here with her daughter.     I have discussed the diagnosis with the patient and the intended treatment plan as seen in the above orders. The patient has received an after-visit summary and questions were answered concerning future plans. Asked to return should symptoms worsen or not improve with treatment. Any pending labs and studies will be relayed to patient when they become available.     Pt verbalizes understanding of plan of care and denies further questions or concerns at this time.      Return in 1 year (on 6/9/2026), or if symptoms worsen or fail to improve, for Medicare AWV.     Subjective   89F who presents for follow up and AWV. She was recently seen in the ER after a fall. She had a full evaluation there and did not hurt herself. She has been having some balance issues and now using a Rolator. She is still highly independent. She is of clear mind and able to make clear and well thought out decisions for her life. She denies any worrisome findings today. No bruises or injuries.     She  has a past medical history of Anemia, Dry eye, Headache,

## 2025-08-15 DIAGNOSIS — I10 PRIMARY HYPERTENSION: ICD-10-CM

## 2025-08-18 RX ORDER — AMLODIPINE BESYLATE 10 MG/1
10 TABLET ORAL DAILY
Qty: 90 TABLET | Refills: 0 | Status: SHIPPED | OUTPATIENT
Start: 2025-08-18